# Patient Record
Sex: FEMALE | Race: WHITE | Employment: PART TIME | URBAN - METROPOLITAN AREA
[De-identification: names, ages, dates, MRNs, and addresses within clinical notes are randomized per-mention and may not be internally consistent; named-entity substitution may affect disease eponyms.]

---

## 2017-12-29 PROCEDURE — 88175 CYTOPATH C/V AUTO FLUID REDO: CPT | Performed by: OBSTETRICS & GYNECOLOGY

## 2018-02-28 PROCEDURE — 88305 TISSUE EXAM BY PATHOLOGIST: CPT | Performed by: INTERNAL MEDICINE

## 2018-10-03 PROBLEM — M25.519 ACUTE SHOULDER PAIN: Status: ACTIVE | Noted: 2018-10-03

## 2018-10-03 PROBLEM — R29.898 DECREASED STRENGTH OF UPPER EXTREMITY: Status: ACTIVE | Noted: 2018-10-03

## 2018-11-08 ENCOUNTER — HOSPITAL ENCOUNTER (OUTPATIENT)
Dept: MRI IMAGING | Age: 68
Discharge: HOME | End: 2018-11-08
Attending: NURSE PRACTITIONER
Payer: MEDICARE

## 2018-11-08 DIAGNOSIS — M25.511 RIGHT SHOULDER PAIN, UNSPECIFIED CHRONICITY: ICD-10-CM

## 2018-11-08 PROCEDURE — 73221 MRI JOINT UPR EXTREM W/O DYE: CPT | Performed by: NURSE PRACTITIONER

## 2019-01-02 PROBLEM — Z47.89 ORTHOPEDIC AFTERCARE: Status: ACTIVE | Noted: 2019-01-02

## 2019-05-29 RX ORDER — MULTIVIT,IRON,MINERALS/LUTEIN
1 TABLET ORAL DAILY
COMMUNITY
End: 2020-12-31

## 2019-05-29 RX ORDER — ACETAMINOPHEN 500 MG
1000 TABLET ORAL ONCE
Status: CANCELLED | OUTPATIENT
Start: 2019-05-29 | End: 2019-05-29

## 2019-06-06 ENCOUNTER — ANESTHESIA (OUTPATIENT)
Dept: SURGERY | Facility: HOSPITAL | Age: 69
End: 2019-06-06

## 2019-06-06 ENCOUNTER — HOSPITAL ENCOUNTER (OUTPATIENT)
Facility: HOSPITAL | Age: 69
Setting detail: HOSPITAL OUTPATIENT SURGERY
Discharge: HOME | End: 2019-06-06
Attending: ORTHOPAEDIC SURGERY | Admitting: ORTHOPAEDIC SURGERY
Payer: MEDICARE

## 2019-06-06 ENCOUNTER — ANESTHESIA EVENT (OUTPATIENT)
Dept: SURGERY | Facility: HOSPITAL | Age: 69
End: 2019-06-06

## 2019-06-06 VITALS
DIASTOLIC BLOOD PRESSURE: 75 MMHG | WEIGHT: 125.25 LBS | HEART RATE: 52 BPM | HEIGHT: 65 IN | TEMPERATURE: 98 F | SYSTOLIC BLOOD PRESSURE: 138 MMHG | BODY MASS INDEX: 20.87 KG/M2 | RESPIRATION RATE: 16 BRPM | OXYGEN SATURATION: 100 %

## 2019-06-06 DIAGNOSIS — M75.01 ADHESIVE CAPSULITIS OF RIGHT SHOULDER: ICD-10-CM

## 2019-06-06 PROCEDURE — 0RNJ4ZZ RELEASE RIGHT SHOULDER JOINT, PERCUTANEOUS ENDOSCOPIC APPROACH: ICD-10-PCS | Performed by: ORTHOPAEDIC SURGERY

## 2019-06-06 PROCEDURE — 76942 ECHO GUIDE FOR BIOPSY: CPT | Performed by: ORTHOPAEDIC SURGERY

## 2019-06-06 PROCEDURE — 3E0T3BZ INTRODUCTION OF ANESTHETIC AGENT INTO PERIPHERAL NERVES AND PLEXI, PERCUTANEOUS APPROACH: ICD-10-PCS | Performed by: ANESTHESIOLOGY

## 2019-06-06 RX ORDER — METOCLOPRAMIDE HYDROCHLORIDE 5 MG/ML
10 INJECTION INTRAMUSCULAR; INTRAVENOUS AS NEEDED
Status: DISCONTINUED | OUTPATIENT
Start: 2019-06-06 | End: 2019-06-06

## 2019-06-06 RX ORDER — HYDROCODONE BITARTRATE AND ACETAMINOPHEN 5; 325 MG/1; MG/1
2 TABLET ORAL AS NEEDED
Status: DISCONTINUED | OUTPATIENT
Start: 2019-06-06 | End: 2019-06-06

## 2019-06-06 RX ORDER — DIPHENHYDRAMINE HYDROCHLORIDE 50 MG/ML
12.5 INJECTION INTRAMUSCULAR; INTRAVENOUS AS NEEDED
Status: DISCONTINUED | OUTPATIENT
Start: 2019-06-06 | End: 2019-06-06

## 2019-06-06 RX ORDER — LABETALOL HYDROCHLORIDE 5 MG/ML
5 INJECTION, SOLUTION INTRAVENOUS EVERY 5 MIN PRN
Status: DISCONTINUED | OUTPATIENT
Start: 2019-06-06 | End: 2019-06-06

## 2019-06-06 RX ORDER — NAPROXEN 500 MG/1
500 TABLET ORAL 2 TIMES DAILY WITH MEALS
Qty: 28 TABLET | Refills: 0 | Status: SHIPPED | OUTPATIENT
Start: 2019-06-06 | End: 2019-06-20

## 2019-06-06 RX ORDER — NALOXONE HYDROCHLORIDE 0.4 MG/ML
80 INJECTION, SOLUTION INTRAMUSCULAR; INTRAVENOUS; SUBCUTANEOUS AS NEEDED
Status: DISCONTINUED | OUTPATIENT
Start: 2019-06-06 | End: 2019-06-06

## 2019-06-06 RX ORDER — SODIUM CHLORIDE, SODIUM LACTATE, POTASSIUM CHLORIDE, CALCIUM CHLORIDE 600; 310; 30; 20 MG/100ML; MG/100ML; MG/100ML; MG/100ML
INJECTION, SOLUTION INTRAVENOUS CONTINUOUS
Status: DISCONTINUED | OUTPATIENT
Start: 2019-06-06 | End: 2019-06-06

## 2019-06-06 RX ORDER — HYDROCODONE BITARTRATE AND ACETAMINOPHEN 5; 325 MG/1; MG/1
1 TABLET ORAL AS NEEDED
Status: DISCONTINUED | OUTPATIENT
Start: 2019-06-06 | End: 2019-06-06

## 2019-06-06 RX ORDER — ACETAMINOPHEN 500 MG
1000 TABLET ORAL EVERY 6 HOURS PRN
COMMUNITY
End: 2020-12-31 | Stop reason: ALTCHOICE

## 2019-06-06 RX ORDER — HYDROMORPHONE HYDROCHLORIDE 1 MG/ML
0.4 INJECTION, SOLUTION INTRAMUSCULAR; INTRAVENOUS; SUBCUTANEOUS EVERY 5 MIN PRN
Status: DISCONTINUED | OUTPATIENT
Start: 2019-06-06 | End: 2019-06-06

## 2019-06-06 RX ORDER — ONDANSETRON 2 MG/ML
4 INJECTION INTRAMUSCULAR; INTRAVENOUS AS NEEDED
Status: DISCONTINUED | OUTPATIENT
Start: 2019-06-06 | End: 2019-06-06

## 2019-06-06 RX ORDER — HYDROCODONE BITARTRATE AND ACETAMINOPHEN 10; 325 MG/1; MG/1
1 TABLET ORAL EVERY 4 HOURS PRN
Qty: 40 TABLET | Refills: 0 | Status: SHIPPED | OUTPATIENT
Start: 2019-06-06 | End: 2019-06-13 | Stop reason: ALTCHOICE

## 2019-06-06 NOTE — H&P
HPI (5/28/19):  Tommy Lima presents for follow-up for her right shoulder. Underwent a right shoulder coracohumeral and capsular injection on 4/26/2019.   This did help with some of her stiffness and she has made some improvement with range o damage to nerves and blood vessels, recurrent stiffness, persistent pain, need for further surgery, and problems with anesthesia. Overall she has fairly good rotator cuff strength, and I do not anticipate a recurrent rotator cuff tear.  We also discussed r

## 2019-06-06 NOTE — ANESTHESIA POSTPROCEDURE EVALUATION
Rajiv 64 Patient Status:  Hospital Outpatient Surgery   Age/Gender 81year old female MRN [id number]   Location [address] [place] [address] Attending Bernarda Gastelum MD   Hosp Day # 0 PCP MD Tonja Viramontes

## 2019-06-06 NOTE — BRIEF OP NOTE
Pre-Operative Diagnosis: Adhesive capsulitis of right shoulder [M75.01]     Post-Operative Diagnosis: Adhesive capsulitis of right shoulder [M75.01]      Procedure Performed:   Procedure(s):  RIGHT SHOULDER ARTHROSCOPY, CAPSULAR RELEASE, MANIPULATION UNDER

## 2019-06-06 NOTE — ANESTHESIA PREPROCEDURE EVALUATION
PRE-OP EVALUATION    Patient Name: Sharron Elizondo    Pre-op Diagnosis: Adhesive capsulitis of right shoulder [M75.01]    Procedure(s):  RIGHT SHOULDER ARTHROSCOPY, CAPSULAR RELEASE, MANIPULATION UNDER ANESTHESIA    Surgeon(s) and Role:     [place] Fung COLONOSCOPY,DIAGNOSTIC  2008    Dr. Frank Hand   • COLONOSCOPY,DIAGNOSTIC  02/28/2018    hepatic flexure polyp   • ESOPHAGOGASTRODUODENOSCOPY, COLONOSCOPY, POSSIBLE BIOPSY, POSSIBLE POLYPECTOMY 90419, 44042 N/A 2/28/2018    Performed by Lacho Cobb MD

## 2019-06-06 NOTE — OR NURSING
INSTRUCTIONS GIVEN AND QUESTIONS ADDRESSED. REQUESTING TO REST BEFORE GOING HOME. SCRIPTS TO BE DELIVERED. SLING IN PLACE. OPERATIVE ARM STILL NUMB.

## 2019-06-07 NOTE — OPERATIVE REPORT
[address]    PATIENT'S NAME: Sherryle Olp   ATTENDING PHYSICIAN: May Guy MD   OPERATING PHYSICIAN: May Guy MD   PATIENT ACCOUNT#:   [id number]    LOCATION:  [address] [place] [address] 1 ED 10  MEDICAL RECORD #:   [id number]       [facility] preoperative antibiotics.   An exam under anesthesia was performed in the supine position, and the range of motion for her nonoperative left arm shoulder was 180 degrees of forward flexion, 90 degrees of external rotation with the arm abducted, 80 degrees o probe in the posterior portal and completed our capsular release from the 6-o'clock position to the posterior portal itself, thus completing a full capsular release from anterior, inferior, and posterior.   We did obtain hemostasis in the joint and then rem

## 2022-01-31 PROBLEM — T85.44XA CAPSULAR CONTRACTURE OF BREAST IMPLANT: Status: ACTIVE | Noted: 2022-01-31

## 2022-01-31 PROBLEM — G57.62 PLANTAR NEUROMA OF LEFT FOOT: Status: ACTIVE | Noted: 2022-01-31

## 2022-01-31 PROBLEM — M25.519 ACUTE SHOULDER PAIN: Status: RESOLVED | Noted: 2018-10-03 | Resolved: 2022-01-31

## 2022-01-31 PROBLEM — E46 PROTEIN-CALORIE MALNUTRITION, UNSPECIFIED SEVERITY (HCC): Status: RESOLVED | Noted: 2022-01-31 | Resolved: 2022-01-31

## 2022-01-31 PROBLEM — E46 PROTEIN-CALORIE MALNUTRITION, UNSPECIFIED SEVERITY (HCC): Status: ACTIVE | Noted: 2022-01-31

## 2022-01-31 PROBLEM — G57.60 MORTON'S METATARSALGIA: Status: ACTIVE | Noted: 2022-01-31

## 2022-01-31 PROBLEM — Z47.89 ORTHOPEDIC AFTERCARE: Status: RESOLVED | Noted: 2019-01-02 | Resolved: 2022-01-31

## 2022-01-31 PROBLEM — H91.90 HEARING LOSS: Status: ACTIVE | Noted: 2022-01-31

## 2022-01-31 PROBLEM — R00.1 BRADYCARDIA: Status: ACTIVE | Noted: 2022-01-31

## 2023-01-04 ENCOUNTER — HOSPITAL ENCOUNTER (OUTPATIENT)
Age: 73
Discharge: HOME | End: 2023-01-04
Attending: EMERGENCY MEDICINE
Payer: MEDICARE

## 2023-01-04 VITALS
SYSTOLIC BLOOD PRESSURE: 142 MMHG | WEIGHT: 118 LBS | BODY MASS INDEX: 19.66 KG/M2 | HEART RATE: 66 BPM | RESPIRATION RATE: 18 BRPM | DIASTOLIC BLOOD PRESSURE: 72 MMHG | OXYGEN SATURATION: 98 % | TEMPERATURE: 100 F | HEIGHT: 65 IN

## 2023-01-04 DIAGNOSIS — U07.1 COVID-19: Primary | ICD-10-CM

## 2023-01-04 LAB
POCT INFLUENZA A: NEGATIVE
POCT INFLUENZA B: NEGATIVE
SARS-COV-2 RNA RESP QL NAA+PROBE: DETECTED

## 2023-01-04 PROCEDURE — 99204 OFFICE O/P NEW MOD 45 MIN: CPT

## 2023-01-04 PROCEDURE — 99203 OFFICE O/P NEW LOW 30 MIN: CPT

## 2023-01-04 PROCEDURE — 87502 INFLUENZA DNA AMP PROBE: CPT | Performed by: EMERGENCY MEDICINE

## 2023-01-04 NOTE — ED INITIAL ASSESSMENT (HPI)
Pt here w/ c/o fatigue, body aches, HA, fever, dry cough. Home covid negative. Some nasal congestion.

## 2023-01-04 NOTE — DISCHARGE INSTRUCTIONS
Return for new or worsening symptoms such as difficulty breathing. The first day of illness is day 0. You need to quarantine for 5 days after that. You need to wear a mask for the 5 days following that.

## 2023-03-05 ENCOUNTER — APPOINTMENT (OUTPATIENT)
Dept: GENERAL RADIOLOGY | Age: 73
End: 2023-03-05
Attending: PHYSICIAN ASSISTANT
Payer: MEDICARE

## 2023-03-05 ENCOUNTER — HOSPITAL ENCOUNTER (OUTPATIENT)
Age: 73
Discharge: HOME | End: 2023-03-05
Payer: MEDICARE

## 2023-03-05 VITALS
DIASTOLIC BLOOD PRESSURE: 69 MMHG | WEIGHT: 118 LBS | SYSTOLIC BLOOD PRESSURE: 160 MMHG | OXYGEN SATURATION: 100 % | HEIGHT: 65 IN | BODY MASS INDEX: 19.66 KG/M2 | RESPIRATION RATE: 18 BRPM | HEART RATE: 57 BPM | TEMPERATURE: 98 F

## 2023-03-05 DIAGNOSIS — J02.9 ACUTE VIRAL PHARYNGITIS: ICD-10-CM

## 2023-03-05 DIAGNOSIS — J20.9 ACUTE BRONCHITIS, UNSPECIFIED ORGANISM: Primary | ICD-10-CM

## 2023-03-05 LAB — S PYO AG THROAT QL IA.RAPID: NEGATIVE

## 2023-03-05 PROCEDURE — 99214 OFFICE O/P EST MOD 30 MIN: CPT

## 2023-03-05 PROCEDURE — 87651 STREP A DNA AMP PROBE: CPT | Performed by: PHYSICIAN ASSISTANT

## 2023-03-05 PROCEDURE — 71046 X-RAY EXAM CHEST 2 VIEWS: CPT | Performed by: PHYSICIAN ASSISTANT

## 2023-03-05 RX ORDER — PREDNISONE 20 MG/1
40 TABLET ORAL DAILY
Qty: 10 TABLET | Refills: 0 | Status: SHIPPED | OUTPATIENT
Start: 2023-03-05 | End: 2023-03-10

## 2023-03-05 RX ORDER — BENZONATATE 100 MG/1
100 CAPSULE ORAL 3 TIMES DAILY PRN
Qty: 30 CAPSULE | Refills: 0 | Status: SHIPPED | OUTPATIENT
Start: 2023-03-05 | End: 2023-04-04

## 2023-03-05 NOTE — ED INITIAL ASSESSMENT (HPI)
Congested cough, congestion, runny nose, sore throat x1 week. Tried taking sudafed without improvement. Denies known fevers.

## 2023-03-05 NOTE — DISCHARGE INSTRUCTIONS
Warm salt water gargles, tylenol and ibuprofen as needed, if worsening symptoms or difficulty swallowing be re-evaluated    Please follow up with your PCP if no improvement within 5-7 days. Go directly to the ER for any acute worsening of symptoms. Push oral fluids to help loosen up chest mucous and move it out of your body. Use a cold mist humidifier. Get enough rest and sleep. Take Guaifenesin (Robitussin), an expectorant to loosen mucous. To suppress a dry, hacking cough, you may take Dextromethorphan (Robitussin DM)  Seek immediate medical attention if symptoms worsen, if fever higher than 102, if no improvement in a few days. Symptoms usually last 10 days.

## 2023-09-24 ENCOUNTER — HOSPITAL ENCOUNTER (OUTPATIENT)
Age: 73
Discharge: HOME | End: 2023-09-24
Attending: EMERGENCY MEDICINE
Payer: MEDICARE

## 2023-09-24 VITALS
HEART RATE: 56 BPM | WEIGHT: 118 LBS | OXYGEN SATURATION: 98 % | BODY MASS INDEX: 20.14 KG/M2 | TEMPERATURE: 99 F | SYSTOLIC BLOOD PRESSURE: 135 MMHG | RESPIRATION RATE: 18 BRPM | HEIGHT: 64 IN | DIASTOLIC BLOOD PRESSURE: 69 MMHG

## 2023-09-24 DIAGNOSIS — J06.9 VIRAL UPPER RESPIRATORY TRACT INFECTION: Primary | ICD-10-CM

## 2023-09-24 LAB — SARS-COV-2 RNA RESP QL NAA+PROBE: NOT DETECTED

## 2023-09-24 PROCEDURE — 99212 OFFICE O/P EST SF 10 MIN: CPT

## 2023-09-24 PROCEDURE — 99213 OFFICE O/P EST LOW 20 MIN: CPT

## 2023-09-24 NOTE — ED INITIAL ASSESSMENT (HPI)
Pt here c/o cough for last 3 days. Denies congestion. Denies sore throat. Denies sob. Denies chest pain.

## 2023-09-24 NOTE — DISCHARGE INSTRUCTIONS
Tylenol or Advil as needed follow-up your doctor of next 2 days return if worse over-the-counter cough or cold medications.

## 2024-04-19 ENCOUNTER — APPOINTMENT (OUTPATIENT)
Dept: CT IMAGING | Facility: HOSPITAL | Age: 74
End: 2024-04-19
Attending: EMERGENCY MEDICINE
Payer: MEDICARE

## 2024-04-19 ENCOUNTER — HOSPITAL ENCOUNTER (INPATIENT)
Facility: HOSPITAL | Age: 74
LOS: 5 days | Discharge: HOME HEALTH | End: 2024-04-24
Attending: EMERGENCY MEDICINE | Admitting: INTERNAL MEDICINE
Payer: MEDICARE

## 2024-04-19 DIAGNOSIS — K35.211 ACUTE APPENDICITIS WITH PERFORATION, GENERALIZED PERITONITIS, AND ABSCESS, UNSPECIFIED WHETHER GANGRENE PRESENT: Primary | ICD-10-CM

## 2024-04-19 DIAGNOSIS — R19.7 DIARRHEA OF PRESUMED INFECTIOUS ORIGIN: ICD-10-CM

## 2024-04-19 DIAGNOSIS — E86.0 DEHYDRATION: ICD-10-CM

## 2024-04-19 DIAGNOSIS — E87.6 HYPOKALEMIA: ICD-10-CM

## 2024-04-19 DIAGNOSIS — E87.1 HYPONATREMIA: ICD-10-CM

## 2024-04-19 LAB
ALBUMIN SERPL-MCNC: 2.4 G/DL (ref 3.4–5)
ALBUMIN/GLOB SERPL: 0.6 {RATIO} (ref 1–2)
ALP LIVER SERPL-CCNC: 103 U/L
ALT SERPL-CCNC: 26 U/L
ANION GAP SERPL CALC-SCNC: 10 MMOL/L (ref 0–18)
ANION GAP SERPL CALC-SCNC: 8 MMOL/L (ref 0–18)
AST SERPL-CCNC: 27 U/L (ref 15–37)
ATRIAL RATE: 65 BPM
BASOPHILS # BLD AUTO: 0 X10(3) UL (ref 0–0.2)
BASOPHILS # BLD: 0 X10(3) UL (ref 0–0.2)
BASOPHILS NFR BLD AUTO: 0 %
BASOPHILS NFR BLD: 0 %
BILIRUB SERPL-MCNC: 0.5 MG/DL (ref 0.1–2)
BILIRUB UR QL STRIP.AUTO: NEGATIVE
BUN BLD-MCNC: 12 MG/DL (ref 9–23)
BUN BLD-MCNC: 13 MG/DL (ref 9–23)
CALCIUM BLD-MCNC: 8 MG/DL (ref 8.5–10.1)
CALCIUM BLD-MCNC: 8.5 MG/DL (ref 8.5–10.1)
CHLORIDE SERPL-SCNC: 89 MMOL/L (ref 98–112)
CHLORIDE SERPL-SCNC: 99 MMOL/L (ref 98–112)
CO2 SERPL-SCNC: 25 MMOL/L (ref 21–32)
CO2 SERPL-SCNC: 30 MMOL/L (ref 21–32)
COLOR UR AUTO: YELLOW
CREAT BLD-MCNC: 0.72 MG/DL
CREAT BLD-MCNC: 0.74 MG/DL
EGFRCR SERPLBLD CKD-EPI 2021: 86 ML/MIN/1.73M2 (ref 60–?)
EGFRCR SERPLBLD CKD-EPI 2021: 89 ML/MIN/1.73M2 (ref 60–?)
EOSINOPHIL # BLD AUTO: 0.01 X10(3) UL (ref 0–0.7)
EOSINOPHIL # BLD: 0 X10(3) UL (ref 0–0.7)
EOSINOPHIL NFR BLD AUTO: 0.1 %
EOSINOPHIL NFR BLD: 0 %
ERYTHROCYTE [DISTWIDTH] IN BLOOD BY AUTOMATED COUNT: 12.4 %
ERYTHROCYTE [DISTWIDTH] IN BLOOD BY AUTOMATED COUNT: 12.6 %
GLOBULIN PLAS-MCNC: 4.1 G/DL (ref 2.8–4.4)
GLUCOSE BLD-MCNC: 116 MG/DL (ref 70–99)
GLUCOSE BLD-MCNC: 92 MG/DL (ref 70–99)
GLUCOSE UR STRIP.AUTO-MCNC: NORMAL MG/DL
HCT VFR BLD AUTO: 37 %
HCT VFR BLD AUTO: 39.9 %
HGB BLD-MCNC: 13 G/DL
HGB BLD-MCNC: 13.9 G/DL
IMM GRANULOCYTES # BLD AUTO: 0.23 X10(3) UL (ref 0–1)
IMM GRANULOCYTES NFR BLD: 1.4 %
INR BLD: 1.22 (ref 0.8–1.2)
KETONES UR STRIP.AUTO-MCNC: 40 MG/DL
LEUKOCYTE ESTERASE UR QL STRIP.AUTO: 25
LIPASE SERPL-CCNC: 17 U/L (ref 13–75)
LYMPHOCYTES # BLD AUTO: 1.18 X10(3) UL (ref 1–4)
LYMPHOCYTES NFR BLD AUTO: 6.9 %
LYMPHOCYTES NFR BLD: 1.21 X10(3) UL (ref 1–4)
LYMPHOCYTES NFR BLD: 7 %
MCH RBC QN AUTO: 29.4 PG (ref 26–34)
MCH RBC QN AUTO: 29.7 PG (ref 26–34)
MCHC RBC AUTO-ENTMCNC: 34.8 G/DL (ref 31–37)
MCHC RBC AUTO-ENTMCNC: 35.1 G/DL (ref 31–37)
MCV RBC AUTO: 84.5 FL
MCV RBC AUTO: 84.7 FL
MONOCYTES # BLD AUTO: 1.43 X10(3) UL (ref 0.1–1)
MONOCYTES # BLD: 1.38 X10(3) UL (ref 0.1–1)
MONOCYTES NFR BLD AUTO: 8.4 %
MONOCYTES NFR BLD: 8 %
MORPHOLOGY: NORMAL
NEUTROPHILS # BLD AUTO: 14.16 X10 (3) UL (ref 1.5–7.7)
NEUTROPHILS # BLD AUTO: 14.16 X10(3) UL (ref 1.5–7.7)
NEUTROPHILS # BLD AUTO: 14.68 X10 (3) UL (ref 1.5–7.7)
NEUTROPHILS NFR BLD AUTO: 83.2 %
NEUTROPHILS NFR BLD: 71 %
NEUTS BAND NFR BLD: 14 %
NEUTS HYPERSEG # BLD: 14.71 X10(3) UL (ref 1.5–7.7)
NITRITE UR QL STRIP.AUTO: NEGATIVE
OSMOLALITY SERPL CALC.SUM OF ELEC: 269 MOSM/KG (ref 275–295)
OSMOLALITY SERPL CALC.SUM OF ELEC: 273 MOSM/KG (ref 275–295)
P AXIS: 67 DEGREES
P-R INTERVAL: 132 MS
PH UR STRIP.AUTO: 6 [PH] (ref 5–8)
PLATELET # BLD AUTO: 290 10(3)UL (ref 150–450)
PLATELET # BLD AUTO: 355 10(3)UL (ref 150–450)
PLATELET MORPHOLOGY: NORMAL
POTASSIUM SERPL-SCNC: 2.7 MMOL/L (ref 3.5–5.1)
POTASSIUM SERPL-SCNC: 3.1 MMOL/L (ref 3.5–5.1)
PROT SERPL-MCNC: 6.5 G/DL (ref 6.4–8.2)
PROT UR STRIP.AUTO-MCNC: 100 MG/DL
PROTHROMBIN TIME: 15.4 SECONDS (ref 11.6–14.8)
Q-T INTERVAL: 418 MS
QRS DURATION: 88 MS
QTC CALCULATION (BEZET): 434 MS
R AXIS: 64 DEGREES
RBC # BLD AUTO: 4.37 X10(6)UL
RBC # BLD AUTO: 4.72 X10(6)UL
RBC #/AREA URNS AUTO: >10 /HPF
SODIUM SERPL-SCNC: 129 MMOL/L (ref 136–145)
SODIUM SERPL-SCNC: 132 MMOL/L (ref 136–145)
SP GR UR STRIP.AUTO: 1.02 (ref 1–1.03)
T AXIS: 76 DEGREES
TOTAL CELLS COUNTED BLD: 100
TROPONIN I SERPL HS-MCNC: 4 NG/L
UROBILINOGEN UR STRIP.AUTO-MCNC: NORMAL MG/DL
VENTRICULAR RATE: 65 BPM
WBC # BLD AUTO: 17 X10(3) UL (ref 4–11)
WBC # BLD AUTO: 17.3 X10(3) UL (ref 4–11)

## 2024-04-19 PROCEDURE — 85007 BL SMEAR W/DIFF WBC COUNT: CPT | Performed by: EMERGENCY MEDICINE

## 2024-04-19 PROCEDURE — 85025 COMPLETE CBC W/AUTO DIFF WBC: CPT

## 2024-04-19 PROCEDURE — 93005 ELECTROCARDIOGRAM TRACING: CPT

## 2024-04-19 PROCEDURE — 80053 COMPREHEN METABOLIC PANEL: CPT | Performed by: EMERGENCY MEDICINE

## 2024-04-19 PROCEDURE — 99285 EMERGENCY DEPT VISIT HI MDM: CPT

## 2024-04-19 PROCEDURE — 74177 CT ABD & PELVIS W/CONTRAST: CPT | Performed by: EMERGENCY MEDICINE

## 2024-04-19 PROCEDURE — 85027 COMPLETE CBC AUTOMATED: CPT | Performed by: EMERGENCY MEDICINE

## 2024-04-19 PROCEDURE — 85027 COMPLETE CBC AUTOMATED: CPT

## 2024-04-19 PROCEDURE — 93010 ELECTROCARDIOGRAM REPORT: CPT

## 2024-04-19 PROCEDURE — 96368 THER/DIAG CONCURRENT INF: CPT

## 2024-04-19 PROCEDURE — 85610 PROTHROMBIN TIME: CPT | Performed by: SURGERY

## 2024-04-19 PROCEDURE — 96366 THER/PROPH/DIAG IV INF ADDON: CPT

## 2024-04-19 PROCEDURE — 80053 COMPREHEN METABOLIC PANEL: CPT

## 2024-04-19 PROCEDURE — 96375 TX/PRO/DX INJ NEW DRUG ADDON: CPT

## 2024-04-19 PROCEDURE — 83690 ASSAY OF LIPASE: CPT | Performed by: EMERGENCY MEDICINE

## 2024-04-19 PROCEDURE — 81001 URINALYSIS AUTO W/SCOPE: CPT | Performed by: EMERGENCY MEDICINE

## 2024-04-19 PROCEDURE — 85025 COMPLETE CBC W/AUTO DIFF WBC: CPT | Performed by: INTERNAL MEDICINE

## 2024-04-19 PROCEDURE — 84484 ASSAY OF TROPONIN QUANT: CPT | Performed by: EMERGENCY MEDICINE

## 2024-04-19 PROCEDURE — 83690 ASSAY OF LIPASE: CPT

## 2024-04-19 PROCEDURE — 85007 BL SMEAR W/DIFF WBC COUNT: CPT

## 2024-04-19 PROCEDURE — 96361 HYDRATE IV INFUSION ADD-ON: CPT

## 2024-04-19 PROCEDURE — 96365 THER/PROPH/DIAG IV INF INIT: CPT

## 2024-04-19 RX ORDER — KETOROLAC TROMETHAMINE 15 MG/ML
15 INJECTION, SOLUTION INTRAMUSCULAR; INTRAVENOUS ONCE
Status: COMPLETED | OUTPATIENT
Start: 2024-04-19 | End: 2024-04-19

## 2024-04-19 RX ORDER — POTASSIUM CHLORIDE 20 MEQ/1
20 TABLET, EXTENDED RELEASE ORAL ONCE
Status: COMPLETED | OUTPATIENT
Start: 2024-04-19 | End: 2024-04-19

## 2024-04-19 RX ORDER — ONDANSETRON 2 MG/ML
4 INJECTION INTRAMUSCULAR; INTRAVENOUS EVERY 6 HOURS PRN
Status: DISCONTINUED | OUTPATIENT
Start: 2024-04-19 | End: 2024-04-24

## 2024-04-19 RX ORDER — METOCLOPRAMIDE HYDROCHLORIDE 5 MG/ML
10 INJECTION INTRAMUSCULAR; INTRAVENOUS EVERY 8 HOURS PRN
Status: DISCONTINUED | OUTPATIENT
Start: 2024-04-19 | End: 2024-04-24

## 2024-04-19 RX ORDER — ONDANSETRON 2 MG/ML
4 INJECTION INTRAMUSCULAR; INTRAVENOUS EVERY 4 HOURS PRN
Status: CANCELLED | OUTPATIENT
Start: 2024-04-19 | End: 2024-04-19

## 2024-04-19 RX ORDER — ONDANSETRON 2 MG/ML
4 INJECTION INTRAMUSCULAR; INTRAVENOUS
Status: DISCONTINUED | OUTPATIENT
Start: 2024-04-19 | End: 2024-04-24

## 2024-04-19 RX ORDER — MORPHINE SULFATE 2 MG/ML
1 INJECTION, SOLUTION INTRAMUSCULAR; INTRAVENOUS EVERY 2 HOUR PRN
Status: DISCONTINUED | OUTPATIENT
Start: 2024-04-19 | End: 2024-04-24

## 2024-04-19 RX ORDER — MORPHINE SULFATE 4 MG/ML
4 INJECTION, SOLUTION INTRAMUSCULAR; INTRAVENOUS EVERY 2 HOUR PRN
Status: DISCONTINUED | OUTPATIENT
Start: 2024-04-19 | End: 2024-04-24

## 2024-04-19 RX ORDER — MELATONIN
3 NIGHTLY PRN
Status: DISCONTINUED | OUTPATIENT
Start: 2024-04-19 | End: 2024-04-24

## 2024-04-19 RX ORDER — ONDANSETRON 2 MG/ML
4 INJECTION INTRAMUSCULAR; INTRAVENOUS ONCE
Status: DISCONTINUED | OUTPATIENT
Start: 2024-04-19 | End: 2024-04-24

## 2024-04-19 RX ORDER — ENOXAPARIN SODIUM 100 MG/ML
40 INJECTION SUBCUTANEOUS DAILY
Status: DISCONTINUED | OUTPATIENT
Start: 2024-04-20 | End: 2024-04-24

## 2024-04-19 RX ORDER — BISACODYL 10 MG
10 SUPPOSITORY, RECTAL RECTAL
Status: DISCONTINUED | OUTPATIENT
Start: 2024-04-19 | End: 2024-04-24

## 2024-04-19 RX ORDER — ACETAMINOPHEN 500 MG
500 TABLET ORAL EVERY 4 HOURS PRN
Status: DISCONTINUED | OUTPATIENT
Start: 2024-04-19 | End: 2024-04-24

## 2024-04-19 RX ORDER — SODIUM CHLORIDE 9 MG/ML
INJECTION, SOLUTION INTRAVENOUS CONTINUOUS
Status: DISCONTINUED | OUTPATIENT
Start: 2024-04-19 | End: 2024-04-23

## 2024-04-19 RX ORDER — POTASSIUM CHLORIDE 14.9 MG/ML
20 INJECTION INTRAVENOUS ONCE
Status: COMPLETED | OUTPATIENT
Start: 2024-04-19 | End: 2024-04-19

## 2024-04-19 RX ORDER — POLYETHYLENE GLYCOL 3350 17 G/17G
17 POWDER, FOR SOLUTION ORAL DAILY PRN
Status: DISCONTINUED | OUTPATIENT
Start: 2024-04-19 | End: 2024-04-24

## 2024-04-19 RX ORDER — SODIUM CHLORIDE 9 MG/ML
INJECTION, SOLUTION INTRAVENOUS CONTINUOUS
Status: DISCONTINUED | OUTPATIENT
Start: 2024-04-19 | End: 2024-04-24

## 2024-04-19 RX ORDER — MORPHINE SULFATE 2 MG/ML
2 INJECTION, SOLUTION INTRAMUSCULAR; INTRAVENOUS ONCE
Status: COMPLETED | OUTPATIENT
Start: 2024-04-19 | End: 2024-04-19

## 2024-04-19 RX ORDER — MORPHINE SULFATE 2 MG/ML
2 INJECTION, SOLUTION INTRAMUSCULAR; INTRAVENOUS EVERY 2 HOUR PRN
Status: DISCONTINUED | OUTPATIENT
Start: 2024-04-19 | End: 2024-04-24

## 2024-04-19 RX ORDER — ENEMA 19; 7 G/133ML; G/133ML
1 ENEMA RECTAL ONCE AS NEEDED
Status: DISCONTINUED | OUTPATIENT
Start: 2024-04-19 | End: 2024-04-24

## 2024-04-19 RX ORDER — SENNOSIDES 8.6 MG
17.2 TABLET ORAL NIGHTLY PRN
Status: DISCONTINUED | OUTPATIENT
Start: 2024-04-19 | End: 2024-04-24

## 2024-04-19 NOTE — ED QUICK NOTES
Orders for admission, patient is aware of plan and ready to go upstairs. Any questions, please call ED RN Marcio at extension [phone].     Patient Covid vaccination status: Fully vaccinated     COVID Test Ordered in ED: None    COVID Suspicion at Admission: N/A    Running Infusions:    sodium chloride 125 mL/hr at 04/19/24 1501        Mental Status/LOC at time of transport: A&Ox4    Other pertinent information:   CIWA score: N/A   NIH score:  N/A

## 2024-04-19 NOTE — ED PROVIDER NOTES
Patient Seen in: [facility] Emergency Department      History     Chief Complaint   Patient presents with    Abdomen/Flank Pain     Stated Complaint: abd pain for 7 days    Subjective:   HPI    Patient reports symptoms starting about 7 days ago.  Patient reported initially some upper abdominal pain but now she has diffuse pain in all quadrants.  She was nauseous and had vomiting the first few days.  No coffee-ground or bloody emesis reported.  Patient went on to have diarrhea.  Daughter reports patient has had at least 12-15 episodes of diarrhea a day.  No black or bloody stool reported.  No fever.  No recent travel, unusual foods, or others ill with similar symptoms.  No antibiotics in the last month.  Patient gestures over her entire abdomen as location of the discomfort.  Patient was feeling short of breath today and generally weak and dehydrated.  No chest pain.    Objective:   Past Medical History:    COVID    Mitral valve prolapse    Strep throat    Visual impairment    reading glasses              Past Surgical History:   Procedure Laterality Date    Arthroscopy of joint unlisted Right 2018          Colonoscopy,diagnostic      Dr. Rayray Vernon,diagnostic  2018    hepatic flexure polyp    Egd N/A 2018    Procedure: ESOPHAGOGASTRODUODENOSCOPY, COLONOSCOPY, POSSIBLE BIOPSY, POSSIBLE POLYPECTOMY 67099, 89835;  Surgeon: Yan Quiñones MD;  Location: [facility]    Enlarge breast with implant Bilateral 2001    Implant left      Implant right      Other surgical history      abdominoplasty    Rev lower eyelid exten fat pad  10/14/08    Performed by LISA FERRARA at [facility]    Rev lower eyelid exten fat pad  10/14/08    Performed by LISA FERRARA at [facility]    Rev upper eyelid w excess skin  10/14/08    Performed by LISA FERRARA at [facility]    Rev upper eyelid w excess skin  10/14/08     Performed by LISA FERRARA at [facility], [facility]    Shoulder arthroscopy Right 06/2019    manipulation    Upper gi endoscopy,diagnosis  02/28/2018    hiatal hernia, gastric and SB Bx taken                Social History     Socioeconomic History    Marital status:    Tobacco Use    Smoking status: Never    Smokeless tobacco: Never   Vaping Use    Vaping status: Never Used   Substance and Sexual Activity    Alcohol use: Yes     Comment: socially    Drug use: No    Sexual activity: Not Currently     Partners: Male     Social Determinants of Health     Financial Resource Strain: Low Risk  (6/21/2021)    Received from [facility]    Overall Financial Resource Strain (CARDIA)     Difficulty of Paying Living Expenses: Not hard at all   Food Insecurity: No Food Insecurity (6/21/2021)    Received from [facility]    Hunger Vital Sign     Worried About Running Out of Food in the Last Year: Never true     Ran Out of Food in the Last Year: Never true   Transportation Needs: No Transportation Needs (6/21/2021)    Received from [facility]    PRAPARE - Transportation     Lack of Transportation (Medical): No     Lack of Transportation (Non-Medical): No   Stress: No Stress Concern Present (6/21/2021)    Received from [facility]    [employer] [place] [employer] - Occupational Stress Questionnaire     Feeling of Stress : Not at all              Review of Systems    Positive for stated complaint: abd pain for 7 days  Other systems are as noted in HPI.  Constitutional and vital signs reviewed.      All other systems reviewed and negative except as noted above.    Physical Exam     ED Triage Vitals [04/19/24 1321]   /75   Pulse 77   Resp 16   Temp 97.6 °F (36.4 °C)   Temp src Temporal   SpO2 97 %   O2 Device None (Room air)       Current:BP (!) 150/94   Pulse 70   Temp 97.6 °F (36.4 °C) (Temporal)   Resp 18   LMP  (LMP  Unknown)   SpO2 98%         Physical Exam  General: The patient is awake, alert, conversant.   Eyes: sclera white, conjunctiva pink and moist.  Lids and lashes are normal.  Oromucosa lips are dry  Neck: With no JVD  Lungs: Clear to auscultation bilaterally.  No rhonchi or rales.  Heart: Normal S1 and S2, without murmur.  Distal pulses are strong and symmetric.  Abdomen: Soft, nondistended.  Markedly tender diffusely across the mid lower abdomen with some involuntary guarding.  Extremities: Unremarkable.  Calves nonswollen, symmetric, nontender.  No pedal edema.  Skin: Not particularly pale  Neurologic:  Mental status as above.  Patient moves all extremities but seems mildly generally weak         ED Course     Labs Reviewed   COMP METABOLIC PANEL (14) - Abnormal; Notable for the following components:       Result Value    Glucose 116 (*)     Sodium 129 (*)     Potassium 2.7 (*)     Chloride 89 (*)     Calculated Osmolality 269 (*)     Albumin 2.4 (*)     A/G Ratio 0.6 (*)     All other components within normal limits   URINALYSIS WITH CULTURE REFLEX - Abnormal; Notable for the following components:    Clarity Urine Turbid (*)     Ketones Urine 40 (*)     Blood Urine 1+ (*)     Protein Urine 100 (*)     Leukocyte Esterase Urine 25 (*)     WBC Urine 6-10 (*)     RBC Urine >10 (*)     Bacteria Urine Rare (*)     Squamous Epi. Cells Few (*)     All other components within normal limits   MANUAL DIFFERENTIAL - Abnormal; Notable for the following components:    Neutrophil Absolute Manual 14.71 (*)     Monocyte Absolute Manual 1.38 (*)     All other components within normal limits   CBC W/ DIFFERENTIAL - Abnormal; Notable for the following components:    WBC 17.3 (*)     Neutrophil Absolute Prelim 14.68 (*)     All other components within normal limits   LIPASE - Normal   TROPONIN I HIGH SENSITIVITY - Normal   CBC WITH DIFFERENTIAL WITH PLATELET    Narrative:     The following orders were created for panel order CBC With  Differential With Platelet.  Procedure                               Abnormality         Status                     ---------                               -----------         ------                     CBC W/ DIFFERENTIAL[[id number]]          Abnormal            Final result                 Please view results for these tests on the individual orders.   SCAN SLIDE   URINE CULTURE, ROUTINE     EKG    Rate, intervals and axes as noted on EKG Report.  Rate: 65 bpm  Rhythm: Sinus Rhythm  Reading: Sinus rhythm with no extraordinary changes suggest ischemia or infarct                          MDM      Patient with 7 days of profuse diarrhea.  She appears significantly dehydrated and I suspect electrolyte abnormalities such as hyponatremia or hypokalemia.  Her abdomen is quite tender.  Colitis or diverticulitis include the differential.  This could be an infectious colitis.  Stool panel ordered   patient treated with IV fluid and offered nausea medicine.    CBC shows significantly elevated white count with a predominance of neutrophils on differential.  Hemoglobin and platelets are normal  Metabolic panel shows hypokalemia and hyponatremia treated with IV fluid normal saline and oral/IV potassium supplement respectively.  LFTs and lipase are normal.  Troponin negative  Urinalysis with high specific gravity and ketones.  Only 6-10 WBCs in a patient with no UTI symptoms    CT abdomen pelvis  I personally reviewed the actual radiographs themselves and my individual interpretation shows intra-abdominal abscess  radiologist's formal interpretation which I have reviewed  CONCLUSION:    1. There is wall thickening of the appendix with fluid.  There is irregularity of the distal appendix suggestive of perforation.   2. Large pelvic fluid collections measuring 10.7 x 4.9 x 10.9 cm and 7.9 x 5.2 x 6.7 cm suggestive of abscesses.   3. There is wall thickening of small bowel loops within the pelvis as well as the rectosigmoid portion  of the colon which may be secondary to is secondary inflammation.       I discussed case with Dr. Flowers, hospitalist.  She will admit  I discussed case with Dr. Monsivais, general surgery.  He will evaluate patient make further recommendations    I updated patient and her daughter on the workup and treatment plan  Patient will be maintained n.p.o.  She was started on Zosyn antibiotic.  Admission disposition: 4/19/2024  4:05 PM                                        Medical Decision Making      Disposition and Plan     Clinical Impression:  1. Acute appendicitis with perforation, generalized peritonitis, and abscess, unspecified whether gangrene present    2. Diarrhea of presumed infectious origin    3. Dehydration    4. Hypokalemia    5. Hyponatremia         Disposition:  Admit  4/19/2024  4:05 pm    Follow-up:  No follow-up provider specified.        Medications Prescribed:  Current Discharge Medication List                            Hospital Problems       Present on Admission  Date Reviewed: 9/24/2023            ICD-10-CM Noted POA    * (Principal) Diarrhea of presumed infectious origin R19.7 4/19/2024 Unknown    Acute appendicitis with perforation, generalized peritonitis, and abscess, unspecified whether gangrene present K35.211 4/19/2024 Unknown

## 2024-04-19 NOTE — CONSULTS
Merissa Oshea is a 73 year old female  Chief Complaint   Patient presents with    Abdomen/Flank Pain       HPI: 72 y/o female seen in er with abdominal pain  Daughter present  Pain began about 1 week ago  Initially had gotten better, now worse  Previous abdominal plasty          Past Medical History:    COVID    Mitral valve prolapse    Strep throat    Visual impairment    reading glasses     Past Surgical History:   Procedure Laterality Date    Arthroscopy of joint unlisted Right 2018          Colonoscopy,diagnostic      Dr. Seo    Colonoscopy,diagnostic  2018    hepatic flexure polyp    Egd N/A 2018    Procedure: ESOPHAGOGASTRODUODENOSCOPY, COLONOSCOPY, POSSIBLE BIOPSY, POSSIBLE POLYPECTOMY 03329, 83836;  Surgeon: Yan Quiñones MD;  Location: [facility], [facility]    Enlarge breast with implant Bilateral 2001    Implant left      Implant right      Other surgical history      abdominoplasty    Rev lower eyelid exten fat pad  10/14/08    Performed by LISA FERRARA at [facility]    Rev lower eyelid exten fat pad  10/14/08    Performed by LISA FERRARA at [facility]    Rev upper eyelid w excess skin  10/14/08    Performed by LISA FERRARA at [facility], [facility]    Rev upper eyelid w excess skin  10/14/08    Performed by LISA FERRARA at [facility], [facility]    Shoulder arthroscopy Right 2019    manipulation    Upper gi endoscopy,diagnosis  2018    hiatal hernia, gastric and SB Bx taken     Family History   Problem Relation Age of Onset    Cancer Mother         lung/brain/liver    Other (Other) Other         self-MVP    Heart Disorder Father     Hypertension Neg     Lipids Neg     Obesity Neg     Psychiatric Neg      Social History     Socioeconomic History    Marital status:    Tobacco Use    Smoking status: Never    Smokeless tobacco: Never   Vaping Use    Vaping status: Never Used   Substance and  Sexual Activity    Alcohol use: Yes     Comment: socially    Drug use: No    Sexual activity: Not Currently     Partners: Male     Social Determinants of Health     Financial Resource Strain: Low Risk  (6/21/2021)    Received from [facility]    Overall Financial Resource Strain (CARDIA)     Difficulty of Paying Living Expenses: Not hard at all   Food Insecurity: No Food Insecurity (6/21/2021)    Received from [facility]    Hunger Vital Sign     Worried About Running Out of Food in the Last Year: Never true     Ran Out of Food in the Last Year: Never true   Transportation Needs: No Transportation Needs (6/21/2021)    Received from [facility]    PRAPARE - Transportation     Lack of Transportation (Medical): No     Lack of Transportation (Non-Medical): No   Stress: No Stress Concern Present (6/21/2021)    Received from [facility]    [employer] [place] [employer] - Occupational Stress Questionnaire     Feeling of Stress : Not at all           EXAM: appears ill and dehydrated  Abd soft lower abdominal tenderness, not rigid      IMAGING: cat scan report and images reviewed with radiologist  2 large abscesses in pelvis  Amendable to drainage      IMPRESSION: perforated appendicitis with pelvic abscess x2  I suspect she perforated 1 week ago      PLAN: iv antibiotics  IR drainage in am  Details reviewed with patient and daughter

## 2024-04-19 NOTE — H&P
[facility]  History and Physical     Merissa Oshea Patient Status:  Emergency    1950 MRN [id number]   Location [facility] EMERGENCY DEPARTMENT Attending Santo Del Rio MD   Hosp Day # 0 PCP Cruz Puckett MD     Chief Complaint:   Abd pain, diarrhea    History of Present Illness: Merissa Oshea is a 73 year old female with no sig PMH presents today with c/o abd pain and diarrhea x 7 days. The pt states she has had low grade temps at home as well. Pain was at first waxing and waning but then a few days ago became constant. She is very active at baseline, and has been training to run a marathon in 1 m. In the ED, routine labs revealed a wbc 17.3, sodium 129, K 2.7, normal LFTs. CT a/p: showed perforated appendicitis with adjacent abscesses. IV zosyn was started in the ED. Surgery was consulted, pt was made NPO and admitted for further care and management.    Past Medical History:  Past Medical History:    COVID    Mitral valve prolapse    Strep throat    Visual impairment    reading glasses        Past Surgical History:   Past Surgical History:   Procedure Laterality Date    Arthroscopy of joint unlisted Right 2018          Colonoscopy,diagnostic      Dr. Seo    Colonoscopy,diagnostic  2018    hepatic flexure polyp    Egd N/A 2018    Procedure: ESOPHAGOGASTRODUODENOSCOPY, COLONOSCOPY, POSSIBLE BIOPSY, POSSIBLE POLYPECTOMY 92386, 91533;  Surgeon: Yan Quiñones MD;  Location: [facility]    Enlarge breast with implant Bilateral 2001    Implant left      Implant right      Other surgical history      abdominoplasty    Rev lower eyelid exten fat pad  10/14/08    Performed by LISA FERRARA at [facility]    Rev lower eyelid exten fat pad  10/14/08    Performed by LISA FERRARA at [facility]    Rev upper eyelid w excess skin  10/14/08    Performed by LISA FERRARA at [facility]    Rev  upper eyelid w excess skin  10/14/08    Performed by LISA FERRARA at [facility]    Shoulder arthroscopy Right 06/2019    manipulation    Upper gi endoscopy,diagnosis  02/28/2018    hiatal hernia, gastric and SB Bx taken       Social History:  reports that she has never smoked. She has never used smokeless tobacco. She reports current alcohol use. She reports that she does not use drugs.    Family History:   Family History   Problem Relation Age of Onset    Cancer Mother         lung/brain/liver    Other (Other) Other         self-MVP    Heart Disorder Father     Hypertension Neg     Lipids Neg     Obesity Neg     Psychiatric Neg         Allergies: No Known Allergies    Medications:    No current facility-administered medications on file prior to encounter.     Current Outpatient Medications on File Prior to Encounter   Medication Sig Dispense Refill    Multiple Vitamins-Minerals (PRESERVISION AREDS) Oral Tab Take by mouth.         Review of Systems:   A comprehensive 14 point review of systems was completed.    Pertinent positives and negatives noted in the HPI.    Physical Exam:    /79   Pulse 77   Temp 97.6 °F (36.4 °C) (Temporal)   Resp (!) 30   LMP  (LMP Unknown)   SpO2 100%   General: No acute distress. Alert and oriented x 3.  HEENT: Normocephalic atraumatic. Moist mucous membranes. EOM-I. PERRLA. Anicteric.  Neck: No lymphadenopathy. No JVD. No carotid bruits.  Respiratory: Clear to auscultation bilaterally. No wheezes. No rhonchi.  Cardiovascular: S1, S2. Regular rate and rhythm. No murmurs, rubs or gallops. Equal pulses.   Chest and Back: No tenderness or deformity.  Abdomen: ttp in rlq, nondistended.  Positive bowel sounds. No rebound, guarding or organomegaly.  Neurologic: No focal neurological deficits. CNII-XII grossly intact.  Musculoskeletal: Moves all extremities.  Extremities: No edema or cyanosis.  Integument: No rashes or lesions.   Psychiatric: Appropriate mood and  affect.      Diagnostic Data:      Labs:  Recent Labs   Lab 04/19/24  1350   WBC 17.3*   HGB 13.9   MCV 84.5   .0   BAND 14       Recent Labs   Lab 04/19/24  1350   *   BUN 13   CREATSERUM 0.74   CA 8.5   ALB 2.4*   *   K 2.7*   CL 89*   CO2 30.0   ALKPHO 103   AST 27   ALT 26   BILT 0.5   TP 6.5       CrCl cannot be calculated (Unknown ideal weight.).    No results for input(s): \"PTP\", \"INR\" in the last 168 hours.    COVID-19 Lab Results    COVID-19  Lab Results   Component Value Date    COVID19 Not Detected 09/24/2023    COVID19 Detected (A) 01/04/2023       Pro-Calcitonin  No results for input(s): \"PCT\" in the last 168 hours.    Cardiac  No results for input(s): \"TROP\", \"PBNP\" in the last 168 hours.    Creatinine Kinase  No results for input(s): \"CK\" in the last 168 hours.    Inflammatory Markers  No results for input(s): \"CRP\", \"LYNDA\", \"LDH\", \"DDIMER\" in the last 168 hours.    Recent Labs   Lab 04/19/24  1350   TROPHS 4       Imaging: Imaging data reviewed in Epic.      ASSESSMENT / PLAN:    Merissa Oshea is a 73 year old female with no sig PMH presents today with c/o abd pain and diarrhea x 7 days.     Sepsis 2/2 perforated appendicitis  Intra abdominal abscesses 2/2 above  -admit  -iv zosyn  -gen surgery consulted  -NPO  -monitor labs  -supportiive care: ivf, pain meds, antiemetics    Hyponatremia  Hypokalemia  -replace per protocol  -repeat labs in the morning      Quality:  DVT Prophylaxis: lovenox, scds  CODE status: full code  [place]ike: no  If COVID testing is negative, may discontinue isolation: yes     Plan of care discussed with patient and daughter, and all questions answered.        Shanice Flowers MD  [facility]  Pager [phone]  Answering Service number: [phone]

## 2024-04-19 NOTE — ED INITIAL ASSESSMENT (HPI)
Patient to the ER c/o abdominal pain for 7 days. Vomiting and diarrhea+ chest pain and sob. No fever

## 2024-04-20 ENCOUNTER — APPOINTMENT (OUTPATIENT)
Dept: CT IMAGING | Facility: HOSPITAL | Age: 74
End: 2024-04-20
Attending: SURGERY
Payer: MEDICARE

## 2024-04-20 LAB
ANION GAP SERPL CALC-SCNC: 8 MMOL/L (ref 0–18)
BASOPHILS # BLD AUTO: 0.06 X10(3) UL (ref 0–0.2)
BASOPHILS NFR BLD AUTO: 0.5 %
BUN BLD-MCNC: 10 MG/DL (ref 9–23)
CALCIUM BLD-MCNC: 7.8 MG/DL (ref 8.5–10.1)
CHLORIDE SERPL-SCNC: 103 MMOL/L (ref 98–112)
CO2 SERPL-SCNC: 25 MMOL/L (ref 21–32)
CREAT BLD-MCNC: 0.64 MG/DL
EGFRCR SERPLBLD CKD-EPI 2021: 94 ML/MIN/1.73M2 (ref 60–?)
EOSINOPHIL # BLD AUTO: 0.03 X10(3) UL (ref 0–0.7)
EOSINOPHIL NFR BLD AUTO: 0.2 %
ERYTHROCYTE [DISTWIDTH] IN BLOOD BY AUTOMATED COUNT: 12.8 %
GLUCOSE BLD-MCNC: 84 MG/DL (ref 70–99)
HCT VFR BLD AUTO: 34.4 %
HGB BLD-MCNC: 12.2 G/DL
IMM GRANULOCYTES # BLD AUTO: 0.19 X10(3) UL (ref 0–1)
IMM GRANULOCYTES NFR BLD: 1.5 %
INR BLD: 1.24 (ref 0.8–1.2)
LYMPHOCYTES # BLD AUTO: 0.74 X10(3) UL (ref 1–4)
LYMPHOCYTES NFR BLD AUTO: 5.7 %
MCH RBC QN AUTO: 29.8 PG (ref 26–34)
MCHC RBC AUTO-ENTMCNC: 35.5 G/DL (ref 31–37)
MCV RBC AUTO: 83.9 FL
MONOCYTES # BLD AUTO: 1.04 X10(3) UL (ref 0.1–1)
MONOCYTES NFR BLD AUTO: 8 %
NEUTROPHILS # BLD AUTO: 10.9 X10 (3) UL (ref 1.5–7.7)
NEUTROPHILS # BLD AUTO: 10.9 X10(3) UL (ref 1.5–7.7)
NEUTROPHILS NFR BLD AUTO: 84.1 %
OSMOLALITY SERPL CALC.SUM OF ELEC: 280 MOSM/KG (ref 275–295)
PLATELET # BLD AUTO: 281 10(3)UL (ref 150–450)
POTASSIUM SERPL-SCNC: 3.2 MMOL/L (ref 3.5–5.1)
PROTHROMBIN TIME: 15.6 SECONDS (ref 11.6–14.8)
RBC # BLD AUTO: 4.1 X10(6)UL
SODIUM SERPL-SCNC: 136 MMOL/L (ref 136–145)
WBC # BLD AUTO: 13 X10(3) UL (ref 4–11)

## 2024-04-20 PROCEDURE — 87077 CULTURE AEROBIC IDENTIFY: CPT | Performed by: SURGERY

## 2024-04-20 PROCEDURE — 87070 CULTURE OTHR SPECIMN AEROBIC: CPT | Performed by: SURGERY

## 2024-04-20 PROCEDURE — 87116 MYCOBACTERIA CULTURE: CPT | Performed by: SURGERY

## 2024-04-20 PROCEDURE — 99153 MOD SED SAME PHYS/QHP EA: CPT | Performed by: SURGERY

## 2024-04-20 PROCEDURE — 49406 IMAGE CATH FLUID PERI/RETRO: CPT | Performed by: SURGERY

## 2024-04-20 PROCEDURE — 87076 CULTURE ANAEROBE IDENT EACH: CPT | Performed by: SURGERY

## 2024-04-20 PROCEDURE — 0W9G30Z DRAINAGE OF PERITONEAL CAVITY WITH DRAINAGE DEVICE, PERCUTANEOUS APPROACH: ICD-10-PCS | Performed by: RADIOLOGY

## 2024-04-20 PROCEDURE — 87102 FUNGUS ISOLATION CULTURE: CPT | Performed by: SURGERY

## 2024-04-20 PROCEDURE — 87205 SMEAR GRAM STAIN: CPT | Performed by: SURGERY

## 2024-04-20 PROCEDURE — 99152 MOD SED SAME PHYS/QHP 5/>YRS: CPT | Performed by: SURGERY

## 2024-04-20 PROCEDURE — 85610 PROTHROMBIN TIME: CPT | Performed by: SURGERY

## 2024-04-20 PROCEDURE — 0D9P30Z DRAINAGE OF RECTUM WITH DRAINAGE DEVICE, PERCUTANEOUS APPROACH: ICD-10-PCS | Performed by: RADIOLOGY

## 2024-04-20 PROCEDURE — 87186 SC STD MICRODIL/AGAR DIL: CPT | Performed by: SURGERY

## 2024-04-20 PROCEDURE — 87206 SMEAR FLUORESCENT/ACID STAI: CPT | Performed by: SURGERY

## 2024-04-20 PROCEDURE — 85025 COMPLETE CBC W/AUTO DIFF WBC: CPT | Performed by: INTERNAL MEDICINE

## 2024-04-20 PROCEDURE — 87075 CULTR BACTERIA EXCEPT BLOOD: CPT | Performed by: SURGERY

## 2024-04-20 PROCEDURE — 80048 BASIC METABOLIC PNL TOTAL CA: CPT | Performed by: INTERNAL MEDICINE

## 2024-04-20 RX ORDER — MIDAZOLAM HYDROCHLORIDE 1 MG/ML
1 INJECTION INTRAMUSCULAR; INTRAVENOUS EVERY 5 MIN PRN
Status: DISCONTINUED | OUTPATIENT
Start: 2024-04-20 | End: 2024-04-20 | Stop reason: HOSPADM

## 2024-04-20 RX ORDER — NALOXONE HYDROCHLORIDE 0.4 MG/ML
INJECTION, SOLUTION INTRAMUSCULAR; INTRAVENOUS; SUBCUTANEOUS
Status: DISCONTINUED
Start: 2024-04-20 | End: 2024-04-20 | Stop reason: WASHOUT

## 2024-04-20 RX ORDER — FLUMAZENIL 0.1 MG/ML
INJECTION INTRAVENOUS
Status: DISCONTINUED
Start: 2024-04-20 | End: 2024-04-20 | Stop reason: WASHOUT

## 2024-04-20 RX ORDER — FLUMAZENIL 0.1 MG/ML
0.2 INJECTION INTRAVENOUS AS NEEDED
Status: DISCONTINUED | OUTPATIENT
Start: 2024-04-20 | End: 2024-04-20 | Stop reason: HOSPADM

## 2024-04-20 RX ORDER — NALOXONE HYDROCHLORIDE 0.4 MG/ML
80 INJECTION, SOLUTION INTRAMUSCULAR; INTRAVENOUS; SUBCUTANEOUS AS NEEDED
Status: DISCONTINUED | OUTPATIENT
Start: 2024-04-20 | End: 2024-04-20 | Stop reason: HOSPADM

## 2024-04-20 RX ORDER — SODIUM CHLORIDE 9 MG/ML
INJECTION, SOLUTION INTRAVENOUS CONTINUOUS
Status: DISCONTINUED | OUTPATIENT
Start: 2024-04-20 | End: 2024-04-20 | Stop reason: HOSPADM

## 2024-04-20 RX ORDER — MIDAZOLAM HYDROCHLORIDE 1 MG/ML
INJECTION INTRAMUSCULAR; INTRAVENOUS
Status: COMPLETED
Start: 2024-04-20 | End: 2024-04-20

## 2024-04-20 NOTE — IMAGING NOTE
1015- Pt arrives by bed with transport.  NPO since midnight on 4/20/24.  Denies blood thinners.  Labs drawn today and results reviewed.   Consent form signed by pt, time out completed prior to procedure.    1155- CT guided pelvic drain placement x2 complete, both drains sutured in place.  TYREL drains labeled #1(anterior) and #2 (posterior) corresponding with charting in Epic.  TYREL 1 drained 220 mL of purulent fluid in procedure room; TYREL 2 drained 70 mL of purulent fluid.  Pt awake and alert, vital signs stable.  Pt denies pain.  Drain site dressings CDI.  Report given to EMERSON Roque at bedside, pt transferred to room 303 accompanied by transport and radiology RN.  Drains assessed by RN x2 upon arrival to floor.  Per Dr. Del Castillo, pt may resume lovenox 6 hours after procedure completion.

## 2024-04-20 NOTE — PROGRESS NOTES
[facility]   part of [facility] Hospitalist Progress Note     Merissa Oshea Patient Status:  Inpatient    1950 MRN [id number]   Location [facility] 3NW-A Attending Shanice Flowers MD   Hosp Day # 1 PCP Cruz Puckett MD     Chief Complaint:   FU: Abd pain, diarrhea    Subjective:     Patient seen and examined.   , still painful  Tmax 99  No nausea or vomiting    Objective:    Review of Systems:   10 point ROS completed and was negative, except for pertinent positive and negatives stated in subjective.    Vital signs:  Temp:  [97.6 °F (36.4 °C)-99 °F (37.2 °C)] 98.8 °F (37.1 °C)  Pulse:  [65-77] 68  Resp:  [14-30] 18  BP: (101-150)/(51-94) 101/51  SpO2:  [97 %-100 %] 100 %    Physical Exam:    General: No acute distress.   HEENT:  EOMI, PERRLA, OP clear  Respiratory: Clear to auscultation bilaterally. No wheezes. No rhonchi.  Cardiovascular: S1, S2. Regular rate and rhythm. No murmurs.  Abdomen: Soft, nontender, nondistended.  Positive bowel sounds. No rebound or guarding.  Extremities: No edema.  Neuro:  Grossly non focal, no motor deficits.        Diagnostic Data:    Labs:  Recent Labs   Lab 24  1350 24  2120 24  0612   WBC 17.3* 17.0* 13.0*   HGB 13.9 13.0 12.2   MCV 84.5 84.7 83.9   .0 290.0 281.0   BAND 14  --   --    INR  --  1.22* 1.24*       Recent Labs   Lab 24  1350 24  2120 24  0612   * 92 84   BUN 13 12 10   CREATSERUM 0.74 0.72 0.64   CA 8.5 8.0* 7.8*   ALB 2.4*  --   --    * 132* 136   K 2.7* 3.1* 3.2*   CL 89* 99 103   CO2 30.0 25.0 25.0   ALKPHO 103  --   --    AST 27  --   --    ALT 26  --   --    BILT 0.5  --   --    TP 6.5  --   --        Estimated Creatinine Clearance: 68.1 mL/min (based on SCr of 0.64 mg/dL).    Recent Labs   Lab 24  0612   PTP 15.4* 15.6*   INR 1.22* 1.24*            COVID-19 Lab Results    COVID-19  Lab Results   Component Value Date     COVID19 Not Detected 09/24/2023    COVID19 Detected (A) 01/04/2023       Pro-Calcitonin  No results for input(s): \"PCT\" in the last 168 hours.    Cardiac  No results for input(s): \"TROP\", \"PBNP\" in the last 168 hours.    Creatinine Kinase  No results for input(s): \"CK\" in the last 168 hours.    Inflammatory Markers  No results for input(s): \"CRP\", \"LYNDA\", \"LDH\", \"DDIMER\" in the last 168 hours.    Imaging: Imaging data reviewed in Epic.    Medications:    ondansetron  4 mg Intravenous Once    enoxaparin  40 mg Subcutaneous Daily    piperacillin-tazobactam  3.375 g Intravenous Q8H       Assessment & Plan:     Merissa Oshea is a 73 year old female with no sig PMH presents today with c/o abd pain and diarrhea x 7 days.      Sepsis 2/2 perforated appendicitis  Intra abdominal abscesses 2/2 above  -admit  -iv zosyn  -gen surgery consulted >> dw dr arciniega  -NPO >> plan for IR drainage of abscesses today  -monitor labs >> WBC downtrending  -supportiive care: ivf, pain meds, antiemetics     Hyponatremia  Hypokalemia  -replace per protocol  -repeat labs show some improvement  -cont electrolyte protocol          Quality:  DVT Prophylaxis: lovenox, scds  CODE status: full code  Pike: no  If COVID testing is negative, may discontinue isolation: yes      Plan of care discussed with patient and daughter, and all questions answered.     DISPO:  Cont inpt  Planning for IR drainage today   Surgery following        Shanice Knapp Hospitalist  Pager [phone]  Answering Service number: [phone]            **Certification      PHYSICIAN Certification of Need for Inpatient Hospitalization - Initial Certification    Patient will require inpatient services that will reasonably be expected to span two midnight's based on the clinical documentation in H+P.   Based on patients current state of illness, I anticipate that, after discharge, patient will require TBD.

## 2024-04-20 NOTE — H&P
[facility]   part of Loc[facility]   History & Physical    Merissa Oshea Patient Status:  Inpatient    1950 MRN [id number]   Location [facility] 3NW-A Attending Shanice Flowers MD   Hosp Day # 1 PCP Cruz Puckett MD     Admitting Diagnosis:   Abscesses    History of Present Illness:   72 yo F abdominal abscess x 2    History   Past Medical History:  Past Medical History:    COVID    Mitral valve prolapse    Strep throat    Visual impairment    reading glasses       Past Surgical History:  Past Surgical History:   Procedure Laterality Date    Arthroscopy of joint unlisted Right 2018          Colonoscopy,diagnostic      Dr. Seo    Colonoscopy,diagnostic  2018    hepatic flexure polyp    Egd N/A 2018    Procedure: ESOPHAGOGASTRODUODENOSCOPY, COLONOSCOPY, POSSIBLE BIOPSY, POSSIBLE POLYPECTOMY 68300, 20319;  Surgeon: Yan Quiñones MD;  Location: [facility]    Enlarge breast with implant Bilateral 2001    Implant left      Implant right      Other surgical history      abdominoplasty    Rev lower eyelid exten fat pad  10/14/08    Performed by LISA FERRARA at [facility]    Rev lower eyelid exten fat pad  10/14/08    Performed by LISA FERRARA at [facility]    Rev upper eyelid w excess skin  10/14/08    Performed by LISA FERRARA at [facility]    Rev upper eyelid w excess skin  10/14/08    Performed by LISA FERRARA at [facility]    Shoulder arthroscopy Right 2019    manipulation    Upper gi endoscopy,diagnosis  2018    hiatal hernia, gastric and SB Bx taken       Social History:  Social History     Tobacco Use    Smoking status: Never    Smokeless tobacco: Never   Substance Use Topics    Alcohol use: Yes     Comment: socially        Family History:  Family History   Problem Relation Age of Onset    Cancer Mother         lung/brain/liver    Other (Other) Other          self-MVP    Heart Disorder Father     Hypertension Neg     Lipids Neg     Obesity Neg     Psychiatric Neg        Allergies/Medications:   Allergies:  No Known Allergies    Medications:  No current outpatient medications on file.    Physical Exam & Review of Systems:   Physical Exam:    /51 (BP Location: Right arm)   Pulse 68   Temp 98.8 °F (37.1 °C) (Oral)   Resp 18   Wt 118 lb   LMP  (LMP Unknown)   SpO2 100%   BMI 20.25 kg/m²     General: NAD  Neck: No JVD  Lungs: CTA bilat  Heart: RRR, S1, S2  Abdomen: Soft, NT/ND, BS+x4  Extremities: Warm, dry, no LE edema bilat  Pulses: 2+ bilat DP    Results:   Labs:  Recent Labs   Lab 04/19/24 1350 04/19/24 2120 04/20/24  0612   RBC 4.72 4.37 4.10   HGB 13.9 13.0 12.2   HCT 39.9 37.0 34.4*   MCV 84.5 84.7 83.9   MCH 29.4 29.7 29.8   MCHC 34.8 35.1 35.5   RDW 12.4 12.6 12.8   NEPRELIM 14.68* 14.16* 10.90*   WBC 17.3* 17.0* 13.0*   .0 290.0 281.0     Recent Labs   Lab 04/19/24 2120 04/20/24  0612   PTP 15.4* 15.6*   INR 1.22* 1.24*     Recent Labs   Lab 04/19/24  1350 04/19/24 2120 04/20/24  0612   * 92 84   BUN 13 12 10   CREATSERUM 0.74 0.72 0.64   CA 8.5 8.0* 7.8*   * 132* 136   K 2.7* 3.1* 3.2*   CL 89* 99 103   CO2 30.0 25.0 25.0       Assessment/Plan:   Impression: 74 yo F abdominal abscess x 2    I have discussed with the patient and/or legal representative the potential benefits, risks, and side effects of this procedure, the likelihood of the patient achieving goals; and the potential problems that might occur during recuperation.  I discussed reasonable alternatives to the procedure, including risks, benefits and side effects related to the alternatives, and risks related to not receiving this procedure.      Recommendations: CT guided drain placement x 2    Leo Del Castillo MD  4/20/2024  10:47 AM

## 2024-04-20 NOTE — PROGRESS NOTES
Merissa Oshea is a 73 year old female  Chief Complaint   Patient presents with    Abdomen/Flank Pain       HPI: patient admitted last night with pelvic abscess x 2 from perforated appendicitis    Looks much better after fluid resuscitation and iv antibiotics    IR to place drains this am          Past Medical History:    COVID    Mitral valve prolapse    Strep throat    Visual impairment    reading glasses     Past Surgical History:   Procedure Laterality Date    Arthroscopy of joint unlisted Right 2018          Colonoscopy,diagnostic      Dr. Seo    Colonoscopy,diagnostic  2018    hepatic flexure polyp    Egd N/A 2018    Procedure: ESOPHAGOGASTRODUODENOSCOPY, COLONOSCOPY, POSSIBLE BIOPSY, POSSIBLE POLYPECTOMY 06012, 39527;  Surgeon: Yan Quiñones MD;  Location: [facility], [facility]    Enlarge breast with implant Bilateral 2001    Implant left      Implant right      Other surgical history      abdominoplasty    Rev lower eyelid exten fat pad  10/14/08    Performed by LISA FERRARA at [facility]    Rev lower eyelid exten fat pad  10/14/08    Performed by LISA FERRARA at [facility]    Rev upper eyelid w excess skin  10/14/08    Performed by LISA FERRARA at [facility]    Rev upper eyelid w excess skin  10/14/08    Performed by LISA FERRARA at [facility]    Shoulder arthroscopy Right 2019    manipulation    Upper gi endoscopy,diagnosis  2018    hiatal hernia, gastric and SB Bx taken     Family History   Problem Relation Age of Onset    Cancer Mother         lung/brain/liver    Other (Other) Other         self-MVP    Heart Disorder Father     Hypertension Neg     Lipids Neg     Obesity Neg     Psychiatric Neg      Social History     Socioeconomic History    Marital status:    Tobacco Use    Smoking status: Never    Smokeless tobacco: Never   Vaping Use    Vaping status: Never Used    Substance and Sexual Activity    Alcohol use: Yes     Comment: socially    Drug use: No    Sexual activity: Not Currently     Partners: Male     Social Determinants of Health     Financial Resource Strain: Low Risk  (6/21/2021)    Received from [facility]    Overall Financial Resource Strain (CARDIA)     Difficulty of Paying Living Expenses: Not hard at all   Food Insecurity: No Food Insecurity (4/19/2024)    Food Insecurity     Food Insecurity: Never true   Transportation Needs: No Transportation Needs (4/19/2024)    Transportation Needs     Lack of Transportation: No   Stress: No Stress Concern Present (6/21/2021)    Received from [facility]    [employer] - Occupational Stress Questionnaire     Feeling of Stress : Not at all   Housing Stability: Low Risk  (4/19/2024)    Housing Stability     Housing Instability: No           EXAM: looks better  Abd soft mild tenderness lower abdomin          IMPRESSION: perforated appendicitis  probably over 1 week ago  Complicated y 2 very large pelvic abscessed      PLAN: iv antibiotics  IR drainage  Ok to start liquids after drainage

## 2024-04-20 NOTE — PROGRESS NOTES
Pt is alert and oriented x4. Lungs are clear bilaterally on room air. Bowel sounds are hypoactive. Abdomen is tender to touch and rounded. Denies passing flatus. Bowel sounds are hypoactive. Last bowel movement yesterday. Will collect next stool sample. IVF and abx infusing. Ambulatory with standby assist. Voiding without difficulty. Plan for IR drain placement at 1030.

## 2024-04-20 NOTE — PROCEDURES
[facility]   part of [facility]  Procedure Note    Merissa Oshea Patient Status:  Inpatient    1950 MRN [id number]   Location [facility] 3NW-A Attending Shanice Flowers MD   Hosp Day # 1 PCP Cruz Puckett MD     Procedure: CT guided drain placement x 2    Pre-Procedure Diagnosis:  Abscesses    Post-Procedure Diagnosis: Same    Anesthesia:  Local and Sedation    Findings:  10f apd placed to RLQ abscess and perirectal abscess using L transgluteal approach    Specimens: 20 ml pus from each site    Blood Loss:  Minimal    Tourniquet Time: None  Complications:  None  Drains:  As above    Secondary Diagnosis:  None    Leo Del Castillo MD  2024

## 2024-04-20 NOTE — PROGRESS NOTES
Pt is alert and oriented, vss on room air. Clear lung sounds upon assessment. Denies nausea. Pain is moderate to severe at time, being managed with Morphine prn. IVF infusing, IV Zosyn q8h as per scheduled, NPO, plan for IR drainage this morning. Pt updated, poc discussed, call light in reach

## 2024-04-21 LAB
ADENOVIRUS F 40/41 PCR: NEGATIVE
ANION GAP SERPL CALC-SCNC: 9 MMOL/L (ref 0–18)
ASTROVIRUS PCR: NEGATIVE
BASOPHILS # BLD AUTO: 0.03 X10(3) UL (ref 0–0.2)
BASOPHILS NFR BLD AUTO: 0.3 %
BUN BLD-MCNC: 5 MG/DL (ref 9–23)
C CAYETANENSIS DNA SPEC QL NAA+PROBE: NEGATIVE
C DIFF TOX B STL QL: NEGATIVE
CALCIUM BLD-MCNC: 7.4 MG/DL (ref 8.5–10.1)
CAMPY SP DNA.DIARRHEA STL QL NAA+PROBE: NEGATIVE
CHLORIDE SERPL-SCNC: 104 MMOL/L (ref 98–112)
CO2 SERPL-SCNC: 23 MMOL/L (ref 21–32)
CREAT BLD-MCNC: 0.54 MG/DL
CRYPTOSP DNA SPEC QL NAA+PROBE: NEGATIVE
EAEC PAA PLAS AGGR+AATA ST NAA+NON-PRB: NEGATIVE
EC STX1+STX2 + H7 FLIC SPEC NAA+PROBE: NEGATIVE
EGFRCR SERPLBLD CKD-EPI 2021: 98 ML/MIN/1.73M2 (ref 60–?)
ENTAMOEBA HISTOLYTICA PCR: NEGATIVE
EOSINOPHIL # BLD AUTO: 0.05 X10(3) UL (ref 0–0.7)
EOSINOPHIL NFR BLD AUTO: 0.6 %
EPEC EAE GENE STL QL NAA+NON-PROBE: NEGATIVE
ERYTHROCYTE [DISTWIDTH] IN BLOOD BY AUTOMATED COUNT: 12.9 %
ETEC LTA+ST1A+ST1B TOX ST NAA+NON-PROBE: NEGATIVE
GIARDIA LAMBLIA PCR: NEGATIVE
GLUCOSE BLD-MCNC: 93 MG/DL (ref 70–99)
HCT VFR BLD AUTO: 31 %
HGB BLD-MCNC: 10.4 G/DL
IMM GRANULOCYTES # BLD AUTO: 0.12 X10(3) UL (ref 0–1)
IMM GRANULOCYTES NFR BLD: 1.3 %
LYMPHOCYTES # BLD AUTO: 0.91 X10(3) UL (ref 1–4)
LYMPHOCYTES NFR BLD AUTO: 10.1 %
MCH RBC QN AUTO: 29.5 PG (ref 26–34)
MCHC RBC AUTO-ENTMCNC: 33.5 G/DL (ref 31–37)
MCV RBC AUTO: 88.1 FL
MONOCYTES # BLD AUTO: 0.64 X10(3) UL (ref 0.1–1)
MONOCYTES NFR BLD AUTO: 7.1 %
NEUTROPHILS # BLD AUTO: 7.25 X10 (3) UL (ref 1.5–7.7)
NEUTROPHILS # BLD AUTO: 7.25 X10(3) UL (ref 1.5–7.7)
NEUTROPHILS NFR BLD AUTO: 80.6 %
NOROVIRUS GI/GII PCR: NEGATIVE
OSMOLALITY SERPL CALC.SUM OF ELEC: 279 MOSM/KG (ref 275–295)
P SHIGELLOIDES DNA STL QL NAA+PROBE: NEGATIVE
PLATELET # BLD AUTO: 328 10(3)UL (ref 150–450)
POTASSIUM SERPL-SCNC: 3 MMOL/L (ref 3.5–5.1)
POTASSIUM SERPL-SCNC: 3 MMOL/L (ref 3.5–5.1)
RBC # BLD AUTO: 3.52 X10(6)UL
ROTAVIRUS A PCR: NEGATIVE
SALMONELLA DNA SPEC QL NAA+PROBE: NEGATIVE
SAPOVIRUS PCR: NEGATIVE
SHIGELLA SP+EIEC IPAH ST NAA+NON-PROBE: NEGATIVE
SODIUM SERPL-SCNC: 136 MMOL/L (ref 136–145)
V CHOLERAE DNA SPEC QL NAA+PROBE: NEGATIVE
VIBRIO DNA SPEC NAA+PROBE: NEGATIVE
WBC # BLD AUTO: 9 X10(3) UL (ref 4–11)
YERSINIA DNA SPEC NAA+PROBE: NEGATIVE

## 2024-04-21 PROCEDURE — 85025 COMPLETE CBC W/AUTO DIFF WBC: CPT | Performed by: INTERNAL MEDICINE

## 2024-04-21 PROCEDURE — 87493 C DIFF AMPLIFIED PROBE: CPT | Performed by: INTERNAL MEDICINE

## 2024-04-21 PROCEDURE — 84132 ASSAY OF SERUM POTASSIUM: CPT | Performed by: INTERNAL MEDICINE

## 2024-04-21 PROCEDURE — 87507 IADNA-DNA/RNA PROBE TQ 12-25: CPT | Performed by: EMERGENCY MEDICINE

## 2024-04-21 PROCEDURE — 80048 BASIC METABOLIC PNL TOTAL CA: CPT | Performed by: INTERNAL MEDICINE

## 2024-04-21 RX ORDER — POTASSIUM CHLORIDE 14.9 MG/ML
20 INJECTION INTRAVENOUS ONCE
Status: COMPLETED | OUTPATIENT
Start: 2024-04-21 | End: 2024-04-21

## 2024-04-21 NOTE — PROGRESS NOTES
Merissa Oshea is a 73 year old female  Chief Complaint   Patient presents with    Abdomen/Flank Pain       HPI: s/p IR drainage of 2 pelvic abscesses  She feels better less pain less distention  Wbc  now normal          Past Medical History:    COVID    Mitral valve prolapse    Strep throat    Visual impairment    reading glasses     Past Surgical History:   Procedure Laterality Date    Arthroscopy of joint unlisted Right 2018          Colonoscopy,diagnostic      Dr. Seo    Colonoscopy,diagnostic  2018    hepatic flexure polyp    Egd N/A 2018    Procedure: ESOPHAGOGASTRODUODENOSCOPY, COLONOSCOPY, POSSIBLE BIOPSY, POSSIBLE POLYPECTOMY 75410, 14806;  Surgeon: Yan Quiñones MD;  Location: [facility], [facility]    Enlarge breast with implant Bilateral 2001    Implant left      Implant right      Other surgical history      abdominoplasty    Rev lower eyelid exten fat pad  10/14/08    Performed by LISA FERRARA at [facility]    Rev lower eyelid exten fat pad  10/14/08    Performed by LISA FERRARA at [facility]    Rev upper eyelid w excess skin  10/14/08    Performed by LISA FERRARA at [facility]    Rev upper eyelid w excess skin  10/14/08    Performed by LISA FERRARA at [facility]    Shoulder arthroscopy Right 2019    manipulation    Upper gi endoscopy,diagnosis  2018    hiatal hernia, gastric and SB Bx taken     Family History   Problem Relation Age of Onset    Cancer Mother         lung/brain/liver    Other (Other) Other         self-MVP    Heart Disorder Father     Hypertension Neg     Lipids Neg     Obesity Neg     Psychiatric Neg      Social History     Socioeconomic History    Marital status:    Tobacco Use    Smoking status: Never    Smokeless tobacco: Never   Vaping Use    Vaping status: Never Used   Substance and Sexual Activity    Alcohol use: Yes     Comment: socially     Drug use: No    Sexual activity: Not Currently     Partners: Male     Social Determinants of Health     Financial Resource Strain: Low Risk  (6/21/2021)    Received from [facility]    Overall Financial Resource Strain (CARDIA)     Difficulty of Paying Living Expenses: Not hard at all   Food Insecurity: No Food Insecurity (4/19/2024)    Food Insecurity     Food Insecurity: Never true   Transportation Needs: No Transportation Needs (4/19/2024)    Transportation Needs     Lack of Transportation: No   Stress: No Stress Concern Present (6/21/2021)    Received from [facility]    [employer] - Occupational Stress Questionnaire     Feeling of Stress : Not at all   Housing Stability: Low Risk  (4/19/2024)    Housing Stability     Housing Instability: No           EXAM: abd soft mild lower abd tenderness, no peritonitis  Drains with pus        IMPRESSION: perforated appendicitis with pelvic abscess  S/p successful IR drainage      PLAN: iv antibiotics  Clear liquid diet with supplements  Follow cultures

## 2024-04-21 NOTE — PROGRESS NOTES
Pt is alert and oriented,on room air without signs of sob,vss  LS are clear upon assessment  Mild abdominal pain, tolerable w/o pain medication.  Pt denies nausea, tolerating clear liquid diet.  Active bowel sounds,abdomen soft, passing gas.  2 IR drains to suction, output is milky tan and purulent. Flush and aspirated at 1945.   IVF infusing, IV Zosyn as scheduled, pt voids adequately, up with stand by assistance, Poc discussed,safety precautions in place,call light in reach

## 2024-04-21 NOTE — PROGRESS NOTES
Pt is A/Ox4, on RA, tolerating a clear liquid diet well.  IVF running per order, SCDs are on.  x2 IR drains, both flushed and aspirated.  Pt updated on plan of care, all belongings in reach, all questions and concerns addressed at this time.

## 2024-04-21 NOTE — PROGRESS NOTES
[facility]   part of [facility] Hospitalist Progress Note     Merissa Oshea Patient Status:  Inpatient    1950 MRN [id number]   Location [facility] 3NW-A Attending Shanice Flowers MD   Hosp Day # 2 PCP Cruz Puckett MD     Chief Complaint:   FU: Abd pain, diarrhea    Subjective:     Patient seen and examined.   , still painful  afebrile  No nausea or vomiting  2 drains in place - bulb with cloudy milky fluid    Objective:    Review of Systems:   10 point ROS completed and was negative, except for pertinent positive and negatives stated in subjective.    Vital signs:  Temp:  [97.7 °F (36.5 °C)-98.8 °F (37.1 °C)] 98.3 °F (36.8 °C)  Pulse:  [58-82] 58  Resp:  [14-23] 16  BP: ()/(40-73) 114/55  SpO2:  [95 %-100 %] 99 %    Physical Exam:    General: No acute distress.   HEENT:  EOMI, PERRLA, OP clear  Respiratory: Clear to auscultation bilaterally. No wheezes. No rhonchi.  Cardiovascular: S1, S2. Regular rate and rhythm. No murmurs.  Abdomen: Soft, nontender, nondistended.  Positive bowel sounds. No rebound or guarding.  Extremities: No edema.  Neuro:  Grossly non focal, no motor deficits.        Diagnostic Data:    Labs:  Recent Labs   Lab 24  1350 24  0624  0603   WBC 17.3* 17.0* 13.0* 9.0   HGB 13.9 13.0 12.2 10.4*   MCV 84.5 84.7 83.9 88.1   .0 290.0 281.0 328.0   BAND 14  --   --   --    INR  --  1.22* 1.24*  --        Recent Labs   Lab 24  1350 24  0612 24  0603   * 92 84 93   BUN 13 12 10 5*   CREATSERUM 0.74 0.72 0.64 0.54*   CA 8.5 8.0* 7.8* 7.4*   ALB 2.4*  --   --   --    * 132* 136 136   K 2.7* 3.1* 3.2* 3.0*  3.0*   CL 89* 99 103 104   CO2 30.0 25.0 25.0 23.0   ALKPHO 103  --   --   --    AST 27  --   --   --    ALT 26  --   --   --    BILT 0.5  --   --   --    TP 6.5  --   --   --        Estimated Creatinine Clearance: 80.7 mL/min (A) (based on SCr  of 0.54 mg/dL (L)).    Recent Labs   Lab 04/19/24 2120 04/20/24  0612   PTP 15.4* 15.6*   INR 1.22* 1.24*            COVID-19 Lab Results    COVID-19  Lab Results   Component Value Date    COVID19 Not Detected 09/24/2023    COVID19 Detected (A) 01/04/2023       Pro-Calcitonin  No results for input(s): \"PCT\" in the last 168 hours.    Cardiac  No results for input(s): \"TROP\", \"PBNP\" in the last 168 hours.    Creatinine Kinase  No results for input(s): \"CK\" in the last 168 hours.    Inflammatory Markers  No results for input(s): \"CRP\", \"LYNDA\", \"LDH\", \"DDIMER\" in the last 168 hours.    Imaging: Imaging data reviewed in Epic.    Medications:    potassium chloride  40 mEq Intravenous Once    Followed by    potassium chloride  20 mEq Intravenous Once    sodium chloride  500 mL Intravenous Once    ondansetron  4 mg Intravenous Once    enoxaparin  40 mg Subcutaneous Daily    piperacillin-tazobactam  3.375 g Intravenous Q8H       Assessment & Plan:     Merissa Oshea is a 73 year old female with no sig PMH presents today with c/o abd pain and diarrhea x 7 days.      Sepsis 2/2 perforated appendicitis  Intra abdominal abscesses 2/2 above s/p IR drain placement 4/20/24  -admit  -iv zosyn  -drains placed via IR  -surgery following >> apprec rec  -monitor labs >> WBC downtrending  -supportiive care: ivf, pain meds, antiemetics  -resume diet      Hyponatremia  Hypokalemia  -replace per protocol  -repeat labs show some improvement  -cont electrolyte protocol          Quality:  DVT Prophylaxis: lovenox, scds  CODE status: full code  Pike: no  If COVID testing is negative, may discontinue isolation: yes      Plan of care discussed with patient and daughter, and all questions answered.     DISPO:  Cont inpt  Sp IR drain placement  Surgery following        Shanice Knapp Hospitalist  Pager [phone]  Answering Service number: [phone]            **Certification      PHYSICIAN Certification of Need for Inpatient  Hospitalization - Initial Certification    Patient will require inpatient services that will reasonably be expected to span two midnight's based on the clinical documentation in H+P.   Based on patients current state of illness, I anticipate that, after discharge, patient will require TBD.

## 2024-04-21 NOTE — IMAGING NOTE
74 yo F pelvic abscesses x 2 s/p drain placement.  White count improved.  Cultures pos for Strep sp.  ~50 ml out of each tube.  Continue drains until clinically improved and consistently <10-15 ml output daily.  This can be done as outpatient.

## 2024-04-22 LAB
POTASSIUM SERPL-SCNC: 3.1 MMOL/L (ref 3.5–5.1)
POTASSIUM SERPL-SCNC: 3.4 MMOL/L (ref 3.5–5.1)

## 2024-04-22 PROCEDURE — 02HV33Z INSERTION OF INFUSION DEVICE INTO SUPERIOR VENA CAVA, PERCUTANEOUS APPROACH: ICD-10-PCS | Performed by: HOSPITALIST

## 2024-04-22 PROCEDURE — 84132 ASSAY OF SERUM POTASSIUM: CPT | Performed by: INTERNAL MEDICINE

## 2024-04-22 PROCEDURE — 84132 ASSAY OF SERUM POTASSIUM: CPT | Performed by: SURGERY

## 2024-04-22 PROCEDURE — 36569 INSJ PICC 5 YR+ W/O IMAGING: CPT

## 2024-04-22 RX ORDER — LIDOCAINE HYDROCHLORIDE 10 MG/ML
5 INJECTION, SOLUTION EPIDURAL; INFILTRATION; INTRACAUDAL; PERINEURAL
Status: COMPLETED | OUTPATIENT
Start: 2024-04-22 | End: 2024-04-22

## 2024-04-22 RX ORDER — POTASSIUM CHLORIDE 20 MEQ/1
40 TABLET, EXTENDED RELEASE ORAL EVERY 4 HOURS
Status: COMPLETED | OUTPATIENT
Start: 2024-04-22 | End: 2024-04-22

## 2024-04-22 NOTE — CONSULTS
[facility]   part of Located within [facility] Infectious Disease Consult    Merissa Oshea Patient Status:  Inpatient    1950 MRN [id number]   Location [facility] 3NW-A Attending Shanice Flowers MD   Hosp Day # 3 PCP Cruz Puckett MD     Reason for Consultation:  To help with infection and treatment, requested by Dr. Flowesr,     History of Present Illness:  Merissa Oshea is a 73 year old female admitted to [facility] on  with abdominal Pain and diarrhea,   Significant hx of   - Mitral Valve Prolapse,   Developed low grade temps with abdominal Pain and diarrhea for a week PTA,  Pain was off and on initially and later became more persistent and constant, so she decided to come to ER, where she she was found to have Leukocytosis. CT scan showed Perforated Appendicitis with adjacent abscess.  She was started on IV Zosyn and surgery was consulted.  ID is asked to help with infection and treatment.      History:  Past Medical History:    COVID    Mitral valve prolapse    Strep throat    Visual impairment    reading glasses     Past Surgical History:   Procedure Laterality Date    Arthroscopy of joint unlisted Right 2018          Colonoscopy,diagnostic      Dr. Seo    Colonoscopy,diagnostic  2018    hepatic flexure polyp    Egd N/A 2018    Procedure: ESOPHAGOGASTRODUODENOSCOPY, COLONOSCOPY, POSSIBLE BIOPSY, POSSIBLE POLYPECTOMY 50883, 39490;  Surgeon: Yan Quiñones MD;  Location: [facility]    Enlarge breast with implant Bilateral 2001    Implant left      Implant right      Other surgical history      abdominoplasty    Rev lower eyelid exten fat pad  10/14/08    Performed by LISA FERRARA at [facility]    Rev lower eyelid exten fat pad  10/14/08    Performed by LISA FERRARA at [facility]    Rev upper eyelid w excess skin  10/14/08    Performed by LISA FERRARA at [facility]    Rev  upper eyelid w excess skin  10/14/08    Performed by LISA FERRARA at [facility], [facility]    Shoulder arthroscopy Right 06/2019    manipulation    Upper gi endoscopy,diagnosis  02/28/2018    hiatal hernia, gastric and SB Bx taken     Family History   Problem Relation Age of Onset    Cancer Mother         lung/brain/liver    Other (Other) Other         self-MVP    Heart Disorder Father     Hypertension Neg     Lipids Neg     Obesity Neg     Psychiatric Neg       reports that she has never smoked. She has never used smokeless tobacco. She reports current alcohol use. She reports that she does not use drugs.    Allergies:  No Known Allergies    Medications:    Current Facility-Administered Medications:     potassium chloride (K-Dur) tab 40 mEq, 40 mEq, Oral, Q4H    sodium chloride 0.9 % IV bolus 500 mL, 500 mL, Intravenous, Once    sodium chloride 0.9% infusion, , Intravenous, Continuous    ondansetron (Zofran) 4 MG/2ML injection 4 mg, 4 mg, Intravenous, Once    ondansetron (Zofran) 4 MG/2ML injection 4 mg, 4 mg, Intravenous, Q1H PRN    sodium chloride 0.9% infusion, , Intravenous, Continuous    enoxaparin (Lovenox) 40 MG/0.4ML SUBQ injection 40 mg, 40 mg, Subcutaneous, Daily    acetaminophen (Tylenol Extra Strength) tab 500 mg, 500 mg, Oral, Q4H PRN    morphINE PF 2 MG/ML injection 1 mg, 1 mg, Intravenous, Q2H PRN **OR** morphINE PF 2 MG/ML injection 2 mg, 2 mg, Intravenous, Q2H PRN **OR** morphINE PF 4 MG/ML injection 4 mg, 4 mg, Intravenous, Q2H PRN    melatonin tab 3 mg, 3 mg, Oral, Nightly PRN    polyethylene glycol (PEG 3350) (Miralax) 17 g oral packet 17 g, 17 g, Oral, Daily PRN    sennosides (Senokot) tab 17.2 mg, 17.2 mg, Oral, Nightly PRN    bisacodyl (Dulcolax) 10 MG rectal suppository 10 mg, 10 mg, Rectal, Daily PRN    fleet enema (Fleet) 7-19 GM/118ML rectal enema 133 mL, 1 enema, Rectal, Once PRN    ondansetron (Zofran) 4 MG/2ML injection 4 mg, 4 mg, Intravenous, Q6H PRN    metoclopramide (Reglan) 5  mg/mL injection 10 mg, 10 mg, Intravenous, Q8H PRN    sodium chloride 0.9% infusion, , Intravenous, Continuous    piperacillin-tazobactam (Zosyn) 3.375 g in dextrose 5% 100 mL IVPB-ADDV, 3.375 g, Intravenous, Q8H    Review of Systems:   Constitutional: Negative for anorexia, chills, fatigue, fevers, malaise, night sweats and weight loss.  Eyes: Negative for visual disturbance, irritation and redness.  Ears, nose, mouth, throat, and face: Negative for hearing loss, tinnitus, nasal congestion, snoring, sore throat, hoarseness and voice change.  Respiratory: Negative for cough, sputum, hemoptysis, chest pain, wheezing, dyspnea on exertion, or stridor.  Cardiovascular: Negative for chest pain, palpitations, irregular heart beats, syncope, fatigue, orthopnea, paroxysmal nocturnal dyspnea, lower extremity edema.  Gastrointestinal: Negative for dysphagia, odynophagia, reflux symptoms, nausea, vomiting, change in bowel habits, diarrhea, constipation and abdominal pain.  Integument/breast: Negative for rash, skin lesions, and pruritus.  Hematologic/lymphatic: Negative for easy bruising, bleeding, and lymphadenopathy.  Musculoskeletal: Negative for myalgias, arthralgias, muscle weakness.  Neurological: Negative for headaches, dizziness, seizures, memory problems, trouble swallowing, speech problems, gait problems and weakness.  Behavioral/Psych: Negative for active tobacco use.  Endocrine: No history of of diabetes, thyroid disorder.  All other review of systems are negative.    Vital signs in last 24 hours:  Patient Vitals for the past 24 hrs:   BP Temp Temp src Pulse Resp SpO2   04/22/24 0428 128/63 98.1 °F (36.7 °C) Oral 55 18 100 %   04/21/24 1930 140/56 98.1 °F (36.7 °C) Oral (!) 47 18 100 %   04/21/24 1225 117/68 98 °F (36.7 °C) Oral 61 18 100 %       Physical Exam:   General: alert, cooperative, oriented.  No respiratory distress.   Head: Normocephalic, without obvious abnormality, atraumatic.   Eyes:  Conjunctivae/corneas clear.  No scleral icterus.  No conjunctival     hemorrhage.   Nose: Nares normal.   Throat: Lips, mucosa, and tongue normal.  No thrush noted.   Neck: Soft, supple neck; trachea midline, no adenopathy, no thyromegaly.   Lungs: CTAB, normal and equal bilateral chest rise   Chest wall: No tenderness or deformity.   Heart: Regular rate and rhythm, normal S1S2, no murmur.   Abdomen: soft, tender, non-distended, drains in place, positive BS.   Extremity: no edema, no cyanosis   Skin: No rashes or lesions.   Neurological: Alert, interactive, no focal deficits    Labs:  Lab Results   Component Value Date    K 3.1 04/22/2024       Radiology:  CT- 1. There is wall thickening of the appendix with fluid.  There is irregularity of the distal appendix suggestive of perforation.   2. Large pelvic fluid collections measuring 10.7 x 4.9 x 10.9 cm and 7.9 x 5.2 x 6.7 cm suggestive of abscesses.   3. There is wall thickening of small bowel loops within the pelvis as well as the rectosigmoid portion of the colon which may be secondary to is secondary inflammation.   4. This critical value result was called to Dr. Del Rio on 4/19/2024 at 3:47 p.m..  Results read back was performed       Cultures:  Reviewed,     Assessment and Plan:    1.   Acute Perforated Appendicitis: admitted with abdominal pain, diarrhea of a wk,   - CT with perforated appendix, Large pelvic fluid ~10.7 x 4.9 x 10.9 cm and 7.9 x 5.2 x 6.7 cm suggestive of abscesses.   - No blood cul found,   - Noted surgery input,   - Seen by IR, Drains in place,   - Drainage GS polymicrobial, Cul with E coli, Strep Anginosis,   - On IV Zosyn, pharm to dose,   - Will need PICC line and IV abx as OP,     2.     Leukocytosis: sec to above, resolved,     3.     Disposition: in house, An elderly female with Perforated Appendicitis admitted with abd pain, found to have abdominal abscess, now has drains in place, surgery on board, no plan for surgery during present  admission, Diarrhea appears to be infection / antibiotics related,   - Follow Pending cul,   - Continue IV Zosyn, pharm to dose,   - PICC line,   -Diet per surgery team,     Discussed with patient, RN, all questions answered, further recommendations to follow, Thanks     Thank you for consulting [facility] for Merissa Oshea.  If you have any questions or concerns please call [facility] Infectious Disease at [phone].     Jung Garcia MD  4/22/2024  10:16 AM

## 2024-04-22 NOTE — PROGRESS NOTES
[facility]  Progress Note    Merissa Oshea Patient Status:  Inpatient    1950 MRN [id number]   Location [facility] 3NW-A Attending Shanice Flowers MD   Hosp Day # 3 PCP Cruz Puckett MD     Subjective:    Patient feels improved pain and bloating since drains were placed. She is tolerating clear liquids    Objective/Physical Exam:    Vital Signs:  Blood pressure 128/63, pulse 55, temperature 98.1 °F (36.7 °C), temperature source Oral, resp. rate 18, weight 118 lb (53.5 kg), SpO2 100%, not currently breastfeeding.    General:  Alert, orientated x3.  Cooperative.  No apparent distress.    Lungs:  Non labored breathing    Cardiac:  Regular rate and rhythm.     Abdomen:  RLQ drain with thick purulent drainage, posterior drain cloudy though thinner drainage, soft, improved distension and tenderness     Extremities:  No lower extremity edema noted.  Without clubbing or cyanosis.        Labs:  Reviewed    Lab Results   Component Value Date    K 3.1 2024       Xray:  Reviewed    Problem List:  Patient Active Problem List   Diagnosis    Lumbar stenosis    Decreased strength of upper extremity    Plantar neuroma of left foot    Capsular contracture of breast implant    Depressive disorder    Hearing loss    Martinez's metatarsalgia    Bradycardia    Diarrhea of presumed infectious origin    Acute appendicitis with perforation, generalized peritonitis, and abscess, unspecified whether gangrene present    Dehydration    Hypokalemia    Hyponatremia           Impression:     74 y/o S/P: IR drain placement for perforated appendicitis with abscess  IR drain placement, pain and distension improved  Afebrile, leukocytosis improved  Cx: e coli strep    Plan:    Antibiotics per ID  Ok to advance to full liquids  Encourage ambulation/IS  Reviewed with patient she will be dc home with drains when appropriate to schedule interval appendectomy  All questions answered    MOO Solares  Formerly [facility] and  [facility]  General Surgery  4/22/2024

## 2024-04-22 NOTE — IMAGING NOTE
S/p perc drain x 2.  Complex fluid collections/abscesses from ruptured appy.  Ant 100 ml, post 10 ml outputs last 24 hrs.  CPM.

## 2024-04-22 NOTE — PLAN OF CARE
Problem: Patient/Family Goals  Goal: Patient/Family Long Term Goal  Description: Patient's Long Term Goal: comfort    Interventions:  - prn pain med  -NPO  -Planned IR drainage  - See additional Care Plan goals for specific interventions  Outcome: Progressing  Goal: Patient/Family Short Term Goal  Description: Patient's Short Term Goal: Back to normal routine    Interventions:   - IV antibiotic  -Safety precautions  -IVF  -Abscess drainage  - See additional Care Plan goals for specific interventions  Outcome: Progressing     Problem: PAIN - ADULT  Goal: Verbalizes/displays adequate comfort level or patient's stated pain goal  Description: INTERVENTIONS:  - Encourage pt to monitor pain and request assistance  - Assess pain using appropriate pain scale  - Administer analgesics based on type and severity of pain and evaluate response  - Implement non-pharmacological measures as appropriate and evaluate response  - Consider cultural and social influences on pain and pain management  - Manage/alleviate anxiety  - Utilize distraction and/or relaxation techniques  - Monitor for opioid side effects  - Notify MD/LIP if interventions unsuccessful or patient reports new pain  - Anticipate increased pain with activity and pre-medicate as appropriate  Outcome: Progressing     Problem: SAFETY ADULT - FALL  Goal: Free from fall injury  Description: INTERVENTIONS:  - Assess pt frequently for physical needs  - Identify cognitive and physical deficits and behaviors that affect risk of falls.  - [place] fall precautions as indicated by assessment.  - Educate pt/family on patient safety including physical limitations  - Instruct pt to call for assistance with activity based on assessment  - Modify environment to reduce risk of injury  - Provide assistive devices as appropriate  - Consider OT/PT consult to assist with strengthening/mobility  - Encourage toileting schedule  Outcome: Progressing     Problem: RISK FOR INFECTION -  ADULT  Goal: Absence of fever/infection during anticipated neutropenic period  Description: INTERVENTIONS  - Monitor WBC  - Administer growth factors as ordered  - Implement neutropenic guidelines  Outcome: Progressing     Problem: DISCHARGE PLANNING  Goal: Discharge to home or other facility with appropriate resources  Description: INTERVENTIONS:  - Identify barriers to discharge w/pt and caregiver  - Include patient/family/discharge partner in discharge planning  - Arrange for needed discharge resources and transportation as appropriate  - Identify discharge learning needs (meds, wound care, etc)  - Arrange for interpreters to assist at discharge as needed  - Consider post-discharge preferences of patient/family/discharge partner  - Complete POLST form as appropriate  - Assess patient's ability to be responsible for managing their own health  - Refer to Case Management Department for coordinating discharge planning if the patient needs post-hospital services based on physician/LIP order or complex needs related to functional status, cognitive ability or social support system  Outcome: Progressing   Alert and orient x 4 , on room air , vitals stable . Abdomen soft , tender , passing gas , tolerated clear liquid diet , advanced to full liquid diet , had a BM today , voids well , getting iv antibiotic . Up in room , voids well , plan of care discussed , answered all questions . Verbalized understanding . Will continue to monitor

## 2024-04-22 NOTE — PROGRESS NOTES
[facility]   part of [facility] Hospitalist Progress Note     Merissa Oshea Patient Status:  Inpatient    1950 MRN [id number]   Location [facility] 3NW-A Attending Shanice Flowers MD   Hosp Day # 3 PCP Cruz Puckett MD     Chief Complaint:   FU: Abd pain, diarrhea    Subjective:     Patient seen and examined.   Ambulating, pain improving.  Denies CP/SOB.   Some watery diarrhea.   No nausea or vomiting  2 drains in place - bulb with cloudy milky fluid    Objective:    Review of Systems:   10 point ROS completed and was negative, except for pertinent positive and negatives stated in subjective.    Vital signs:  Temp:  [98 °F (36.7 °C)-98.1 °F (36.7 °C)] 98.1 °F (36.7 °C)  Pulse:  [47-61] 55  Resp:  [18] 18  BP: (117-140)/(56-68) 128/63  SpO2:  [100 %] 100 %    Physical Exam:    General: No acute distress.   HEENT:  EOMI, PERRLA, OP clear  Respiratory: Clear to auscultation bilaterally. No wheezes. No rhonchi.  Cardiovascular: S1, S2. Regular rate and rhythm. No murmurs.  Abdomen: Soft, nontender, nondistended.  Positive bowel sounds. No rebound or guarding.  Extremities: No edema.  Neuro:  Grossly non focal, no motor deficits.        Diagnostic Data:    Labs:  Recent Labs   Lab 24  1350 24  0624  0603   WBC 17.3* 17.0* 13.0* 9.0   HGB 13.9 13.0 12.2 10.4*   MCV 84.5 84.7 83.9 88.1   .0 290.0 281.0 328.0   BAND 14  --   --   --    INR  --  1.22* 1.24*  --        Recent Labs   Lab 24  1350 24  0612 24  0603 24  0536   * 92 84 93  --    BUN 13 12 10 5*  --    CREATSERUM 0.74 0.72 0.64 0.54*  --    CA 8.5 8.0* 7.8* 7.4*  --    ALB 2.4*  --   --   --   --    * 132* 136 136  --    K 2.7* 3.1* 3.2* 3.0*  3.0* 3.1*   CL 89* 99 103 104  --    CO2 30.0 25.0 25.0 23.0  --    ALKPHO 103  --   --   --   --    AST 27  --   --   --   --    ALT 26  --   --   --   --    BILT 0.5  --   --    --   --    TP 6.5  --   --   --   --        Estimated Creatinine Clearance: 80.7 mL/min (A) (based on SCr of 0.54 mg/dL (L)).    Recent Labs   Lab 04/19/24 2120 04/20/24  0612   PTP 15.4* 15.6*   INR 1.22* 1.24*            COVID-19 Lab Results    COVID-19  Lab Results   Component Value Date    COVID19 Not Detected 09/24/2023    COVID19 Detected (A) 01/04/2023       Pro-Calcitonin  No results for input(s): \"PCT\" in the last 168 hours.    Cardiac  No results for input(s): \"TROP\", \"PBNP\" in the last 168 hours.    Creatinine Kinase  No results for input(s): \"CK\" in the last 168 hours.    Inflammatory Markers  No results for input(s): \"CRP\", \"LYNDA\", \"LDH\", \"DDIMER\" in the last 168 hours.    Imaging: Imaging data reviewed in Epic.    Medications:    potassium chloride  40 mEq Oral Q4H    sodium chloride  500 mL Intravenous Once    ondansetron  4 mg Intravenous Once    enoxaparin  40 mg Subcutaneous Daily    piperacillin-tazobactam  3.375 g Intravenous Q8H       Assessment & Plan:     Merissa Oshea is a 73 year old female with no sig PMH presents today with c/o abd pain and diarrhea x 7 days.      Sepsis 2/2 perforated appendicitis  Intra abdominal abscesses 2/2 above s/p IR drain placement 4/20/24  -admit  -iv zosyn - drain cx with e.coli and streptococcus, ID consulted  -drains placed via IR - monitor output  -surgery following >> apprec rec  -monitor labs >> WBC downtrending  -supportiive care: ivf, pain meds, antiemetics  -resume diet /ADAT per surgery   -ID consulted >> apprec recs     Hyponatremia  Hypokalemia  -replace per protocol  -repeat labs show some improvement  -cont electrolyte protocol          Quality:  DVT Prophylaxis: lovenox, scds  CODE status: full code  Pike: no  If COVID testing is negative, may discontinue isolation: yes      Plan of care discussed with patient and daughter, and all questions answered.     DISPO:  Cont inpt  Sp IR drain placement  Surgery following        Michael Martinez  MD [facility]ist  Pager [phone]  Answering Service number: [phone]            **Certification      PHYSICIAN Certification of Need for Inpatient Hospitalization - Initial Certification    Patient will require inpatient services that will reasonably be expected to span two midnight's based on the clinical documentation in H+P.   Based on patients current state of illness, I anticipate that, after discharge, patient will require TBD.

## 2024-04-22 NOTE — PROGRESS NOTES
[facility]   part of [facility] Hospitalist Progress Note     Merissa Oshea Patient Status:  Inpatient    1950 MRN [id number]   Location [facility] 3NW-A Attending Shanice Flowers MD   Hosp Day # 2 PCP Cruz Puckett MD     Chief Complaint:   FU: Abd pain, diarrhea    Subjective:     Patient seen and examined.   , still painful  afebrile  No nausea or vomiting  2 drains in place - bulb with cloudy milky fluid    Objective:    Review of Systems:   10 point ROS completed and was negative, except for pertinent positive and negatives stated in subjective.    Vital signs:  Temp:  [98 °F (36.7 °C)-98.8 °F (37.1 °C)] 98.1 °F (36.7 °C)  Pulse:  [47-65] 47  Resp:  [16-18] 18  BP: (104-140)/(54-68) 140/56  SpO2:  [99 %-100 %] 100 %    Physical Exam:    General: No acute distress.   HEENT:  EOMI, PERRLA, OP clear  Respiratory: Clear to auscultation bilaterally. No wheezes. No rhonchi.  Cardiovascular: S1, S2. Regular rate and rhythm. No murmurs.  Abdomen: Soft, nontender, nondistended.  Positive bowel sounds. No rebound or guarding.  Extremities: No edema.  Neuro:  Grossly non focal, no motor deficits.        Diagnostic Data:    Labs:  Recent Labs   Lab 24  1350 24  0603   WBC 17.3* 17.0* 13.0* 9.0   HGB 13.9 13.0 12.2 10.4*   MCV 84.5 84.7 83.9 88.1   .0 290.0 281.0 328.0   BAND 14  --   --   --    INR  --  1.22* 1.24*  --        Recent Labs   Lab 24  1350 24  0624  0603   * 92 84 93   BUN 13 12 10 5*   CREATSERUM 0.74 0.72 0.64 0.54*   CA 8.5 8.0* 7.8* 7.4*   ALB 2.4*  --   --   --    * 132* 136 136   K 2.7* 3.1* 3.2* 3.0*  3.0*   CL 89* 99 103 104   CO2 30.0 25.0 25.0 23.0   ALKPHO 103  --   --   --    AST 27  --   --   --    ALT 26  --   --   --    BILT 0.5  --   --   --    TP 6.5  --   --   --        Estimated Creatinine Clearance: 80.7 mL/min (A) (based on SCr  of 0.54 mg/dL (L)).    Recent Labs   Lab 04/19/24 2120 04/20/24  0612   PTP 15.4* 15.6*   INR 1.22* 1.24*            COVID-19 Lab Results    COVID-19  Lab Results   Component Value Date    COVID19 Not Detected 09/24/2023    COVID19 Detected (A) 01/04/2023       Pro-Calcitonin  No results for input(s): \"PCT\" in the last 168 hours.    Cardiac  No results for input(s): \"TROP\", \"PBNP\" in the last 168 hours.    Creatinine Kinase  No results for input(s): \"CK\" in the last 168 hours.    Inflammatory Markers  No results for input(s): \"CRP\", \"LYNDA\", \"LDH\", \"DDIMER\" in the last 168 hours.    Imaging: Imaging data reviewed in Epic.    Medications:    sodium chloride  500 mL Intravenous Once    ondansetron  4 mg Intravenous Once    enoxaparin  40 mg Subcutaneous Daily    piperacillin-tazobactam  3.375 g Intravenous Q8H       Assessment & Plan:     Merissa Oshea is a 73 year old female with no sig PMH presents today with c/o abd pain and diarrhea x 7 days.      Sepsis 2/2 perforated appendicitis  Intra abdominal abscesses 2/2 above s/p IR drain placement 4/20/24  -admit  -iv zosyn  -drains placed via IR  -surgery following >> apprec rec  -monitor labs >> WBC downtrending  -supportiive care: ivf, pain meds, antiemetics  -resume diet   -ID consulted >> apprec recs     Hyponatremia  Hypokalemia  -replace per protocol  -repeat labs show some improvement  -cont electrolyte protocol          Quality:  DVT Prophylaxis: lovenox, scds  CODE status: full code  Pike: no  If COVID testing is negative, may discontinue isolation: yes      Plan of care discussed with patient and daughter, and all questions answered.     DISPO:  Cont inpt  Sp IR drain placement  Surgery following        Shan[facility] Hospitalist  Pager [phone]  Answering Service number: [phone]            **Certification      PHYSICIAN Certification of Need for Inpatient Hospitalization - Initial Certification    Patient will require inpatient services  that will reasonably be expected to span two midnight's based on the clinical documentation in H+P.   Based on patients current state of illness, I anticipate that, after discharge, patient will require TBD.

## 2024-04-22 NOTE — BH PROGRESS NOTE
Pt is alert and oriented, vss on room air. Denies SOB, clear lung sounds on assessment. Mild bdominal tenderness, tylenol prn provided.  Tolerates clears, denied nausea. IVF infusing, Zosyn as scheduled. IR drains flushed and aspirated as ordered. Output is tan, thick at right,clearer at left. Voids adequately.Poc discussed, up stand by, call light in reach    0646:Dr Lua notified of the ID consult from Dr Flowers

## 2024-04-22 NOTE — SPIRITUAL CARE NOTE
Spiritual Care Visit Note    Patient Name: Merissa Oshea Date of Spiritual Care Visit: 24   : 1950 Primary Dx: Acute appendicitis with perforation, generalized peritonitis, and abscess, unspecified whether gangrene present       Referred By:      Spiritual Care Taxonomy:    Intended Effects: Demonstrate caring and concern (Patient indicated that she is doing well and did not require spiritual care at this time.)              Visit Type/Summary:     - Spiritual Care:  remains available as needed for follow up.    Spiritual Care support can be requested via an [facility] consult. For urgent/immediate needs, please contact the On Call  at: Edward: ext [phone]

## 2024-04-23 LAB
ALBUMIN SERPL-MCNC: 1.8 G/DL (ref 3.4–5)
ALBUMIN/GLOB SERPL: 0.6 {RATIO} (ref 1–2)
ALP LIVER SERPL-CCNC: 61 U/L
ALT SERPL-CCNC: 20 U/L
ANION GAP SERPL CALC-SCNC: 6 MMOL/L (ref 0–18)
AST SERPL-CCNC: 22 U/L (ref 15–37)
BASOPHILS # BLD AUTO: 0.02 X10(3) UL (ref 0–0.2)
BASOPHILS NFR BLD AUTO: 0.3 %
BILIRUB SERPL-MCNC: 0.4 MG/DL (ref 0.1–2)
BUN BLD-MCNC: 2 MG/DL (ref 9–23)
CALCIUM BLD-MCNC: 7.7 MG/DL (ref 8.5–10.1)
CHLORIDE SERPL-SCNC: 107 MMOL/L (ref 98–112)
CO2 SERPL-SCNC: 25 MMOL/L (ref 21–32)
CREAT BLD-MCNC: 0.62 MG/DL
EGFRCR SERPLBLD CKD-EPI 2021: 95 ML/MIN/1.73M2 (ref 60–?)
EOSINOPHIL # BLD AUTO: 0.07 X10(3) UL (ref 0–0.7)
EOSINOPHIL NFR BLD AUTO: 1.1 %
ERYTHROCYTE [DISTWIDTH] IN BLOOD BY AUTOMATED COUNT: 13.1 %
GLOBULIN PLAS-MCNC: 3.2 G/DL (ref 2.8–4.4)
GLUCOSE BLD-MCNC: 100 MG/DL (ref 70–99)
HCT VFR BLD AUTO: 30.3 %
HGB BLD-MCNC: 10.3 G/DL
IMM GRANULOCYTES # BLD AUTO: 0.13 X10(3) UL (ref 0–1)
IMM GRANULOCYTES NFR BLD: 2 %
LYMPHOCYTES # BLD AUTO: 0.93 X10(3) UL (ref 1–4)
LYMPHOCYTES NFR BLD AUTO: 14.5 %
MCH RBC QN AUTO: 29.5 PG (ref 26–34)
MCHC RBC AUTO-ENTMCNC: 34 G/DL (ref 31–37)
MCV RBC AUTO: 86.8 FL
MONOCYTES # BLD AUTO: 0.78 X10(3) UL (ref 0.1–1)
MONOCYTES NFR BLD AUTO: 12.2 %
NEUTROPHILS # BLD AUTO: 4.48 X10 (3) UL (ref 1.5–7.7)
NEUTROPHILS # BLD AUTO: 4.48 X10(3) UL (ref 1.5–7.7)
NEUTROPHILS NFR BLD AUTO: 69.9 %
OSMOLALITY SERPL CALC.SUM OF ELEC: 282 MOSM/KG (ref 275–295)
PLATELET # BLD AUTO: 374 10(3)UL (ref 150–450)
POTASSIUM SERPL-SCNC: 3.4 MMOL/L (ref 3.5–5.1)
POTASSIUM SERPL-SCNC: 4 MMOL/L (ref 3.5–5.1)
PROT SERPL-MCNC: 5 G/DL (ref 6.4–8.2)
RBC # BLD AUTO: 3.49 X10(6)UL
SODIUM SERPL-SCNC: 138 MMOL/L (ref 136–145)
WBC # BLD AUTO: 6.4 X10(3) UL (ref 4–11)

## 2024-04-23 PROCEDURE — 84132 ASSAY OF SERUM POTASSIUM: CPT | Performed by: INTERNAL MEDICINE

## 2024-04-23 PROCEDURE — 80053 COMPREHEN METABOLIC PANEL: CPT | Performed by: HOSPITALIST

## 2024-04-23 PROCEDURE — 85025 COMPLETE CBC W/AUTO DIFF WBC: CPT | Performed by: HOSPITALIST

## 2024-04-23 RX ORDER — POTASSIUM CHLORIDE 20 MEQ/1
40 TABLET, EXTENDED RELEASE ORAL EVERY 4 HOURS
Status: COMPLETED | OUTPATIENT
Start: 2024-04-23 | End: 2024-04-23

## 2024-04-23 RX ORDER — ERTAPENEM 1 G/1
1 INJECTION, POWDER, LYOPHILIZED, FOR SOLUTION INTRAMUSCULAR; INTRAVENOUS DAILY
Qty: 21 EACH | Refills: 0 | Status: SHIPPED | OUTPATIENT
Start: 2024-04-23 | End: 2024-04-23

## 2024-04-23 RX ORDER — ERTAPENEM 1 G/1
1 INJECTION, POWDER, LYOPHILIZED, FOR SOLUTION INTRAMUSCULAR; INTRAVENOUS DAILY
Qty: 21 EACH | Refills: 0 | Status: SHIPPED | OUTPATIENT
Start: 2024-04-23 | End: 2024-04-24

## 2024-04-23 NOTE — PROGRESS NOTES
Pt reports mild soreness to drain sites. Denies need for pain medication at this time. Pt reports loose stools. Passing flatus. Denies nausea. Tolerating full liquids. IVF discontinued. IV Abx infusing. IR drains flushed and aspirated. Potassium replaced per protocol with oral tabs. Pt is voiding without difficulty. Ambulatory. Discussed possible discharge home later today. PICC line placed yesterday for IV abx at discharge. SW aware of pts preference for home infusions, awaiting final abx orders.

## 2024-04-23 NOTE — PLAN OF CARE
Pt AOx4, VSS. Up ad jim, steady gait. Ambulates often and tolerates well. Pt on room air; denies RASHMI/SOB/lightheadedness. Pt reports pain is minimal at insertion sites of IR drains; pt rates pain at about 1/10 to 2/10. Occasional PRN tylenol 500 mg for pain management + non-pharm methods such as distraction, ambulation, toileting schedule, reposition, rest & relaxation w/ lavender patches. SCDs continued at bed time. IV fluids continued per orders. PICC line flushed and blood return noted; now infusing IV fluids & ordered ABX continued. TYREL #1 (right groin/hip area) draining moderate amounts thick, tan, odorous drainage to bulb suction. This TYREL bulb flushed with 10 ml NS and aspirated 6 ml. TYREL #2 (left back/buttocks area) draining very minimal amounts light tan/pink tinged, thick, drainage. This TYREL flushed 10 ml NS and aspirated 10 ml NS.  Pt tolerates flush & aspiration (q-shift) of IR TYREL drains well. Pt on full liquid diet, tolerates with no nausea, bowel sounds present x4, multiple formed BMs reported in the day. Voids in adequate amounts, yellow/clear urine. Fall & safety precautions in place. POC discussed.

## 2024-04-23 NOTE — CM/SW NOTE
Billing was able to get insurance auth for pt's IV ABX at the [facility].  Needs IV ABX order updated with diagnosis code then pt's IV ABX infusion appointments can be set up.

## 2024-04-23 NOTE — CM/SW NOTE
Final IV Invanz order is written but just needs a diagnosis code added to it.  Spoke with pt who [facility].  She seems most comfortable with this plan.  She wants to come to the [facility].  Called Fiorella at the [facility] [phone] who said she will check with billing to confirm the insurance authorization.  Pt will need the IV ABX until 5/14.  Pt will most likely be ready for dc tomorrow 4/24 - she can get a dose of IV Invanz before dc and then come to the [facility] starting on Thursday 4/25.  Will confirm appointment time with Fiorella at the [facility] once she gets insurance auth.

## 2024-04-23 NOTE — PROGRESS NOTES
[facility]   part of [facility] Infectious Disease Consult    Merissa RAI Dayo Patient Status:  Inpatient    1950 MRN [id number]   Location [facility] 3NW-A Attending Shanice Flowers MD   Hosp Day # 4 PCP MD Merissa Montague CECELIA Dayo seen and examined,  Afebrile,   Previous entries noted,   Sitting up,   Had full liquid diet,   Woke up 4x at night for diarrhea,       History:  Past Medical History:    COVID    Mitral valve prolapse    Strep throat    Visual impairment    reading glasses     Past Surgical History:   Procedure Laterality Date    Arthroscopy of joint unlisted Right 2018          Colonoscopy,diagnostic      Dr. Seo    Colonoscopy,diagnostic  2018    hepatic flexure polyp    Egd N/A 2018    Procedure: ESOPHAGOGASTRODUODENOSCOPY, COLONOSCOPY, POSSIBLE BIOPSY, POSSIBLE POLYPECTOMY 46919, 44436;  Surgeon: Yan Quiñones MD;  Location: [facility]    Enlarge breast with implant Bilateral 2001    Implant left      Implant right      Other surgical history      abdominoplasty    Rev lower eyelid exten fat pad  10/14/08    Performed by LISA FERRARA at [facility]    Rev lower eyelid exten fat pad  10/14/08    Performed by LISA FERRARA at [facility]    Rev upper eyelid w excess skin  10/14/08    Performed by LISA FERRARA at [facility]    Rev upper eyelid w excess skin  10/14/08    Performed by LISA FERRARA at [facility]    Shoulder arthroscopy Right 2019    manipulation    Upper gi endoscopy,diagnosis  2018    hiatal hernia, gastric and SB Bx taken     Family History   Problem Relation Age of Onset    Cancer Mother         lung/brain/liver    Other (Other) Other         self-MVP    Heart Disorder Father     Hypertension Neg     Lipids Neg     Obesity Neg     Psychiatric Neg       reports that she has never smoked. She has never used  smokeless tobacco. She reports current alcohol use. She reports that she does not use drugs.    Allergies:  No Known Allergies    Medications:    Current Facility-Administered Medications:     potassium chloride (K-Dur) tab 40 mEq, 40 mEq, Oral, Q4H    nystatin (Mycostatin) 579644 UNIT/ML oral suspension 500,000 Units, 5 mL, Oral, QID    sodium chloride 0.9 % IV bolus 500 mL, 500 mL, Intravenous, Once    sodium chloride 0.9% infusion, , Intravenous, Continuous    ondansetron (Zofran) 4 MG/2ML injection 4 mg, 4 mg, Intravenous, Once    ondansetron (Zofran) 4 MG/2ML injection 4 mg, 4 mg, Intravenous, Q1H PRN    sodium chloride 0.9% infusion, , Intravenous, Continuous    enoxaparin (Lovenox) 40 MG/0.4ML SUBQ injection 40 mg, 40 mg, Subcutaneous, Daily    acetaminophen (Tylenol Extra Strength) tab 500 mg, 500 mg, Oral, Q4H PRN    morphINE PF 2 MG/ML injection 1 mg, 1 mg, Intravenous, Q2H PRN **OR** morphINE PF 2 MG/ML injection 2 mg, 2 mg, Intravenous, Q2H PRN **OR** morphINE PF 4 MG/ML injection 4 mg, 4 mg, Intravenous, Q2H PRN    melatonin tab 3 mg, 3 mg, Oral, Nightly PRN    polyethylene glycol (PEG 3350) (Miralax) 17 g oral packet 17 g, 17 g, Oral, Daily PRN    sennosides (Senokot) tab 17.2 mg, 17.2 mg, Oral, Nightly PRN    bisacodyl (Dulcolax) 10 MG rectal suppository 10 mg, 10 mg, Rectal, Daily PRN    fleet enema (Fleet) 7-19 GM/118ML rectal enema 133 mL, 1 enema, Rectal, Once PRN    ondansetron (Zofran) 4 MG/2ML injection 4 mg, 4 mg, Intravenous, Q6H PRN    metoclopramide (Reglan) 5 mg/mL injection 10 mg, 10 mg, Intravenous, Q8H PRN    sodium chloride 0.9% infusion, , Intravenous, Continuous    piperacillin-tazobactam (Zosyn) 3.375 g in dextrose 5% 100 mL IVPB-ADDV, 3.375 g, Intravenous, Q8H    Review of Systems:   Constitutional: Negative for anorexia, chills, fatigue, fevers, malaise, night sweats and weight loss.  Eyes: Negative for visual disturbance, irritation and redness.  Ears, nose, mouth, throat, and  face: Negative for hearing loss, tinnitus, nasal congestion, snoring, sore throat, hoarseness and voice change.  Respiratory: Negative for cough, sputum, hemoptysis, chest pain, wheezing, dyspnea on exertion, or stridor.  Cardiovascular: Negative for chest pain, palpitations, irregular heart beats, syncope, fatigue, orthopnea, paroxysmal nocturnal dyspnea, lower extremity edema.  Gastrointestinal: Negative for dysphagia, odynophagia, reflux symptoms, nausea, vomiting, change in bowel habits, diarrhea, constipation and abdominal pain.  Integument/breast: Negative for rash, skin lesions, and pruritus.  Hematologic/lymphatic: Negative for easy bruising, bleeding, and lymphadenopathy.  Musculoskeletal: Negative for myalgias, arthralgias, muscle weakness.  Neurological: Negative for headaches, dizziness, seizures, memory problems, trouble swallowing, speech problems, gait problems and weakness.  Behavioral/Psych: Negative for active tobacco use.  Endocrine: No history of of diabetes, thyroid disorder.  All other review of systems are negative.    Vital signs in last 24 hours:  Patient Vitals for the past 24 hrs:   BP Temp Temp src Pulse Resp SpO2   04/22/24 0428 128/63 98.1 °F (36.7 °C) Oral 55 18 100 %   04/21/24 1930 140/56 98.1 °F (36.7 °C) Oral (!) 47 18 100 %   04/21/24 1225 117/68 98 °F (36.7 °C) Oral 61 18 100 %       Physical Exam:   General: alert, cooperative, oriented.  No respiratory distress.   Head: Normocephalic, without obvious abnormality, atraumatic.   Eyes: Conjunctivae/corneas clear.  No scleral icterus.  No conjunctival     hemorrhage.   Nose: Nares normal.   Throat: Lips, mucosa, and tongue normal.  + thrush noted.   Neck: Soft, supple neck; trachea midline,    Lungs: CTAB, normal and equal bilateral chest rise   Chest wall: No tenderness or deformity.   Heart: Regular rate and rhythm, normal S1S2, no murmur.   Abdomen: soft, tender, non-distended, drains in place, positive BS.   Extremity: no  edema, no cyanosis   Skin: No rashes or lesions.   Neurological: Alert, interactive, no focal deficits    Labs:  Lab Results   Component Value Date    WBC 6.4 04/23/2024    HGB 10.3 04/23/2024    HCT 30.3 04/23/2024    .0 04/23/2024    CREATSERUM 0.62 04/23/2024    BUN 2 04/23/2024     04/23/2024    K 3.4 04/23/2024     04/23/2024    CO2 25.0 04/23/2024     04/23/2024    CA 7.7 04/23/2024    ALB 1.8 04/23/2024    ALKPHO 61 04/23/2024    BILT 0.4 04/23/2024    TP 5.0 04/23/2024    AST 22 04/23/2024    ALT 20 04/23/2024       Radiology:  CT- 1. There is wall thickening of the appendix with fluid.  There is irregularity of the distal appendix suggestive of perforation.   2. Large pelvic fluid collections measuring 10.7 x 4.9 x 10.9 cm and 7.9 x 5.2 x 6.7 cm suggestive of abscesses.   3. There is wall thickening of small bowel loops within the pelvis as well as the rectosigmoid portion of the colon which may be secondary to is secondary inflammation.   4. This critical value result was called to Dr. Del Rio on 4/19/2024 at 3:47 p.m..  Results read back was performed       Cultures:  Reviewed,     Assessment and Plan:    1.   Acute Perforated Appendicitis: admitted with abdominal pain, diarrhea of a wk,   - CT with perforated appendix, Large pelvic fluid ~10.7 x 4.9 x 10.9 cm and 7.9 x 5.2 x 6.7 cm suggestive of abscesses.   - No blood cul found,   - Noted surgery input, No plan for intervention, Diet per surgery service,   - Seen by IR, Drains in place, Drainage GS polymicrobial, Cul with E coli, Strep Anginosis,   - On IV Zosyn, pharm to dose,  - PICC line in place, will like to do IV abx at home, used to be a phlebotomist,      2.     Leukocytosis: sec to above, resolved,     3.     Disposition: in house, An elderly female with Perforated Appendicitis admitted with abd pain, found to have abdominal abscess, now has drains in place, surgery on board, no plan for surgery during present admission,  Diarrhea appears to be infection / antibiotics related,   - Follow Pending cul,   - Continue IV Zosyn, pharm to dose,   - PICC line,   - Diet per surgery team,     Discussed with patient, RN, all questions answered, further recommendations to follow, Will plan to discharge on IV Invanz, Thanks     Thank you for consulting [facility] for Merissa Oshea.  If you have any questions or concerns please call [facility] Infectious Disease at [phone].     Jung Garcia MD  4/22/2024  10:16 AM

## 2024-04-23 NOTE — IMAGING NOTE
72 yo F pelvic abscesses x 2 s/p pigtail cath placement.  Multi organism cultures positive.  Output in range of 70 ml from anterior drain and 5-15 ml charted from gluteal.  Continue drains until consistently <10-15 ml from each tube.  This can be done as outpatient, typically in 1-2 weeks.

## 2024-04-23 NOTE — PROGRESS NOTES
[facility]   part of [facility] Hospitalist Progress Note     Merissa Oshea Patient Status:  Inpatient    1950 MRN [id number]   Location [facility] 3NW-A Attending Shanice Flowers MD   Hosp Day # 4 PCP Cruz Puckett MD     Chief Complaint:   FU: Abd pain, diarrhea    Subjective:     Patient seen and examined.   Ambulating, pain improving.  Denies CP/SOB.    2 drains in place - bulb with cloudy milky fluid    Objective:    Review of Systems:   10 point ROS completed and was negative, except for pertinent positive and negatives stated in subjective.    Vital signs:  Temp:  [97.1 °F (36.2 °C)-98.5 °F (36.9 °C)] 97.1 °F (36.2 °C)  Pulse:  [61-67] 61  Resp:  [16-18] 16  BP: (115-132)/(62-68) 132/68  SpO2:  [98 %-100 %] 100 %    Physical Exam:    General: No acute distress.   HEENT:  EOMI, PERRLA, OP clear  Respiratory: Clear to auscultation bilaterally. No wheezes. No rhonchi.  Cardiovascular: S1, S2. Regular rate and rhythm. No murmurs.  Abdomen: Soft, nontender, nondistended.  Positive bowel sounds. No rebound or guarding.  Extremities: No edema.  Neuro:  Grossly non focal, no motor deficits.        Diagnostic Data:    Labs:  Recent Labs   Lab 24  1350 240 24  0612 24  0603 24  0530   WBC 17.3* 17.0* 13.0* 9.0 6.4   HGB 13.9 13.0 12.2 10.4* 10.3*   MCV 84.5 84.7 83.9 88.1 86.8   .0 290.0 281.0 328.0 374.0   BAND 14  --   --   --   --    INR  --  1.22* 1.24*  --   --        Recent Labs   Lab 24  1350 24  2120 24  0612 24  0603 24  0536 24  2106 24  0530   *   < > 84 93  --   --  100*   BUN 13   < > 10 5*  --   --  2*   CREATSERUM 0.74   < > 0.64 0.54*  --   --  0.62   CA 8.5   < > 7.8* 7.4*  --   --  7.7*   ALB 2.4*  --   --   --   --   --  1.8*   *   < > 136 136  --   --  138   K 2.7*   < > 3.2* 3.0*  3.0* 3.1* 3.4* 3.4*   CL 89*   < > 103 104  --   --  107   CO2 30.0   <  > 25.0 23.0  --   --  25.0   ALKPHO 103  --   --   --   --   --  61   AST 27  --   --   --   --   --  22   ALT 26  --   --   --   --   --  20   BILT 0.5  --   --   --   --   --  0.4   TP 6.5  --   --   --   --   --  5.0*    < > = values in this interval not displayed.       Estimated Creatinine Clearance: 70.3 mL/min (based on SCr of 0.62 mg/dL).    Recent Labs   Lab 04/19/24 2120 04/20/24  0612   PTP 15.4* 15.6*   INR 1.22* 1.24*            COVID-19 Lab Results    COVID-19  Lab Results   Component Value Date    COVID19 Not Detected 09/24/2023    COVID19 Detected (A) 01/04/2023       Pro-Calcitonin  No results for input(s): \"PCT\" in the last 168 hours.    Cardiac  No results for input(s): \"TROP\", \"PBNP\" in the last 168 hours.    Creatinine Kinase  No results for input(s): \"CK\" in the last 168 hours.    Inflammatory Markers  No results for input(s): \"CRP\", \"LYNDA\", \"LDH\", \"DDIMER\" in the last 168 hours.    Imaging: Imaging data reviewed in Epic.    Medications:    nystatin  5 mL Oral QID    sodium chloride  500 mL Intravenous Once    ondansetron  4 mg Intravenous Once    enoxaparin  40 mg Subcutaneous Daily    piperacillin-tazobactam  3.375 g Intravenous Q8H       Assessment & Plan:     Merissa Oshea is a 73 year old female with no sig PMH presents today with c/o abd pain and diarrhea x 7 days.      Sepsis 2/2 perforated appendicitis  Intra abdominal abscesses 2/2 above s/p IR drain placement 4/20/24  -admit  -iv zosyn - drain cx with e.coli and streptococcus (now polymicrobial), ID consulted  -drains placed via IR - monitor output  -surgery following >> apprec rec  -monitor labs >> WBC downtrending  -supportiive care: ivf, pain meds, antiemetics  -resume diet /ADAT per surgery   -ID consulted >> apprec recs     Hyponatremia  Hypokalemia  -replace per protocol  -repeat labs show some improvement  -cont electrolyte protocol          Quality:  DVT Prophylaxis: lovenox, scds  CODE status: full code  Pike: no  If  COVID testing is negative, may discontinue isolation: yes      Plan of care discussed with patient and daughter, and all questions answered.     DISPO:  Cont inpt  Sp IR drain placement  Surgery following        [facility] Hospitalist  Pager [phone]  Answering Service number: [phone]

## 2024-04-23 NOTE — PROGRESS NOTES
[facility]  Progress Note    Merissa Oshea Patient Status:  Inpatient    1950 MRN [id number]   Location [facility] 3NW-A Attending Shanice Flowers MD   Hosp Day # 4 PCP Cruz Puckett MD     Subjective:    Patient is tolerating full liquids  She is passing loose stools  Pain continues to improve   Drain output overnight # 1 36cc, # 2 15cc    Objective/Physical Exam:    Vital Signs:  Blood pressure 128/62, pulse 62, temperature 98.2 °F (36.8 °C), temperature source Oral, resp. rate 18, weight 118 lb (53.5 kg), SpO2 100%, not currently breastfeeding.    General:  Alert, orientated x3.  Cooperative.  No apparent distress.    Lungs:  Non labored breathing    Cardiac:  Regular rate and rhythm.     Abdomen:  IR drain left buttock with serous thin output, IR drain in RLQ thick purulent drainage   Soft, mildly distended in lower quadrants, mild tenderness no peritonitis     Extremities:  No lower extremity edema noted.  Without clubbing or cyanosis.        Labs:  Reviewed    Lab Results   Component Value Date    WBC 6.4 2024    HGB 10.3 2024    HCT 30.3 2024    .0 2024    CREATSERUM 0.62 2024    BUN 2 2024     2024    K 3.4 2024     2024    CO2 25.0 2024     2024    CA 7.7 2024    ALB 1.8 2024    ALKPHO 61 2024    BILT 0.4 2024    TP 5.0 2024    AST 22 2024    ALT 20 2024       Xray:  Reviewed    Problem List:  Patient Active Problem List   Diagnosis    Lumbar stenosis    Decreased strength of upper extremity    Plantar neuroma of left foot    Capsular contracture of breast implant    Depressive disorder    Hearing loss    Martinez's metatarsalgia    Bradycardia    Diarrhea of presumed infectious origin    Acute appendicitis with perforation, generalized peritonitis, and abscess, unspecified whether gangrene present    Dehydration    Hypokalemia    Hyponatremia            Impression:     72 y/o with acute perforated appendicitis with abscess s/p IR drain x 2  Cx: pseudomonas, e coli, strep  Tolerating full liquids    Plan:    Reviewed will start low fiber diet  PICC line placed per ID for IV antibiotics upon dc home  Encourage ambulation/IS  Reviewed she will need CT absessogram as outpatient likely left buttock drain to be removed in 1-2 weeks, RLQ drain likely stay until surgical management  All questions answered  Dc planning      MOO Solares  [facility]  General Surgery  4/23/2024

## 2024-04-24 VITALS
TEMPERATURE: 98 F | SYSTOLIC BLOOD PRESSURE: 97 MMHG | BODY MASS INDEX: 20 KG/M2 | OXYGEN SATURATION: 100 % | DIASTOLIC BLOOD PRESSURE: 62 MMHG | HEART RATE: 67 BPM | WEIGHT: 118 LBS | RESPIRATION RATE: 17 BRPM

## 2024-04-24 LAB
CRP SERPL-MCNC: 3.21 MG/DL (ref ?–0.3)
POTASSIUM SERPL-SCNC: 3.7 MMOL/L (ref 3.5–5.1)

## 2024-04-24 PROCEDURE — 84132 ASSAY OF SERUM POTASSIUM: CPT | Performed by: INTERNAL MEDICINE

## 2024-04-24 PROCEDURE — 86140 C-REACTIVE PROTEIN: CPT | Performed by: INTERNAL MEDICINE

## 2024-04-24 RX ORDER — POTASSIUM CHLORIDE 20 MEQ/1
40 TABLET, EXTENDED RELEASE ORAL ONCE
Status: COMPLETED | OUTPATIENT
Start: 2024-04-24 | End: 2024-04-24

## 2024-04-24 RX ORDER — MEROPENEM 1 G/1
1 INJECTION, POWDER, FOR SOLUTION INTRAVENOUS EVERY 8 HOURS
Qty: 60 G | Refills: 0 | Status: SHIPPED | OUTPATIENT
Start: 2024-04-24 | End: 2024-05-14

## 2024-04-24 NOTE — DISCHARGE INSTRUCTIONS
Sometimes managing your health at home requires assistance.  The [facility] team has recognized your preference to use [facility].  They can be reached by phone at [phone].  The fax number for your reference is [phone].  A representative from the home health agency will contact you or your family to schedule your first visit.

## 2024-04-24 NOTE — PROGRESS NOTES
Doing well    Drains in place  output clearing    Home on iv antibiotics    F/u cat scan next week    F/u in my office after cat scan

## 2024-04-24 NOTE — HOME CARE LIAISON
Received referral via [facility] for Home Health services. Spoke w/ patient at the bedside and is agreeable for home health care services. Patient daughter will help and assist with IV ABX. Spoke with Rhonda from [facility] and will deliver meds 4/24 with start of care with [facility] on 4/25 9/10am for teach and training  Updated AVS with contact information

## 2024-04-24 NOTE — PROGRESS NOTES
[facility]   part of [facility] Infectious Disease  Progress Note    Merissa Oshea Patient Status:  Inpatient    1950 MRN [id number]   Location [facility] 3NW-A Attending Shanice Flowers MD   Hosp Day # 5 PCP Cruz Puckett MD     Subjective:  Patient seen and examined in bedside chair. Reports feeling better today. Appetite improved and tolerating PO intake. Diarrhea also improved this morning. Continues to tolerate course of IV antibiotics. Drains intact. Otherwise no new complaints.     Objective:  Blood pressure 112/60, pulse 62, temperature 97.9 °F (36.6 °C), temperature source Oral, resp. rate 16, weight 118 lb (53.5 kg), SpO2 99%, not currently breastfeeding.    Intake/Output:    Intake/Output Summary (Last 24 hours) at 2024 0906  Last data filed at 2024 0824  Gross per 24 hour   Intake 1030 ml   Output 108 ml   Net 922 ml       Physical Exam:  General: Awake, alert, non-tox, NAD.  HEENT:  Oropharynx clear, trachea ML.  Heart: RRR S1S2 no murmurs.  Lungs: Essentially CTA b/l, no rhonchi, rales, wheezes.  Abdomen: Soft, NT/ND.  BS present.  No guarding or rebound. L buttock drain with serous output. RLQ drain with seropurulent output.   Extremity: No edema.  Neurological: No focal deficits.  Derm:  Warm and dry.    Lab Data Review:  Lab Results   Component Value Date    K 3.7 2024    CRP 3.21 2024        Cultures:  Hospital Encounter on 24   1. Anaerobic Culture     Status: None    Collection Time: 24 11:40 AM    Specimen: Subdiaphragmatic abscess   Result Value Ref Range    Anaerobic Culture 4+ growth Multiple anaerobes present N/A   2. Aerobic Bacterial Culture     Status: Abnormal    Collection Time: 24 11:40 AM    Specimen: Subdiaphragmatic abscess   Result Value Ref Range    Aerobic Culture Result 4+ growth Escherichia coli (A) N/A    Aerobic Culture Result 4+ growth Streptococcus anginosus (A) N/A    Aerobic Smear  4+ Gram Negative Rods N/A    Aerobic Smear 4+ Gram Positive Rods N/A    Aerobic Smear 4+ Gram positive cocci in pairs and clusters N/A    Aerobic Smear 1+ WBCs seen N/A       Susceptibility    Escherichia coli -  (no method available)     Ampicillin >=32 Resistant      Ampicillin + Sulbactam >=32 Resistant      Cefazolin <=4 Sensitive      Ciprofloxacin <=0.25 Sensitive      Gentamicin <=1 Sensitive      Meropenem <=0.25 Sensitive      Levofloxacin 1 Sensitive      Piperacillin + Tazobactam <=4 Sensitive      Trimethoprim/Sulfa <=20 Sensitive        Radiology:  CT DRAIN ABSCESS APPENDIX (CPT=49406)    Result Date: 4/22/2024  CONCLUSION:  Technically successful placement of percutaneous drains within abscesses located within the right lower quadrant and perirectal region.   LOCATION:  [facility]   Dictated by (CST): Leo Del Castillo MD on 4/22/2024 at 8:20 AM     Finalized by (CST): Leo Del Castillo MD on 4/22/2024 at 8:20 AM       CT DRAIN ABSCESS APPENDIX (CPT=49406)    Result Date: 4/22/2024  CONCLUSION:  Technically successful placement of percutaneous drains within abscesses located within the right lower quadrant and perirectal region.   LOCATION:  [facility]   Dictated by (CST): Leo Del Castillo MD on 4/22/2024 at 8:13 AM     Finalized by (CST): Leo Del Castillo MD on 4/22/2024 at 8:19 AM       CT ABDOMEN+PELVIS(CONTRAST ONLY)(CPT=74177)    Result Date: 4/19/2024  CONCLUSION:  1. There is wall thickening of the appendix with fluid.  There is irregularity of the distal appendix suggestive of perforation. 2. Large pelvic fluid collections measuring 10.7 x 4.9 x 10.9 cm and 7.9 x 5.2 x 6.7 cm suggestive of abscesses. 3. There is wall thickening of small bowel loops within the pelvis as well as the rectosigmoid portion of the colon which may be secondary to is secondary inflammation. 4. This critical value result was called to Dr. Del Rio on 4/19/2024 at 3:47 p.m..  Results read back was performed.    LOCATION:  [id number]   Dictated by (CST):  Dylan Louis MD on 4/19/2024 at 3:38 PM     Finalized by (CST): Dylan Louis MD on 4/19/2024 at 3:47 PM          Assessment and Plan:  1.  Acute perforated appendicitis in this patient who was admitted with abdominal pain and diarrhea  - No blood cultures found.  - CT with perforated appendix and large pelvic fluid collections measuring 10.7 x 4.9 x 10.9 cm and 7.9 x 5.2 x 6.7 cm suggestive of abscesses.  - S/p IR drain placement on 4/20.  - Cultures isolating Bacteroides thetaiotaomicron, Parabacteroides distasonis, Strep anginosus, E.coli and Pseudomonas aeruginosa.   - GI Stool Panel negative.  - C.diff PCR negative.  - General Surgery following -- no plans for surgical intervention at this time.  - IV Zosyn, ongoing.     2.  Leukocytosis secondary to the above (resolved)  - WBC remains normalized and stable at 6.4K <- 9K <- 13K.  - Afebrile.  - C.diff PCR negative.  - GI Stool Panel negative.  - Will trend.    3.  Recs  - Continue IV Zosyn at this time.  Pharmacy to dose. Plan to transition to IV meropenem 1 gram every 8 hours on discharge through 5/14/24 when ready.  Order and med rec.  - If [facility], please fax order to [phone], then call [phone] and select option #1 for scheduling.  - PICC line in place.  - F/u WBC and fever curve.  - Diet as per surgical team.  - Supportive care as per the primary team.  - Discussed discharge abx plan with SW/BENJA.   - Patient stable for discharge from an ID perspective pending clearance from other care teams.  - Please draw weekly CBC with diff, CMP and CRP for duration of abx therapy and fax results to [facility] at [phone].  - F/u with ID 1 week after discharge or sooner if necessary.   - Discussed plan of care with nursing.  - Discussed plan of care with patient.  All questions addressed understanding verbalized.  - Further recommendations pending clinical course.    Discussed case with ID attending/collaborating  physician, Dr. Jung Garcia, who is in agreement with the above plan of care.      Please note that this report has been produced using speech recognition software and may contain errors related to that system including, but not limited to, errors in grammar, punctuation, and spelling, as well as words and phrases that possibly may have been recognized inappropriately.  If there are any questions or concerns, contact the dictating provider for clarification.     The 21st Century Cures Act makes medical notes like these available to patients in the interest of transparency. Please be advised this is a medical document. Medical documents are intended to carry relevant information, facts as evident, and the clinical opinion of the practitioner. The medical note is intended as peer to peer communication and may appear blunt or direct. It is written in medical language and may contain abbreviations or verbiage that are unfamiliar.     If you have any questions or concerns please call [facility] Infectious Disease at [phone].     REJI Baron    4/24/2024  9:06 AM

## 2024-04-24 NOTE — PLAN OF CARE
Pt AOx4, VSS. Up ad jim, steady gait. Ambulates often and tolerates well. Pt on room air; denies RASHMI/SOB/lightheadedness. Pt reports pain is minimal at insertion sites of IR drains. Occasional PRN tylenol 500 mg for pain management + non-pharm methods such as distraction, ambulation, toileting schedule, reposition, rest & relaxation w/ lavender patches. SCDs continued at bed time. IV saline locked between ABX. PICC line flushed and blood return noted.  TYREL #1 (right groin/hip area) draining small amounts thick, tan, odorous drainage to bulb suction. This TYREL bulb flushed with 10 ml NS and aspirated 9 ml. TYREL #2 (left back/buttocks area) draining very minimal amounts reddish/brown drainage (thin today). This TYREL flushed 10 ml NS and aspirated 10 ml NS.  Pt tolerates flush & aspiration (q-shift) of IR TYREL drains well. Pt on soft diet, tolerates with no nausea, bowel sounds present x4, multiple formed BMs reported in the day. Pt would like information from the doctor on possible potassium replacements at home unril bowel movements decrease. Pt voids in adequate amounts, yellow/clear urine. Fall & safety precautions in place. POC discussed.

## 2024-04-24 NOTE — CM/SW NOTE
Department  notified of request for HHC, aidin referrals started. Assigned CM/SW to follow up with pt/family on further discharge planning.     Albania Cifuentes, DSC

## 2024-04-24 NOTE — DISCHARGE SUMMARY
[facility] Hospitalist Discharge Summary     Patient ID:  Merissa Oshea  73 year old  5/12/1950    Admit date: 4/19/2024    Discharge date and time: 04/24/24     Attending Physician: Shanice Flowers MD     Primary Care Physician: Cruz Puckett MD     Discharge Diagnoses: Diarrhea of presumed infectious origin [R19.7]  Dehydration [E86.0]  Hypokalemia [E87.6]  Hyponatremia [E87.1]  Acute appendicitis with perforation, generalized peritonitis, and abscess, unspecified whether gangrene present [K35.211]    Please note that only IHP DMG and EMG patients enrolled in the Medicare ACO, BCBS ACO and [facility] HMOs will be handled by the [facility] Care Management team.  For all other patients, please follow usual protocol for discharge care transition.    Discharge Condition: stable    Disposition:  home/C    Important Follow up:  - PCP within 2 weeks              Hospital Course:        73 year old female with no sig PMH presents today with c/o abd pain and diarrhea x 7 days. The pt states she has had low grade temps at home as well. Pain was at first waxing and waning but then a few days ago became constant. She is very active at baseline, and has been training to run a marathon in 1 m. In the ED, routine labs revealed a wbc 17.3, sodium 129, K 2.7, normal LFTs. CT a/p: showed perforated appendicitis with adjacent abscesses. IV zosyn was started in the ED. Surgery was consulted, pt was made NPO and admitted for further care and management.     Sepsis POA 2/2 perforated appendicitis  Intra abdominal abscesses 2/2 above s/p IR drain placement 4/20/24  -admit  -iv zosyn - drain cx with e.coli and streptococcus (now polymicrobial), ID consulted - plan to dc with 3 weeks of ertapenem, give a dose of meropenem prior to dc   -drains placed via IR - monitor output, drain management as OP per surgery, OP f/u for appendectomy planning   -surgery following >> apprec rec  -monitor  labs >> WBC downtrending  -supportiive care: ivf, pain meds, antiemetics  -resume diet /ADAT per surgery   -ID consulted >> apprec recs     Hyponatremia  Hypokalemia  -replace per protocol  -repeat labs show some improvement  -cont electrolyte protocol    Consults: IP CONSULT TO HOSPITALIST  IP CONSULT TO GENERAL SURGERY  IP CONSULT TO INFECTIOUS DISEASE  IP CONSULT TO VASCULAR ACCESS TEAM  IP CONSULT TO SOCIAL WORK    Operative Procedures:        Patient instructions:      I as the attending physician reconciled the current and discharge medications on day of discharge.     Current Discharge Medication List        START taking these medications    Details   meropenem 1 g Intravenous Recon Soln Inject 1 g into the vein every 8 (eight) hours for 20 days. PICC care per protocol. Please draw weekly CBC with diff, CMP and CRP for duration of abx therapy and fax results to [facility] ID at [phone]. Flushes per [facility] protocol.    If sending patient to [facility]fusion [facility], please fax order to [phone], then call [phone] and select option #1 for scheduling.           CONTINUE these medications which have NOT CHANGED    Details   Multiple Vitamins-Minerals (PRESERVISION AREDS) Oral Tab Take by mouth.             Activity: activity as tolerated  Diet: regular diet  Wound Care: as directed  Code Status: Full Code      Discharge Exam:     General: no acute distress, alert and oriented x 3  Heart: RRR  Lungs: clear bilaterally, no active wheezing  Abdomen: nontender, nondistended, intact BS  Extremities: no pedal edema   Neuro: CN inact, no focal deficits      Total time coordinating care for discharge: Greater than 30 minutes    Michael Martinez MD  [facility]

## 2024-04-24 NOTE — CM/SW NOTE
Pt's IV ABX was changed to IV meropenem q.8 hrs until 5/14.  Now that pt needs her IV ABX every 8 hrs, she wants to go home and have HH assist her with the IV ABX.  IV ABX order and supporting documentation sent to [facility] infusion [facility].  HH referrals also sent.  Waiting for [facility] to price home IV meropenem.

## 2024-04-24 NOTE — PROGRESS NOTES
0840: Patient A&Ox4. Vital signs stable on room air. Pain at IR drain incision sites 2/10; tylenol given. 1/10 upon reassessment. Abdomen soft, nondistended, tender. Bowel sounds present active with moderate loose stool. Denies nausea. Right anterior hip IR drain with moderate amount of yellow/tan thick output to bulb suction. Left buttock IR drain with scant amount of serous output to bulb suction. Regular diet being tolerated well. Patient updated on plan of care. Questions and concerns discussed.     1830: Discharge instructions provided, questions and concerns discussed. Drain care reviewed. Patient collected all personal belongings. Patient was brought to [facility] in wheelchair with RN and daughter.

## 2024-04-24 NOTE — CM/SW NOTE
Rhonda from [facility] called to say that the copay cost for the IV meropenem is $456.51/week for 3 weeks.  She has the option to do a interest free payment plan of $125/mo.  Talked with pt about the cost and she is agreeable to the total cost but would like to do the monthly payment plan.  Rhonda will contact pt to work out the payment plan.  Rhonda said the IV ABX can be delivered tonight to pt's home.  PT will get a dose of the IV meropenem before her dc tonight.      Gave pt HH agency list.  [facility] chooses [facility].  Ramya from [facility] aware of pt's choice.  She will talk with pt before her dc.  [facility] can then come tomorrow to assist pt with her first home IV ABX dose.  Pt said her dtr will come pick her tonight around 6pm.

## 2024-04-29 ENCOUNTER — LAB REQUISITION (OUTPATIENT)
Dept: LAB | Age: 74
End: 2024-04-29
Payer: MEDICARE

## 2024-04-29 DIAGNOSIS — E87.1 HYPO-OSMOLALITY AND HYPONATREMIA: ICD-10-CM

## 2024-04-29 DIAGNOSIS — K35.211 ACUTE APPENDICITIS WITH GENERALIZED PERITONITIS, WITH PERFORATION AND ABSCESS: ICD-10-CM

## 2024-04-29 LAB
ALBUMIN SERPL-MCNC: 3.5 G/DL (ref 3.2–4.8)
ALBUMIN/GLOB SERPL: 1.3 {RATIO} (ref 1–2)
ALP LIVER SERPL-CCNC: 66 U/L
ALT SERPL-CCNC: 20 U/L
ANION GAP SERPL CALC-SCNC: 9 MMOL/L (ref 0–18)
AST SERPL-CCNC: 24 U/L (ref ?–34)
BASOPHILS # BLD AUTO: 0.03 X10(3) UL (ref 0–0.2)
BASOPHILS NFR BLD AUTO: 0.7 %
BILIRUB SERPL-MCNC: 0.3 MG/DL (ref 0.2–1.1)
BUN BLD-MCNC: 14 MG/DL (ref 9–23)
BUN/CREAT SERPL: 21.9 (ref 10–20)
CALCIUM BLD-MCNC: 8.8 MG/DL (ref 8.7–10.4)
CHLORIDE SERPL-SCNC: 100 MMOL/L (ref 98–112)
CO2 SERPL-SCNC: 28 MMOL/L (ref 21–32)
CREAT BLD-MCNC: 0.64 MG/DL
CRP SERPL-MCNC: 0.4 MG/DL (ref ?–1)
DEPRECATED RDW RBC AUTO: 44.1 FL (ref 35.1–46.3)
EGFRCR SERPLBLD CKD-EPI 2021: 94 ML/MIN/1.73M2 (ref 60–?)
EOSINOPHIL # BLD AUTO: 0.05 X10(3) UL (ref 0–0.7)
EOSINOPHIL NFR BLD AUTO: 1.1 %
ERYTHROCYTE [DISTWIDTH] IN BLOOD BY AUTOMATED COUNT: 13.3 % (ref 11–15)
GLOBULIN PLAS-MCNC: 2.7 G/DL (ref 2.8–4.4)
GLUCOSE BLD-MCNC: 80 MG/DL (ref 70–99)
HCT VFR BLD AUTO: 35.5 %
HGB BLD-MCNC: 11.5 G/DL
IMM GRANULOCYTES # BLD AUTO: 0.02 X10(3) UL (ref 0–1)
IMM GRANULOCYTES NFR BLD: 0.5 %
LYMPHOCYTES # BLD AUTO: 1.53 X10(3) UL (ref 1–4)
LYMPHOCYTES NFR BLD AUTO: 35.2 %
MCH RBC QN AUTO: 29 PG (ref 26–34)
MCHC RBC AUTO-ENTMCNC: 32.4 G/DL (ref 31–37)
MCV RBC AUTO: 89.6 FL
MONOCYTES # BLD AUTO: 0.44 X10(3) UL (ref 0.1–1)
MONOCYTES NFR BLD AUTO: 10.1 %
NEUTROPHILS # BLD AUTO: 2.28 X10 (3) UL (ref 1.5–7.7)
NEUTROPHILS # BLD AUTO: 2.28 X10(3) UL (ref 1.5–7.7)
NEUTROPHILS NFR BLD AUTO: 52.4 %
OSMOLALITY SERPL CALC.SUM OF ELEC: 283 MOSM/KG (ref 275–295)
PLATELET # BLD AUTO: 428 10(3)UL (ref 150–450)
POTASSIUM SERPL-SCNC: 4 MMOL/L (ref 3.5–5.1)
PROT SERPL-MCNC: 6.2 G/DL (ref 5.7–8.2)
RBC # BLD AUTO: 3.96 X10(6)UL
SODIUM SERPL-SCNC: 137 MMOL/L (ref 136–145)
WBC # BLD AUTO: 4.4 X10(3) UL (ref 4–11)

## 2024-04-29 PROCEDURE — 85025 COMPLETE CBC W/AUTO DIFF WBC: CPT | Performed by: INTERNAL MEDICINE

## 2024-04-29 PROCEDURE — 80053 COMPREHEN METABOLIC PANEL: CPT | Performed by: INTERNAL MEDICINE

## 2024-04-29 PROCEDURE — 86140 C-REACTIVE PROTEIN: CPT | Performed by: INTERNAL MEDICINE

## 2024-05-06 ENCOUNTER — LAB REQUISITION (OUTPATIENT)
Dept: LAB | Age: 74
End: 2024-05-06
Payer: MEDICARE

## 2024-05-06 DIAGNOSIS — K35.211 ACUTE APPENDICITIS WITH GENERALIZED PERITONITIS, WITH PERFORATION AND ABSCESS: ICD-10-CM

## 2024-05-06 LAB
ALBUMIN SERPL-MCNC: 3.7 G/DL (ref 3.2–4.8)
ALBUMIN/GLOB SERPL: 1.4 {RATIO} (ref 1–2)
ALP LIVER SERPL-CCNC: 68 U/L
ALT SERPL-CCNC: 13 U/L
ANION GAP SERPL CALC-SCNC: 6 MMOL/L (ref 0–18)
AST SERPL-CCNC: 23 U/L (ref ?–34)
BASOPHILS # BLD AUTO: 0.05 X10(3) UL (ref 0–0.2)
BASOPHILS NFR BLD AUTO: 1.2 %
BILIRUB SERPL-MCNC: 0.4 MG/DL (ref 0.2–1.1)
BUN BLD-MCNC: 16 MG/DL (ref 9–23)
BUN/CREAT SERPL: 24.2 (ref 10–20)
CALCIUM BLD-MCNC: 8.7 MG/DL (ref 8.7–10.4)
CHLORIDE SERPL-SCNC: 103 MMOL/L (ref 98–112)
CO2 SERPL-SCNC: 30 MMOL/L (ref 21–32)
CREAT BLD-MCNC: 0.66 MG/DL
CRP SERPL-MCNC: <0.4 MG/DL (ref ?–1)
DEPRECATED RDW RBC AUTO: 43.9 FL (ref 35.1–46.3)
EGFRCR SERPLBLD CKD-EPI 2021: 94 ML/MIN/1.73M2 (ref 60–?)
EOSINOPHIL # BLD AUTO: 0.08 X10(3) UL (ref 0–0.7)
EOSINOPHIL NFR BLD AUTO: 2 %
ERYTHROCYTE [DISTWIDTH] IN BLOOD BY AUTOMATED COUNT: 13.4 % (ref 11–15)
FASTING STATUS PATIENT QL REPORTED: NO
GLOBULIN PLAS-MCNC: 2.6 G/DL (ref 2–3.5)
GLUCOSE BLD-MCNC: 84 MG/DL (ref 70–99)
HCT VFR BLD AUTO: 34.8 %
HGB BLD-MCNC: 11.5 G/DL
IMM GRANULOCYTES # BLD AUTO: 0.01 X10(3) UL (ref 0–1)
IMM GRANULOCYTES NFR BLD: 0.2 %
LYMPHOCYTES # BLD AUTO: 1.02 X10(3) UL (ref 1–4)
LYMPHOCYTES NFR BLD AUTO: 24.9 %
MCH RBC QN AUTO: 29.4 PG (ref 26–34)
MCHC RBC AUTO-ENTMCNC: 33 G/DL (ref 31–37)
MCV RBC AUTO: 89 FL
MONOCYTES # BLD AUTO: 0.45 X10(3) UL (ref 0.1–1)
MONOCYTES NFR BLD AUTO: 11 %
NEUTROPHILS # BLD AUTO: 2.49 X10 (3) UL (ref 1.5–7.7)
NEUTROPHILS # BLD AUTO: 2.49 X10(3) UL (ref 1.5–7.7)
NEUTROPHILS NFR BLD AUTO: 60.7 %
OSMOLALITY SERPL CALC.SUM OF ELEC: 288 MOSM/KG (ref 275–295)
PLATELET # BLD AUTO: 262 10(3)UL (ref 150–450)
POTASSIUM SERPL-SCNC: 3.8 MMOL/L (ref 3.5–5.1)
PROT SERPL-MCNC: 6.3 G/DL (ref 5.7–8.2)
RBC # BLD AUTO: 3.91 X10(6)UL
SODIUM SERPL-SCNC: 139 MMOL/L (ref 136–145)
WBC # BLD AUTO: 4.1 X10(3) UL (ref 4–11)

## 2024-05-06 PROCEDURE — 86140 C-REACTIVE PROTEIN: CPT | Performed by: INTERNAL MEDICINE

## 2024-05-06 PROCEDURE — 80053 COMPREHEN METABOLIC PANEL: CPT | Performed by: INTERNAL MEDICINE

## 2024-05-06 PROCEDURE — 85025 COMPLETE CBC W/AUTO DIFF WBC: CPT | Performed by: INTERNAL MEDICINE

## 2024-05-08 ENCOUNTER — HOSPITAL ENCOUNTER (OUTPATIENT)
Dept: CT IMAGING | Facility: HOSPITAL | Age: 74
Discharge: HOME | End: 2024-05-08
Attending: PHYSICIAN ASSISTANT
Payer: MEDICARE

## 2024-05-08 DIAGNOSIS — K35.211 ACUTE APPENDICITIS WITH PERFORATION, GENERALIZED PERITONITIS, AND ABSCESS, UNSPECIFIED WHETHER GANGRENE PRESENT: ICD-10-CM

## 2024-05-08 PROCEDURE — 49424 ASSESS CYST CONTRAST INJECT: CPT | Performed by: PHYSICIAN ASSISTANT

## 2024-05-08 PROCEDURE — 76080 X-RAY EXAM OF FISTULA: CPT | Performed by: PHYSICIAN ASSISTANT

## 2024-05-08 NOTE — H&P (VIEW-ONLY)
2024    No chief complaint on file.      HPI:    Merissa Oshea is a 73 year old female who was admitted to the hospital with a perforated appendix and two pelvic abscesses    She is s/p IR drainage of both sites    Minimal output from posterior drain    Overall doing well               Past Medical History:   Diagnosis Date   • Mitral valve prolapse    • Strep throat    • Visual impairment     reading glasses       Past Surgical History:   Procedure Laterality Date   • ARTHROSCOPY OF JOINT UNLISTED Right 2018   •      • COLONOSCOPY,DIAGNOSTIC      Dr. Seo   • COLONOSCOPY,DIAGNOSTIC  2018    hepatic flexure polyp   • EGD N/A 2018    Procedure: ESOPHAGOGASTRODUODENOSCOPY, COLONOSCOPY, POSSIBLE BIOPSY, POSSIBLE POLYPECTOMY 66998, 76342;  Surgeon: Yan Quiñones MD;  Location: [facility]   • ENLARGE BREAST WITH IMPLANT Bilateral 2001   • IMPLANT LEFT      REMOVED 2021   • IMPLANT RIGHT      REMOVED 2021   • OTHER SURGICAL HISTORY      abdominoplasty   • REV LOWER EYELID EXTEN FAT PAD  10/14/2008    Performed by LISA FERRARA at [facility]   • REV LOWER EYELID EXTEN FAT PAD  10/14/2008    Performed by LISA FERRARA at [facility]   • REV UPPER EYELID W EXCESS SKIN  10/14/2008    Performed by LISA FERRARA at [facility]   • REV UPPER EYELID W EXCESS SKIN  10/14/2008    Performed by LISA FERRARA at [facility]   • SHOULDER ARTHROSCOPY Right 2019    manipulation   • UPPER GI ENDOSCOPY,DIAGNOSIS  2018    hiatal hernia, gastric and SB Bx taken       Current Outpatient Medications   Medication Sig Dispense Refill   • metRONIDAZOLE (FLAGYL) 500 MG Oral Tab Take one tablet PO at 5pm, 7pm and 9pm the day before surgery 3 tablet 0   • neomycin 500 MG Oral Tab Take 2 tablets po at 5pm, 7pm and 9pm the day before surgery. 6 tablet 0       No Known Allergies    Family History   Problem  Relation Age of Onset   • Cancer Mother         lung/brain/liver   • Other (Other) Other         self-MVP   • Heart Disorder Father    • Hypertension Neg    • Lipids Neg    • Obesity Neg    • Psychiatric Neg        Social History    Tobacco Use      Smoking status: Never      Smokeless tobacco: Never    Vaping Use      Vaping Use: Never used    Alcohol use: Yes      Comment: socially    Drug use: No      ROS:    10 point review performed with pertinent positives and negatives per HPI    EXAM:    GENERAL: well developed, well nourished female, in no apparent distress  SKIN: anicteric  HEENT: normocephalic; sclera anicteric  NECK: supple, no JVD  RESPIRATORY: clear to auscultation  CARDIOVASCULAR: RRR  ABDOMEN: normal active BS, soft and no tenderness, no mass, drain right lower quadrant with some purulent material  , posterior drain empty  LYMPHATIC: no lymphadenopathy  EXTREMITIES: no cyanosis or edema    CT ABSCESS INJECTION VIA EXISTING CATH (CPT=76080/38579)    Result Date: 5/8/2024  PROCEDURE:  CT ABSCESS INJECTION VIA EXISTING CATH (CPT=76080/94712) COMPARISON:  [facility] , CT, CT DRAIN ABSCESS APPENDIX (CPT=49406), 4/20/2024, 10:49 AM. INDICATIONS:  K35.211 Acute appendicitis with perforation, generalized peritonitis, and abscess, unspecified whether gangrene present TECHNIQUE:  Witnessed verbal and written informed consent was obtained.  Time out was performed by the staff. A diluted contrast injection of abscess via existing drainage catheter was performed in the usual sterile manner with CT imaging. Dose reduction  techniques were used. Dose information is transmitted to the ACR (American College of Radiology) NRDR (National Radiology Data Registry) which includes the Dose Index Registry. PATIENT STATED HISTORY:(As transcribed by Technologist)  Patient is here to evaluate left buttock drain.    CONTRAST USED:  1cc of Isovue 370 FINDINGS:   CT through the pelvis in the area of the left gluteal drain was  performed pre and post injection of contrast material through the drainage catheter.  Pre contrast there is no significant residual cavity, the drainage catheter appears appropriately seated.  Post contrast injection demonstrates immediate return of contrast material around the catheter site.  There is no significant residual cavity following injection of 10 cc of diluted contrast material.  The catheter was cut and removed in its entirety.  Sterile dressing was applied.    CONCLUSION:  No significant residual abscess cavity or fistula in relation to the left gluteal drainage catheter.  The catheter was removed.        LOCATION:  [facility] Dictated by (CST): Marco Antonio Tran MD on 5/08/2024 at 1:58 PM     Finalized by (CST): Marco Antonio Tran MD on 5/08/2024 at 1:59 PM       CT DRAIN ABSCESS APPENDIX (CPT=49406)    Result Date: 4/22/2024  PROCEDURE:  CT DRAIN ABSCESS APPENDIX (CPT=49406) COMPARISON:  [facility] , CT, CT ABDOMEN+PELVIS(CONTRAST ONLY)(CPT=74177), 4/19/2024, 3:24 PM. INDICATIONS:  pelvic abscesses, pain DESCRIPTION OF PROCEDURE:  Informed consent was obtained.  The risks of the procedure, including but not limited to bleeding, infection, or organ/nerve injury were discussed with the patient who elected to proceed. An IV was checked and maintained by the  radiology nurse.  Preliminary computed tomography was performed.  A suitable skin entry site was marked.  The skin was then sterilely cleansed and draped in standard fashion.   Maximum sterile barrier technique was used for the procedure by all operators.  In accordance with CT protocols and the ALARA principle, radiation dose reduction techniques were utilized for this examination.  Limited computed tomography again demonstrated the fluid collections in the pelvis.  The preliminary scan is otherwise unchanged from prior exams.  After the administration of local anesthesia, a 18g Verma needle was advanced under CT guidance into pelvic fluid collection using a  right lower quadrant approach. Through this access, an Amplatz wire was passed into the collection and serial dilation was performed up to a 10 Lithuanian self-retaining pigtail catheter. The pigtail was string fixed within the cavity.  CT completion images confirmed successful placement of the percutaneous drain.  The catheter was secured to the skin with nonabsorbable suture and a sterile dressing.  Aspiration of 20 cc cloudy fluid was performed and sent for testing. Attention was then directed to the additional fluid collection within the perirectal region.  Under CT guidance an 18 gauge Verma needle was advanced into this collection.  An Amplatz wire was then passed into the collection and serial dilation was performed up to a 10 Lithuanian self-retaining pigtail catheter.  Approximately 20 cc of cloudy fluid was aspirated and sent for testing. Both catheters were secured to the skin with non absorbable suture and a sterile dressing.  Both pigtails were locked.  Both catheters were attached to three-way stopcock to facilitate drainage and flushing. The patient tolerated the procedure well.  There were no immediate procedure-related complications.   The patient was assessed and IV was established or checked and maintained by a radiology nurse.  Moderate sedation was given.  4.5 mg Versed and 150 mcg of Fentanyl was administered for the procedure with the RN monitoring the oxygen, heart rate and blood pressure under my direct face to face supervision for 49 minutes of conscious sedation.        CONCLUSION:  Technically successful placement of percutaneous drains within abscesses located within the right lower quadrant and perirectal region. LOCATION:  [facility] Dictated by (CST): Leo Del Castillo MD on 4/22/2024 at 8:20 AM     Finalized by (CST): Leo Del Castillo MD on 4/22/2024 at 8:20 AM       CT DRAIN ABSCESS APPENDIX (CPT=49406)    Result Date: 4/22/2024  PROCEDURE:  CT DRAIN ABSCESS APPENDIX (CPT=49406) COMPARISON:  [facility]  CT ABDOMEN+PELVIS(CONTRAST ONLY)(CPT=74177), 4/19/2024, 3:24 PM. INDICATIONS:  pelvic abscesses DESCRIPTION OF PROCEDURE:  Informed consent was obtained.  The risks of the procedure, including but not limited to bleeding, infection, or organ/nerve injury were discussed with the patient who elected to proceed. An IV was checked and maintained by the  radiology nurse.  Preliminary computed tomography was performed.  A suitable skin entry site was marked.  The skin was then sterilely cleansed and draped in standard fashion.   Maximum sterile barrier technique was used for the procedure by all operators.  In accordance with CT protocols and the ALARA principle, radiation dose reduction techniques were utilized for this examination.  Limited computed tomography again demonstrated the fluid collections pelvis.  The preliminary scan is otherwise unchanged from prior exams.  After the administration of local anesthesia, a 18g Verma needle was advanced under CT guidance into pelvic fluid collection using a right lower quadrant approach. Through this access, an Amplatz wire was passed into the collection and serial dilation was performed up to a 10 Salvadorean self-retaining pigtail catheter. The pigtail was string fixed within the cavity.  CT completion images confirmed successful placement of the percutaneous drain.  The catheter was secured to the skin with nonabsorbable suture and a sterile dressing.  Aspiration of 20 cc cloudy fluid was performed and sent for testing. Attention was then directed to the additional fluid collection within the perirectal region.  Under CT guidance an 18 gauge Verma needle was advanced into this collection.  An Amplatz wire was then passed into the collection and serial dilation was performed up to a 10 Salvadorean self-retaining pigtail catheter.  Approximately 20 cc of cloudy fluid was aspirated and sent for testing. Both catheters were secured to the skin with non absorbable suture and a  sterile dressing.  Both pigtails were locked.  Both catheters were attached to three-way stopcock to facilitate drainage and flushing. The patient tolerated the procedure well.  There were no immediate procedure-related complications.   The patient was assessed and IV was established or checked and maintained by a radiology nurse.  Moderate sedation was given.  4.5 mg Versed and 150 mcg of Fentanyl was administered for the procedure with the RN monitoring the oxygen, heart rate and blood pressure under my direct face to face supervision for 49 minutes of conscious sedation.        CONCLUSION:  Technically successful placement of percutaneous drains within abscesses located within the right lower quadrant and perirectal region. LOCATION:  [facility] Dictated by (CST): Leo Del Castillo MD on 4/22/2024 at 8:13 AM     Finalized by (CST): Leo Del Castillo MD on 4/22/2024 at 8:19 AM       CT ABDOMEN+PELVIS(CONTRAST ONLY)(CPT=74177)    Result Date: 4/19/2024  PROCEDURE:  CT ABDOMEN+PELVIS (CONTRAST ONLY) (CPT=74177) COMPARISON:  None. INDICATIONS:  abd pain for 7 days TECHNIQUE:  CT scanning was performed from the dome of the diaphragm to the pubic symphysis with non-ionic intravenous contrast material. Post contrast coronal MPR imaging was performed.  Dose reduction techniques were used. Dose information is transmitted to the ACR (American College of Radiology) NRDR (National Radiology Data Registry) which includes the Dose Index Registry. PATIENT STATED HISTORY:(As transcribed by Technologist)  Patient is here with LLQ and RLQ pain with diarrhea since 7 days    CONTRAST USED:  75cc of Isovue 370 FINDINGS:   LUNG BASES:  Minimal dependent atelectasis bilaterally. LIVER:  Normal in shape and contour.  Hepatic cyst. BILIARY:  Gallbladder is unremarkable in appearance.  No intrahepatic biliary dilatation.. SPLEEN:  Normal.  No enlargement or focal lesion. PANCREAS:  No malaika-pancreatic inflammatory stranding ADRENALS:  Normal.  No mass or  enlargement.   KIDNEYS:  Kidneys are symmetrical in size without evidence of hydronephrosis. BOWEL/MESENTERY:  There is small bowel dilatation.  There is wall thickening of small bowel loops within the pelvis.  There is also mild wall thickening of the rectosigmoid portion of the colon.  There is wall thickening of the appendix irregularity along the tip.  There is appendicoliths within the appendix. PELVIS:  Within the right upper pelvis there is a large complex abscess with air-fluid level measuring 10.7 x 4.9 x 10.9 cm.  Within the lower pelvis in the perirectal space there is a in 7.9 x 5.2 x 6.7 cm fluid collection. AORTA/VASCULAR:  Aorta is normal in caliber.  Atheromatous calcifications of the aorta. BONES:  Minimal anterolisthesis of L4 on L5.  Mild degenerative changes.        CONCLUSION:   1. There is wall thickening of the appendix with fluid.  There is irregularity of the distal appendix suggestive of perforation. 2. Large pelvic fluid collections measuring 10.7 x 4.9 x 10.9 cm and 7.9 x 5.2 x 6.7 cm suggestive of abscesses. 3. There is wall thickening of small bowel loops within the pelvis as well as the rectosigmoid portion of the colon which may be secondary to is secondary inflammation. 4. This critical value result was called to Dr. Del Rio on 4/19/2024 at 3:47 p.m..  Results read back was performed.   LOCATION:  [id number] Dictated by (CST): Dylan Louis MD on 4/19/2024 at 3:38 PM     Finalized by (CST): Dylan Louis MD on 4/19/2024 at 3:47 PM               IMPRESSION:    Perforated appendicitis with two pelvic abscesses  Doing well after IR drainage  Posterior abscess resolved  drain removed        PLAN:    Interval appendectomy  Tentatively scheduled for may 20th  Will leave anterior drain in place until surgery  Continue iv antibiotics until surgery  Will start with laparoscopic approach  They understand this maybe an open procedure with possible bowel resection  Details of surgery  reviewed

## 2024-05-13 ENCOUNTER — LAB REQUISITION (OUTPATIENT)
Dept: LAB | Age: 74
End: 2024-05-13

## 2024-05-13 DIAGNOSIS — K35.211 ACUTE APPENDICITIS WITH GENERALIZED PERITONITIS, WITH PERFORATION AND ABSCESS: ICD-10-CM

## 2024-05-13 LAB
ALBUMIN SERPL-MCNC: 4 G/DL (ref 3.2–4.8)
ALBUMIN/GLOB SERPL: 1.4 {RATIO} (ref 1–2)
ALP LIVER SERPL-CCNC: 73 U/L
ALT SERPL-CCNC: 9 U/L
ANION GAP SERPL CALC-SCNC: 6 MMOL/L (ref 0–18)
AST SERPL-CCNC: 22 U/L (ref ?–34)
BASOPHILS # BLD AUTO: 0.05 X10(3) UL (ref 0–0.2)
BASOPHILS NFR BLD AUTO: 1.6 %
BILIRUB SERPL-MCNC: 0.5 MG/DL (ref 0.2–1.1)
BUN BLD-MCNC: 17 MG/DL (ref 9–23)
BUN/CREAT SERPL: 22.7 (ref 10–20)
CALCIUM BLD-MCNC: 9.1 MG/DL (ref 8.7–10.4)
CHLORIDE SERPL-SCNC: 104 MMOL/L (ref 98–112)
CO2 SERPL-SCNC: 28 MMOL/L (ref 21–32)
CREAT BLD-MCNC: 0.75 MG/DL
CRP SERPL HS-MCNC: 2.4 MG/L (ref ?–3)
DEPRECATED RDW RBC AUTO: 43.1 FL (ref 35.1–46.3)
EGFRCR SERPLBLD CKD-EPI 2021: 85 ML/MIN/1.73M2 (ref 60–?)
EOSINOPHIL # BLD AUTO: 0.09 X10(3) UL (ref 0–0.7)
EOSINOPHIL NFR BLD AUTO: 2.9 %
ERYTHROCYTE [DISTWIDTH] IN BLOOD BY AUTOMATED COUNT: 13.2 % (ref 11–15)
FASTING STATUS PATIENT QL REPORTED: NO
GLOBULIN PLAS-MCNC: 2.8 G/DL (ref 2–3.5)
GLUCOSE BLD-MCNC: 91 MG/DL (ref 70–99)
HCT VFR BLD AUTO: 36.5 %
HGB BLD-MCNC: 12.3 G/DL
IMM GRANULOCYTES # BLD AUTO: 0 X10(3) UL (ref 0–1)
IMM GRANULOCYTES NFR BLD: 0 %
LYMPHOCYTES # BLD AUTO: 1.25 X10(3) UL (ref 1–4)
LYMPHOCYTES NFR BLD AUTO: 40.2 %
MCH RBC QN AUTO: 29.8 PG (ref 26–34)
MCHC RBC AUTO-ENTMCNC: 33.7 G/DL (ref 31–37)
MCV RBC AUTO: 88.4 FL
MONOCYTES # BLD AUTO: 0.38 X10(3) UL (ref 0.1–1)
MONOCYTES NFR BLD AUTO: 12.2 %
NEUTROPHILS # BLD AUTO: 1.34 X10 (3) UL (ref 1.5–7.7)
NEUTROPHILS # BLD AUTO: 1.34 X10(3) UL (ref 1.5–7.7)
NEUTROPHILS NFR BLD AUTO: 43.1 %
OSMOLALITY SERPL CALC.SUM OF ELEC: 287 MOSM/KG (ref 275–295)
PLATELET # BLD AUTO: 235 10(3)UL (ref 150–450)
POTASSIUM SERPL-SCNC: 4 MMOL/L (ref 3.5–5.1)
PROT SERPL-MCNC: 6.8 G/DL (ref 5.7–8.2)
RBC # BLD AUTO: 4.13 X10(6)UL
SODIUM SERPL-SCNC: 138 MMOL/L (ref 136–145)
WBC # BLD AUTO: 3.1 X10(3) UL (ref 4–11)

## 2024-05-13 PROCEDURE — 86141 C-REACTIVE PROTEIN HS: CPT | Performed by: INTERNAL MEDICINE

## 2024-05-13 PROCEDURE — 80053 COMPREHEN METABOLIC PANEL: CPT | Performed by: INTERNAL MEDICINE

## 2024-05-13 PROCEDURE — 85025 COMPLETE CBC W/AUTO DIFF WBC: CPT | Performed by: INTERNAL MEDICINE

## 2024-05-19 ENCOUNTER — ANESTHESIA EVENT (OUTPATIENT)
Dept: SURGERY | Facility: HOSPITAL | Age: 74
DRG: 329 | End: 2024-05-19
Payer: MEDICARE

## 2024-05-19 NOTE — ANESTHESIA PREPROCEDURE EVALUATION
----- Message from Daniela Obrien PA-C sent at 5/31/2023 12:23 PM CDT -----  Please enter new preauth botox order and urine orders, both under Dr. butcher.  There is nothing in there.  Patient is scheduled for next month in [place].    Thanks!     PRE-OP EVALUATION    Patient Name: Merissa Oshea    Admit Diagnosis: PERFORATED APPENDICITIS WITH ABSCESS    Pre-op Diagnosis: PERFORATED APPENDICITIS WITH ABSCESS    LAPAROSCOPIC APPENDECTOMY, POSSIBLE OPEN    Anesthesia Procedure: LAPAROSCOPIC APPENDECTOMY, POSSIBLE OPEN (Abdomen)    Surgeons and Role:     * Jovanni Monsivais MD - Primary    Pre-op vitals reviewed.        Body mass index is 18.88 kg/m².    Current medications reviewed.  Hospital Medications:  No current facility-administered medications on file as of .       Outpatient Medications:     No medications prior to admission.       Allergies: Patient has no known allergies.      Anesthesia Evaluation    Patient summary reviewed.    Anesthetic Complications  (-) history of anesthetic complications         GI/Hepatic/Renal                                 Cardiovascular                                                       Endo/Other                                  Pulmonary                           Neuro/Psych      (+) depression           (+) neuromuscular disease             Acute appendicitis with perforation, generalized peritonitis, and abscess, unspecified whether gangrene present Bradycardia  Capsular contracture of breast implant Decreased strength of upper extremity  Dehydration Depressive disorder  Diarrhea of presumed infectious origin Hearing loss  Hypokalemia Hyponatremia  Lumbar stenosis Martinez's metatarsalgia  Plantar neuroma of left foot             Past Surgical History:   Procedure Laterality Date    Arthroscopy of joint unlisted Right 2018          Colonoscopy,diagnostic      Dr. Seo    Colonoscopy,diagnostic  2018    hepatic flexure polyp    Egd N/A 2018    Procedure: ESOPHAGOGASTRODUODENOSCOPY, COLONOSCOPY, POSSIBLE BIOPSY, POSSIBLE POLYPECTOMY 70178, 45854;  Surgeon: Yan Quiñones MD;  Location: [facility] [place], [facility]    Enlarge breast with implant Bilateral 2001    Implant left   2001    Implant right  2001    Other surgical history      abdominoplasty    Other surgical history      explant of breast implants    Other surgical history Left     neuroma removal of foot    Rev lower eyelid exten fat pad  10/14/2008    Performed by LISA FERRARA at [facility]    Rev lower eyelid exten fat pad  10/14/2008    Performed by LISA FERRARA at [facility]    Rev upper eyelid w excess skin  10/14/2008    Performed by LISA FERRARA at [facility]    Rev upper eyelid w excess skin  10/14/2008    Performed by LISA FERRARA at [facility]    Shoulder arthroscopy Right 06/2019    manipulation    Upper gi endoscopy,diagnosis  02/28/2018    hiatal hernia, gastric and SB Bx taken     Social History     Socioeconomic History    Marital status:    Tobacco Use    Smoking status: Never    Smokeless tobacco: Never   Vaping Use    Vaping status: Never Used   Substance and Sexual Activity    Alcohol use: Yes     Comment: socially    Drug use: No    Sexual activity: Not Currently     Partners: Male     History   Drug Use No     Available pre-op labs reviewed.  Lab Results   Component Value Date    WBC 3.1 (L) 05/13/2024    RBC 4.13 05/13/2024    HGB 12.3 05/13/2024    HCT 36.5 05/13/2024    MCV 88.4 05/13/2024    MCH 29.8 05/13/2024    MCHC 33.7 05/13/2024    RDW 13.2 05/13/2024    .0 05/13/2024     Lab Results   Component Value Date     05/13/2024    K 4.0 05/13/2024     05/13/2024    CO2 28.0 05/13/2024    BUN 17 05/13/2024    CREATSERUM 0.75 05/13/2024    GLU 91 05/13/2024    CA 9.1 05/13/2024            Airway      Mallampati: I  Mouth opening: >3 FB  TM distance: > 6 cm  Neck ROM: full Cardiovascular    Cardiovascular exam normal.         Dental    Dentition appears grossly intact         Pulmonary    Pulmonary exam normal.                 Other findings              ASA: 3   Plan: general  NPO status verified and     Post-procedure  pain management plan discussed with surgeon and patient.      Plan/risks discussed with: patient                Present on Admission:  **None**

## 2024-05-20 ENCOUNTER — HOSPITAL ENCOUNTER (INPATIENT)
Facility: HOSPITAL | Age: 74
LOS: 7 days | Discharge: HOME HEALTH | DRG: 329 | End: 2024-05-28
Attending: SURGERY | Admitting: SURGERY
Payer: MEDICARE

## 2024-05-20 ENCOUNTER — ANESTHESIA (OUTPATIENT)
Dept: SURGERY | Facility: HOSPITAL | Age: 74
DRG: 329 | End: 2024-05-20
Payer: MEDICARE

## 2024-05-20 DIAGNOSIS — K63.1 BOWEL PERFORATION (HCC): ICD-10-CM

## 2024-05-20 DIAGNOSIS — G89.18 POSTOPERATIVE PAIN: Primary | ICD-10-CM

## 2024-05-20 PROCEDURE — 88304 TISSUE EXAM BY PATHOLOGIST: CPT | Performed by: SURGERY

## 2024-05-20 PROCEDURE — 0DN80ZZ RELEASE SMALL INTESTINE, OPEN APPROACH: ICD-10-PCS | Performed by: SURGERY

## 2024-05-20 PROCEDURE — 0DTJ0ZZ RESECTION OF APPENDIX, OPEN APPROACH: ICD-10-PCS | Performed by: SURGERY

## 2024-05-20 PROCEDURE — 76942 ECHO GUIDE FOR BIOPSY: CPT | Performed by: ANESTHESIOLOGY

## 2024-05-20 PROCEDURE — 3E0T3BZ INTRODUCTION OF ANESTHETIC AGENT INTO PERIPHERAL NERVES AND PLEXI, PERCUTANEOUS APPROACH: ICD-10-PCS | Performed by: ANESTHESIOLOGY

## 2024-05-20 PROCEDURE — 0DNU4ZZ RELEASE OMENTUM, PERCUTANEOUS ENDOSCOPIC APPROACH: ICD-10-PCS | Performed by: SURGERY

## 2024-05-20 PROCEDURE — 0WJG4ZZ INSPECTION OF PERITONEAL CAVITY, PERCUTANEOUS ENDOSCOPIC APPROACH: ICD-10-PCS | Performed by: SURGERY

## 2024-05-20 PROCEDURE — 0DNV0ZZ RELEASE MESENTERY, OPEN APPROACH: ICD-10-PCS | Performed by: SURGERY

## 2024-05-20 RX ORDER — EPHEDRINE SULFATE 50 MG/ML
INJECTION INTRAVENOUS AS NEEDED
Status: DISCONTINUED | OUTPATIENT
Start: 2024-05-20 | End: 2024-05-20 | Stop reason: SURG

## 2024-05-20 RX ORDER — ONDANSETRON 2 MG/ML
INJECTION INTRAMUSCULAR; INTRAVENOUS AS NEEDED
Status: DISCONTINUED | OUTPATIENT
Start: 2024-05-20 | End: 2024-05-20 | Stop reason: SURG

## 2024-05-20 RX ORDER — DEXTROSE, SODIUM CHLORIDE, SODIUM LACTATE, POTASSIUM CHLORIDE, AND CALCIUM CHLORIDE 5; .6; .31; .03; .02 G/100ML; G/100ML; G/100ML; G/100ML; G/100ML
INJECTION, SOLUTION INTRAVENOUS CONTINUOUS
Status: DISCONTINUED | OUTPATIENT
Start: 2024-05-20 | End: 2024-05-22

## 2024-05-20 RX ORDER — KETOROLAC TROMETHAMINE 30 MG/ML
INJECTION, SOLUTION INTRAMUSCULAR; INTRAVENOUS AS NEEDED
Status: DISCONTINUED | OUTPATIENT
Start: 2024-05-20 | End: 2024-05-20 | Stop reason: SURG

## 2024-05-20 RX ORDER — ACETAMINOPHEN 500 MG
1000 TABLET ORAL ONCE
Status: DISCONTINUED | OUTPATIENT
Start: 2024-05-20 | End: 2024-05-20 | Stop reason: HOSPADM

## 2024-05-20 RX ORDER — DEXAMETHASONE SODIUM PHOSPHATE 4 MG/ML
VIAL (ML) INJECTION AS NEEDED
Status: DISCONTINUED | OUTPATIENT
Start: 2024-05-20 | End: 2024-05-20 | Stop reason: SURG

## 2024-05-20 RX ORDER — HYDROMORPHONE HYDROCHLORIDE 1 MG/ML
0.4 INJECTION, SOLUTION INTRAMUSCULAR; INTRAVENOUS; SUBCUTANEOUS EVERY 2 HOUR PRN
Status: DISCONTINUED | OUTPATIENT
Start: 2024-05-20 | End: 2024-05-23

## 2024-05-20 RX ORDER — CEFAZOLIN SODIUM 1 G/3ML
INJECTION, POWDER, FOR SOLUTION INTRAMUSCULAR; INTRAVENOUS AS NEEDED
Status: DISCONTINUED | OUTPATIENT
Start: 2024-05-20 | End: 2024-05-20 | Stop reason: SURG

## 2024-05-20 RX ORDER — NALOXONE HYDROCHLORIDE 0.4 MG/ML
0.08 INJECTION, SOLUTION INTRAMUSCULAR; INTRAVENOUS; SUBCUTANEOUS AS NEEDED
Status: DISCONTINUED | OUTPATIENT
Start: 2024-05-20 | End: 2024-05-20 | Stop reason: HOSPADM

## 2024-05-20 RX ORDER — OXYCODONE HYDROCHLORIDE 5 MG/1
10 TABLET ORAL EVERY 4 HOURS PRN
Status: DISCONTINUED | OUTPATIENT
Start: 2024-05-20 | End: 2024-05-20 | Stop reason: HOSPADM

## 2024-05-20 RX ORDER — OXYCODONE HYDROCHLORIDE 5 MG/1
2.5 TABLET ORAL EVERY 4 HOURS PRN
Status: DISCONTINUED | OUTPATIENT
Start: 2024-05-20 | End: 2024-05-23

## 2024-05-20 RX ORDER — ACETAMINOPHEN 10 MG/ML
INJECTION, SOLUTION INTRAVENOUS AS NEEDED
Status: DISCONTINUED | OUTPATIENT
Start: 2024-05-20 | End: 2024-05-20 | Stop reason: SURG

## 2024-05-20 RX ORDER — ONDANSETRON 2 MG/ML
4 INJECTION INTRAMUSCULAR; INTRAVENOUS EVERY 6 HOURS PRN
Status: DISCONTINUED | OUTPATIENT
Start: 2024-05-20 | End: 2024-05-20 | Stop reason: HOSPADM

## 2024-05-20 RX ORDER — SODIUM CHLORIDE, SODIUM LACTATE, POTASSIUM CHLORIDE, CALCIUM CHLORIDE 600; 310; 30; 20 MG/100ML; MG/100ML; MG/100ML; MG/100ML
INJECTION, SOLUTION INTRAVENOUS CONTINUOUS
Status: DISCONTINUED | OUTPATIENT
Start: 2024-05-20 | End: 2024-05-20 | Stop reason: HOSPADM

## 2024-05-20 RX ORDER — ROCURONIUM BROMIDE 10 MG/ML
INJECTION, SOLUTION INTRAVENOUS AS NEEDED
Status: DISCONTINUED | OUTPATIENT
Start: 2024-05-20 | End: 2024-05-20 | Stop reason: SURG

## 2024-05-20 RX ORDER — BUPIVACAINE HYDROCHLORIDE 5 MG/ML
INJECTION, SOLUTION EPIDURAL; INTRACAUDAL AS NEEDED
Status: DISCONTINUED | OUTPATIENT
Start: 2024-05-20 | End: 2024-05-20 | Stop reason: HOSPADM

## 2024-05-20 RX ORDER — MIDAZOLAM HYDROCHLORIDE 1 MG/ML
1 INJECTION INTRAMUSCULAR; INTRAVENOUS EVERY 5 MIN PRN
Status: DISCONTINUED | OUTPATIENT
Start: 2024-05-20 | End: 2024-05-20 | Stop reason: HOSPADM

## 2024-05-20 RX ORDER — HYDROMORPHONE HYDROCHLORIDE 1 MG/ML
INJECTION, SOLUTION INTRAMUSCULAR; INTRAVENOUS; SUBCUTANEOUS
Status: COMPLETED
Start: 2024-05-20 | End: 2024-05-20

## 2024-05-20 RX ORDER — HYDROMORPHONE HYDROCHLORIDE 1 MG/ML
0.2 INJECTION, SOLUTION INTRAMUSCULAR; INTRAVENOUS; SUBCUTANEOUS EVERY 5 MIN PRN
Status: DISCONTINUED | OUTPATIENT
Start: 2024-05-20 | End: 2024-05-20 | Stop reason: HOSPADM

## 2024-05-20 RX ORDER — HYDROMORPHONE HYDROCHLORIDE 1 MG/ML
0.6 INJECTION, SOLUTION INTRAMUSCULAR; INTRAVENOUS; SUBCUTANEOUS EVERY 5 MIN PRN
Status: DISCONTINUED | OUTPATIENT
Start: 2024-05-20 | End: 2024-05-20 | Stop reason: HOSPADM

## 2024-05-20 RX ORDER — OXYCODONE HYDROCHLORIDE 5 MG/1
15 TABLET ORAL EVERY 4 HOURS PRN
Status: DISCONTINUED | OUTPATIENT
Start: 2024-05-20 | End: 2024-05-20 | Stop reason: HOSPADM

## 2024-05-20 RX ORDER — SODIUM CHLORIDE, SODIUM LACTATE, POTASSIUM CHLORIDE, CALCIUM CHLORIDE 600; 310; 30; 20 MG/100ML; MG/100ML; MG/100ML; MG/100ML
INJECTION, SOLUTION INTRAVENOUS CONTINUOUS
Status: DISCONTINUED | OUTPATIENT
Start: 2024-05-20 | End: 2024-05-22

## 2024-05-20 RX ORDER — HYDROMORPHONE HYDROCHLORIDE 1 MG/ML
0.2 INJECTION, SOLUTION INTRAMUSCULAR; INTRAVENOUS; SUBCUTANEOUS EVERY 2 HOUR PRN
Status: DISCONTINUED | OUTPATIENT
Start: 2024-05-20 | End: 2024-05-23

## 2024-05-20 RX ORDER — METRONIDAZOLE 500 MG/100ML
INJECTION, SOLUTION INTRAVENOUS AS NEEDED
Status: DISCONTINUED | OUTPATIENT
Start: 2024-05-20 | End: 2024-05-20 | Stop reason: SURG

## 2024-05-20 RX ORDER — NEOSTIGMINE METHYLSULFATE 1 MG/ML
INJECTION, SOLUTION INTRAVENOUS AS NEEDED
Status: DISCONTINUED | OUTPATIENT
Start: 2024-05-20 | End: 2024-05-20 | Stop reason: SURG

## 2024-05-20 RX ORDER — METOCLOPRAMIDE HYDROCHLORIDE 5 MG/ML
10 INJECTION INTRAMUSCULAR; INTRAVENOUS EVERY 8 HOURS PRN
Status: DISCONTINUED | OUTPATIENT
Start: 2024-05-20 | End: 2024-05-20 | Stop reason: HOSPADM

## 2024-05-20 RX ORDER — HYDROMORPHONE HYDROCHLORIDE 1 MG/ML
0.4 INJECTION, SOLUTION INTRAMUSCULAR; INTRAVENOUS; SUBCUTANEOUS EVERY 5 MIN PRN
Status: DISCONTINUED | OUTPATIENT
Start: 2024-05-20 | End: 2024-05-20 | Stop reason: HOSPADM

## 2024-05-20 RX ORDER — LIDOCAINE HYDROCHLORIDE AND EPINEPHRINE 10; 10 MG/ML; UG/ML
INJECTION, SOLUTION INFILTRATION; PERINEURAL AS NEEDED
Status: DISCONTINUED | OUTPATIENT
Start: 2024-05-20 | End: 2024-05-20 | Stop reason: HOSPADM

## 2024-05-20 RX ORDER — GLYCOPYRROLATE 0.2 MG/ML
INJECTION, SOLUTION INTRAMUSCULAR; INTRAVENOUS AS NEEDED
Status: DISCONTINUED | OUTPATIENT
Start: 2024-05-20 | End: 2024-05-20 | Stop reason: SURG

## 2024-05-20 RX ORDER — MEPERIDINE HYDROCHLORIDE 25 MG/ML
12.5 INJECTION INTRAMUSCULAR; INTRAVENOUS; SUBCUTANEOUS AS NEEDED
Status: DISCONTINUED | OUTPATIENT
Start: 2024-05-20 | End: 2024-05-20 | Stop reason: HOSPADM

## 2024-05-20 RX ORDER — ROPIVACAINE HYDROCHLORIDE 5 MG/ML
INJECTION, SOLUTION EPIDURAL; INFILTRATION; PERINEURAL AS NEEDED
Status: DISCONTINUED | OUTPATIENT
Start: 2024-05-20 | End: 2024-05-20 | Stop reason: SURG

## 2024-05-20 RX ORDER — OXYCODONE HYDROCHLORIDE 5 MG/1
5 TABLET ORAL EVERY 4 HOURS PRN
Status: DISCONTINUED | OUTPATIENT
Start: 2024-05-20 | End: 2024-05-20 | Stop reason: HOSPADM

## 2024-05-20 RX ORDER — DEXAMETHASONE SODIUM PHOSPHATE 10 MG/ML
INJECTION, SOLUTION INTRAMUSCULAR; INTRAVENOUS AS NEEDED
Status: DISCONTINUED | OUTPATIENT
Start: 2024-05-20 | End: 2024-05-20 | Stop reason: SURG

## 2024-05-20 RX ORDER — LIDOCAINE HYDROCHLORIDE ANHYDROUS AND DEXTROSE MONOHYDRATE .8; 5 G/100ML; G/100ML
INJECTION, SOLUTION INTRAVENOUS CONTINUOUS PRN
Status: DISCONTINUED | OUTPATIENT
Start: 2024-05-20 | End: 2024-05-20 | Stop reason: SURG

## 2024-05-20 RX ORDER — OXYCODONE HYDROCHLORIDE 10 MG/1
5 TABLET ORAL EVERY 4 HOURS PRN
Status: DISCONTINUED | OUTPATIENT
Start: 2024-05-20 | End: 2024-05-23

## 2024-05-20 RX ORDER — LIDOCAINE HYDROCHLORIDE 10 MG/ML
INJECTION, SOLUTION EPIDURAL; INFILTRATION; INTRACAUDAL; PERINEURAL AS NEEDED
Status: DISCONTINUED | OUTPATIENT
Start: 2024-05-20 | End: 2024-05-20 | Stop reason: SURG

## 2024-05-20 RX ADMIN — KETOROLAC TROMETHAMINE 30 MG: 30 INJECTION, SOLUTION INTRAMUSCULAR; INTRAVENOUS at 11:03:00

## 2024-05-20 RX ADMIN — ONDANSETRON 4 MG: 2 INJECTION INTRAMUSCULAR; INTRAVENOUS at 11:00:00

## 2024-05-20 RX ADMIN — LIDOCAINE HYDROCHLORIDE ANHYDROUS AND DEXTROSE MONOHYDRATE 2 MG/KG/HR: .8; 5 INJECTION, SOLUTION INTRAVENOUS at 10:21:00

## 2024-05-20 RX ADMIN — ACETAMINOPHEN 1000 MG: 10 INJECTION, SOLUTION INTRAVENOUS at 09:52:00

## 2024-05-20 RX ADMIN — DEXAMETHASONE SODIUM PHOSPHATE 4 MG: 10 INJECTION, SOLUTION INTRAMUSCULAR; INTRAVENOUS at 10:54:00

## 2024-05-20 RX ADMIN — DEXAMETHASONE SODIUM PHOSPHATE 8 MG: 4 MG/ML VIAL (ML) INJECTION at 09:52:00

## 2024-05-20 RX ADMIN — ROCURONIUM BROMIDE 10 MG: 10 INJECTION, SOLUTION INTRAVENOUS at 10:05:00

## 2024-05-20 RX ADMIN — ROPIVACAINE HYDROCHLORIDE 20 ML: 5 INJECTION, SOLUTION EPIDURAL; INFILTRATION; PERINEURAL at 10:54:00

## 2024-05-20 RX ADMIN — CEFAZOLIN SODIUM 2 G: 1 INJECTION, POWDER, FOR SOLUTION INTRAMUSCULAR; INTRAVENOUS at 09:20:00

## 2024-05-20 RX ADMIN — METRONIDAZOLE 500 MG: 500 INJECTION, SOLUTION INTRAVENOUS at 09:20:00

## 2024-05-20 RX ADMIN — GLYCOPYRROLATE 0.4 MG: 0.2 INJECTION, SOLUTION INTRAMUSCULAR; INTRAVENOUS at 10:52:00

## 2024-05-20 RX ADMIN — ROCURONIUM BROMIDE 30 MG: 10 INJECTION, SOLUTION INTRAVENOUS at 09:13:00

## 2024-05-20 RX ADMIN — SODIUM CHLORIDE, SODIUM LACTATE, POTASSIUM CHLORIDE, CALCIUM CHLORIDE: 600; 310; 30; 20 INJECTION, SOLUTION INTRAVENOUS at 09:11:00

## 2024-05-20 RX ADMIN — ROCURONIUM BROMIDE 10 MG: 10 INJECTION, SOLUTION INTRAVENOUS at 09:37:00

## 2024-05-20 RX ADMIN — LIDOCAINE HYDROCHLORIDE 50 MG: 10 INJECTION, SOLUTION EPIDURAL; INFILTRATION; INTRACAUDAL; PERINEURAL at 09:13:00

## 2024-05-20 RX ADMIN — EPHEDRINE SULFATE 10 MG: 50 INJECTION INTRAVENOUS at 10:42:00

## 2024-05-20 RX ADMIN — NEOSTIGMINE METHYLSULFATE 4 MG: 1 INJECTION, SOLUTION INTRAVENOUS at 10:52:00

## 2024-05-20 NOTE — BRIEF OP NOTE
Pre-Operative Diagnosis: PERFORATED APPENDICITIS WITH ABSCESS     Post-Operative Diagnosis: PERFORATED APPENDICITIS WITH ABSCESS      Procedure Performed:   ATTEMPTED LAPARASCOPIC APPENDECTOMY, TURNED OPEN APPENDECTOMY  Lysis of adhesions    Surgeons and Role:     * Jovanni Monsivais MD - Primary    Assistant(s):  PA: Taylor Santillan PA     Surgical Findings: extensive pelvic adhesions     Specimen: appendix     Estimated Blood Loss: Blood Output: 25 mL (5/20/2024 10:41 AM)          Jovanni Monsivais MD  5/20/2024  10:49 AM

## 2024-05-20 NOTE — OPERATIVE REPORT
[facility]    PATIENT'S NAME: ANNIE SAPP   ATTENDING PHYSICIAN: Jovanni Monsivais M.D.   OPERATING PHYSICIAN: Jovanni Monsivais M.D.   PATIENT ACCOUNT#:   [id number]    LOCATION:  [address] [place] [address]  MEDICAL RECORD #:   [id number]       YOB: 1950  ADMISSION DATE:       05/20/2024      OPERATION DATE:  05/20/2024    OPERATIVE REPORT      PREOPERATIVE DIAGNOSIS:  Perforated appendix with abscess.  POSTOPERATIVE DIAGNOSIS:  Perforated appendix with abscess and extensive intraabdominal adhesions.  PROCEDURE:  Diagnostic laparoscopy, open appendectomy, extensive lysis of adhesions.    ASSISTANT:  Taylor Santillan PA-C, assisted by prepping, draping, camera holding, and then retracting throughout the procedure.    ANESTHESIA:  General.     OPERATIVE TECHNIQUE:  Patient was brought in the operating room and placed on the operating table in supine position.  Inhalational anesthesia provided by attending anesthesiologist.  Abdomen was prepped in usual sterile fashion.  Lidocaine 1% and Marcaine 0.5% used as a local anesthetic.  Patient previously was admitted with a perforated appendix and abscess.  She underwent IR drainage of 2 sites, 1 in the pelvis and 1 behind the rectum.  The abscess around the rectum cleared and that drain was removed, and the drain in the right lower quadrant persists.  She is here today for interval appendectomy.  I began surgery laparoscopically and made a small skin incision just beneath the umbilicus, elevated the abdominal wall fascia with Kochers, opened it under direct vision, inserted a Tanner trocar, established a pneumoperitoneum and placed the camera.  I placed two 5 mm trocars under direct laparoscopic guidance.  On viewing the pelvis, there were extensive adhesions.  Initially, there was omentum adherent to the abdominal wall.  I was able to free the omentum off the abdominal wall under direct laparoscopic guidance with a LigaSure.  I was able to identify the  drain as it entered into the pelvic area and extensive inflammation regarding the small-bowel severely adherent to the drain as the drain coursed down into the pelvic area.  I identified the right colon and the appendix, which was plastered on the pelvic sidewall.  After working for awhile laparoscopically, realizing that I could not proceed further and there were 2 enterotomies as part of this difficult dissection, therefore, I opened through a low midline incision, identified the enterotomies.  I closed those with interrupted silk sutures.  I then proceeded to perform a complete open lysis of adhesions.  There was bowel to bowel, bowel to the mesentery, and essentially there were adhesions involving almost every inch of her small-bowel.  I inspected the small-bowel, did not see any other injuries.  The repairs appeared intact.  I was able to get the appendix up out of the pelvis.  I divided the mesoappendix with the LigaSure and the base of the appendix with a TA 30.  The appendix was sent for pathology, for histological examination.  Appeared to be pus coming from the tip of the appendix.  I irrigated the abdomen with Irrisept.  Hemostasis was good, and the bowel was returned to its normal anatomic position.  I placed a Fabrizio drain through 1 of my 5 mm trocar sites in the left side of the abdomen and laid it into the pelvic area.  We closed the abdomen with a looped PDS, the skin with a skin stapler.  Sterile dressing was provided.  The patient tolerated the procedure well.  She was taken to the recovery room for observation.    Dictated By Jovanni Monsivais M.D.  d: 05/20/2024 10:57:00  t: 05/20/2024 16:52:03  [facility] [id number]  [facility]/

## 2024-05-20 NOTE — INTERVAL H&P NOTE
Pre-op Diagnosis: PERFORATED APPENDICITIS WITH ABSCESS    The above referenced H&P was reviewed by Jovanni Monsivais MD on 5/20/2024, the patient was examined and no significant changes have occurred in the patient's condition since the H&P was performed.  I discussed with the patient and/or legal representative the potential benefits, risks and side effects of this procedure; the likelihood of the patient achieving goals; and potential problems that might occur during recuperation.  I discussed reasonable alternatives to the procedure, including risks, benefits and side effects related to the alternatives and risks related to not receiving this procedure.  We will proceed with procedure as planned.

## 2024-05-20 NOTE — PLAN OF CARE
Patient arrived from PACU @ 1330. AOx4. RA. Hemodynamically stable. NPO, IVF. Midline abd incision with aquacell. TYREL drain to bulb suction. Pike with adequate UOP.

## 2024-05-20 NOTE — ANESTHESIA PROCEDURE NOTES
Airway  Date/Time: 5/20/2024 9:16 AM  Urgency: elective      General Information and Staff    Patient location during procedure: OR  Anesthesiologist: Mark Peck MD  Resident/CRNA: Maddison Baptiste CRNA  Performed: CRNA   Performed by: Maddison Baptiste CRNA  Authorized by: Mark Peck MD      Indications and Patient Condition  Indications for airway management: anesthesia  Sedation level: deep  Preoxygenated: yes  Patient position: sniffing  Mask difficulty assessment: 1 - vent by mask    Final Airway Details  Final airway type: endotracheal airway      Successful airway: ETT  Cuffed: yes   Successful intubation technique: direct laryngoscopy  Facilitating devices/methods: intubating stylet  Endotracheal tube insertion site: oral  Blade: Wallace  Blade size: #3  ETT size (mm): 7.0    Cormack-Lehane Classification: grade I - full view of glottis  Placement verified by: capnometry   Measured from: lips  ETT to lips (cm): 21  Number of attempts at approach: 1

## 2024-05-20 NOTE — ANESTHESIA POSTPROCEDURE EVALUATION
[facility]    Merissa Oshea Patient Status:  Outpatient in a Bed   Age/Gender 74 year old female MRN [id number]   Location [facility] SURGERY Attending Jovanni Monsivais MD   Hosp Day # 0 PCP Cruz Puckett MD       Anesthesia Post-op Note    ATTEMPTED LAPARASCOPIC APPENDECTOMY, TURNED OPEN APPENDECTOMY    Procedure Summary       Date: 05/20/24 Room / Location:  MAIN OR [address] [place] [address] MAIN OR    Anesthesia Start: 0910 Anesthesia Stop: 1115    Procedure: ATTEMPTED LAPARASCOPIC APPENDECTOMY, TURNED OPEN APPENDECTOMY (Abdomen) Diagnosis: (PERFORATED APPENDICITIS WITH ABSCESS)    Surgeons: Jovanni Monsivais MD Anesthesiologist: Mark Peck MD    Anesthesia Type: general ASA Status: 3            Anesthesia Type: general    Vitals Value Taken Time   /65 05/20/24 1118   Temp 98 05/20/24 1118   Pulse 68 05/20/24 1118   Resp 12 05/20/24 1118   SpO2 100 05/20/24 1118       Patient Location: PACU    Anesthesia Type: general    Airway Patency: patent    Postop Pain Control: adequate    Mental Status: mildly sedated but able to meaningfully participate in the post-anesthesia evaluation    Nausea/Vomiting: none    Cardiopulmonary/Hydration status: stable euvolemic    Complications: no apparent anesthesia related complications    Postop vital signs: stable    Dental Exam: Unchanged from Preop    Patient to be discharged from PACU when criteria met.

## 2024-05-20 NOTE — ANESTHESIA PROCEDURE NOTES
Regional Block    Performed by: Maddison Baptiste CRNA  Authorized by: Mark Peck MD      General Information and Staff    Start Time:   Anesthesiologist:  Mark Peck MD  CRNA:  Maddison Baptiste CRNA  Performed by:  CRNA  Patient Location:  OR      Site Identification: real time ultrasound guided and image stored and retrievable    Block site/laterality marked before start: site marked  Reason for Block: at surgeon's request and post-op pain management    Preanesthetic Checklist: 2 patient identifers, IV checked, risks and benefits discussed, monitors and equipment checked, pre-op evaluation, timeout performed, anesthesia consent, sterile technique used, no prohibitive neurological deficits and no local skin infection at insertion site      Procedure Details    Patient Position:   Prep: ChloraPrep    Monitoring:  Cardiac monitor, continuous pulse ox and blood pressure cuff  Block Type:  TAP  Laterality:  Bilateral  Injection Technique:  Single-shot    Needle    Needle Type:  Short-bevel and echogenic  Needle Gauge:  21 G  Needle Length:  110 mm  Needle Localization:  Ultrasound guidance  Reason for Ultrasound Use: appropriate spread of the medication was noted in real time and no ultrasound evidence of intravascular and/or intraneural injection            Assessment    Injection Assessment:  Good spread noted, negative resistance, negative aspiration for heme, incremental injection and low pressure  Heart Rate Change: No    - Patient tolerated block procedure well without evidence of immediate block related complications.     Medications      Additional Comments    Medication:  Ropivacaine 0.25% 20mL with 2 mg PF decadron per side

## 2024-05-20 NOTE — CONSULTS
[facility]   part of [facility]    Medical Consult     Merissa Oshea Patient Status:  Outpatient in a Bed    1950 MRN [id number]   Location [facility] 3NW-A Attending Jovanni Monsivais MD   Hosp Day # 0 PCP Cruz Puckett MD     Chief Complaint: Acute appendicitis with perforation/abscess    History of Present Illness: Merissa Oshea is a 74 year old female with no significant past medical history presenting with planned surgical intervention for recent acute appendicitis with perforation/abscess.  Patient was admitted a few weeks ago with a diagnosis of acute appendicitis with abscess formation.  Patient had 2 drains placed and was placed on IV antibiotics and discharged home.  Patient eventually completed antibiotics and both drains were removed.  Patient was brought in for surgical intervention today.  Patient denies any preoperative positive review of systems.  Patient denies any acute issues currently.  Patient's pain is currently controlled.    Past Medical History:  Past Medical History:    COVID    Mitral valve prolapse    Strep throat    Visual impairment    reading glasses        Past Surgical History:   Past Surgical History:   Procedure Laterality Date    Arthroscopy of joint unlisted Right 2018          Colonoscopy,diagnostic      Dr. Seo    Colonoscopy,diagnostic  2018    hepatic flexure polyp    Egd N/A 2018    Procedure: ESOPHAGOGASTRODUODENOSCOPY, COLONOSCOPY, POSSIBLE BIOPSY, POSSIBLE POLYPECTOMY 09163, 10158;  Surgeon: Yan Quiñones MD;  Location: [facility]    Enlarge breast with implant Bilateral 2001    Implant left      Implant right      Other surgical history      abdominoplasty    Other surgical history      explant of breast implants    Other surgical history Left     neuroma removal of foot    Rev lower eyelid exten fat pad  10/14/2008    Performed [facility] LISA FERRARA at [facility]    Rev lower eyelid exten fat pad  10/14/2008    Performed by LISA FERRARA at [facility]    Rev upper eyelid w excess skin  10/14/2008    Performed by LISA FERRARA at [facility]    Rev upper eyelid w excess skin  10/14/2008    Performed by LISA FERRARA at [facility]    Shoulder arthroscopy Right 06/2019    manipulation    Upper gi endoscopy,diagnosis  02/28/2018    hiatal hernia, gastric and SB Bx taken       Social History:  reports that she has never smoked. She has never used smokeless tobacco. She reports current alcohol use. She reports that she does not use drugs.no illegal drugs, , 1 daughter, live alone, no cane or walker    Family History:   Family History   Problem Relation Age of Onset    Cancer Mother         lung/brain/liver    Other (Other) Other         self-MVP    Heart Disorder Father     Hypertension Neg     Lipids Neg     Obesity Neg     Psychiatric Neg    Mother passed  Father passed  1 of 2 brothers living  0 sisters    Allergies: No Known Allergies    Medications:    Current Facility-Administered Medications on File Prior to Encounter   Medication Dose Route Frequency Provider Last Rate Last Admin    [COMPLETED] potassium chloride (K-Dur) tab 40 mEq  40 mEq Oral Once Shanice Flowers MD   40 mEq at 04/24/24 1305    [COMPLETED] meropenem (Merrem) 1,000 mg in sodium chloride 0.9% 100 mL IVPB-MBP  1,000 mg Intravenous Prior to discharge Felix Monk APRN 33.3 mL/hr at 04/24/24 1619 1,000 mg at 04/24/24 1619    [COMPLETED] potassium chloride (K-Dur) tab 40 mEq  40 mEq Oral Q4H Porsche Aragon MD   40 mEq at 04/23/24 1220    [COMPLETED] potassium chloride (K-Dur) tab 40 mEq  40 mEq Oral Q4H Shanice Flowers MD   40 mEq at 04/22/24 1222    [COMPLETED] lidocaine PF (Xylocaine-MPF) 1% injection  5 mL Intradermal Once Anjana Salas MD   5 mL at 04/22/24 1625    [COMPLETED] potassium chloride 40 mEq in 250mL sodium chloride 0.9% IVPB premix   40 mEq Intravenous Once Shanice Flowers MD 62.5 mL/hr at 04/21/24 0842 40 mEq at 04/21/24 0842    Followed by    [COMPLETED] potassium chloride 20 mEq/100mL IVPB premix 20 mEq  20 mEq Intravenous Once Shanice Flowers MD 50 mL/hr at 04/21/24 1628 20 mEq at 04/21/24 1628    [COMPLETED] potassium chloride 40 mEq in 250mL sodium chloride 0.9% IVPB premix  40 mEq Intravenous Once Shanice Flowers MD 62.5 mL/hr at 04/20/24 1230 40 mEq at 04/20/24 1230    [COMPLETED] ketorolac (Toradol) 15 MG/ML injection 15 mg  15 mg Intravenous Once Santo Del Rio MD   15 mg at 04/19/24 1505    [COMPLETED] potassium chloride (K-Dur) tab 20 mEq  20 mEq Oral Once Santo Del Rio MD   20 mEq at 04/19/24 1513    [COMPLETED] potassium chloride 20 mEq/100mL IVPB premix 20 mEq  20 mEq Intravenous Once Santo Del Rio MD   Stopped at 04/19/24 1743    [COMPLETED] iopamidol 76% (ISOVUE-370) injection for power injector  75 mL Intravenous ONCE PRN Santo Del iRo MD   75 mL at 04/19/24 1534    [COMPLETED] piperacillin-tazobactam (Zosyn) 4.5 g in dextrose 5% 100 mL IVPB-ADDV  4.5 g Intravenous Once Santo Del Rio MD   Stopped at 04/19/24 1646    [COMPLETED] morphINE PF 2 MG/ML injection 2 mg  2 mg Intravenous Once Raymundo Conroy MD   2 mg at 04/19/24 1842     Current Outpatient Medications on File Prior to Encounter   Medication Sig Dispense Refill    meropenem 1 g Intravenous Recon Soln Inject 1 g into the vein every 8 (eight) hours for 20 days. PICC care per protocol. Please draw weekly CBC with diff, CMP and CRP for duration of abx therapy and fax results to [facility] at [phone]. Flushes per [facility] protocol.    If sending patient to [facility], please fax order to [phone], then call [phone] and select option #1 for scheduling. 60 g 0       Review of Systems:   A comprehensive 14 point review of systems was completed.    Pertinent positives and negatives noted in the HPI.    Physical Exam:    /53 (BP Location: Left arm)    Pulse 58   Temp 98.3 °F (36.8 °C) (Oral)   Resp 14   Ht 5' 4\" (1.626 m)   Wt 105 lb (47.6 kg)   LMP  (LMP Unknown)   SpO2 96%   BMI 18.02 kg/m²   General: No acute distress. Alert and oriented  HEENT: Normocephalic atraumatic. Moist mucous membranes. EOM-I.  Neck: No JVD. No carotid bruits.  Respiratory: Clear to auscultation bilaterally. No wheezes. No crackles  Cardiovascular: S1, S2. Regular rate and rhythm. No murmurs  Chest and Back: No tenderness or deformity.  Abdomen: Soft, post surgical tenderness, nondistended.  Hypoactivebowel sounds. No rebound, guarding  Neurologic: No focal neurological deficits. CNII-XII grossly intact. Sensation and strength intact  Musculoskeletal: Moves all extremities.  Extremities: No edema or tenderness of the LE  Integument: No new rashes or lesions.   Psychiatric: Appropriate mood and affect.      Diagnostic Data:      Labs:  No results for input(s): \"WBC\", \"HGB\", \"MCV\", \"PLT\", \"BAND\", \"INR\" in the last 168 hours.    Invalid input(s): \"LYM#\", \"MONO#\", \"BASOS#\", \"EOSIN#\"    No results for input(s): \"GLU\", \"BUN\", \"CREATSERUM\", \"GFRAA\", \"GFRNAA\", \"CA\", \"ALB\", \"NA\", \"K\", \"CL\", \"CO2\", \"ALKPHO\", \"AST\", \"ALT\", \"BILT\", \"TP\" in the last 168 hours.    CrCl cannot be calculated (Patient's most recent lab result is older than the maximum 7 days allowed.).    No results for input(s): \"PTP\", \"INR\" in the last 168 hours.    No results for input(s): \"TROP\", \"CK\" in the last 168 hours.    Imaging: Imaging data reviewed in Epic.      ASSESSMENT / PLAN:   74 year old female with no significant past medical history presenting with planned surgical intervention for recent acute appendicitis with perforation/abscess.     Acute Appendicitis/abscess  -pt admitted early April with this, received 2 drains and dc on iv abx --> completed course  -POD # 0 s/p diagnostic laparoscopy, open appendectomy, extensive lysis of adhesions  -surgery following  -npo, diet per surgery  -iv zosyn    Acute  post Operative pain  -as per surgery  -iv hydromorphone prn  -po oxy prn    Quality:  DVT Prophylaxis: scd  CODE status: Full per chart  Pike:     Plan of care discussed with patient and staff    Dispo: no discharge    Sam Villegas MD  [facility] Hospital[facility]  [phone]

## 2024-05-21 ENCOUNTER — APPOINTMENT (OUTPATIENT)
Dept: GENERAL RADIOLOGY | Facility: HOSPITAL | Age: 74
DRG: 329 | End: 2024-05-21
Attending: HOSPITALIST
Payer: MEDICARE

## 2024-05-21 PROBLEM — G89.18 POSTOPERATIVE PAIN: Status: ACTIVE | Noted: 2024-05-21

## 2024-05-21 LAB
ANION GAP SERPL CALC-SCNC: 3 MMOL/L (ref 0–18)
BASOPHILS # BLD AUTO: 0.01 X10(3) UL (ref 0–0.2)
BASOPHILS NFR BLD AUTO: 0.1 %
BUN BLD-MCNC: 9 MG/DL (ref 9–23)
CALCIUM BLD-MCNC: 8 MG/DL (ref 8.5–10.1)
CHLORIDE SERPL-SCNC: 104 MMOL/L (ref 98–112)
CO2 SERPL-SCNC: 30 MMOL/L (ref 21–32)
CREAT BLD-MCNC: 0.75 MG/DL
EGFRCR SERPLBLD CKD-EPI 2021: 85 ML/MIN/1.73M2 (ref 60–?)
EOSINOPHIL # BLD AUTO: 0 X10(3) UL (ref 0–0.7)
EOSINOPHIL NFR BLD AUTO: 0 %
ERYTHROCYTE [DISTWIDTH] IN BLOOD BY AUTOMATED COUNT: 12.9 %
GLUCOSE BLD-MCNC: 146 MG/DL (ref 70–99)
HCT VFR BLD AUTO: 35.2 %
HGB BLD-MCNC: 12.5 G/DL
IMM GRANULOCYTES # BLD AUTO: 0.02 X10(3) UL (ref 0–1)
IMM GRANULOCYTES NFR BLD: 0.2 %
LYMPHOCYTES # BLD AUTO: 0.66 X10(3) UL (ref 1–4)
LYMPHOCYTES NFR BLD AUTO: 6.6 %
MCH RBC QN AUTO: 29.6 PG (ref 26–34)
MCHC RBC AUTO-ENTMCNC: 35.5 G/DL (ref 31–37)
MCV RBC AUTO: 83.4 FL
MONOCYTES # BLD AUTO: 0.62 X10(3) UL (ref 0.1–1)
MONOCYTES NFR BLD AUTO: 6.2 %
NEUTROPHILS # BLD AUTO: 8.69 X10 (3) UL (ref 1.5–7.7)
NEUTROPHILS # BLD AUTO: 8.69 X10(3) UL (ref 1.5–7.7)
NEUTROPHILS NFR BLD AUTO: 86.9 %
OSMOLALITY SERPL CALC.SUM OF ELEC: 285 MOSM/KG (ref 275–295)
PLATELET # BLD AUTO: 175 10(3)UL (ref 150–450)
POTASSIUM SERPL-SCNC: 3.9 MMOL/L (ref 3.5–5.1)
RBC # BLD AUTO: 4.22 X10(6)UL
SODIUM SERPL-SCNC: 137 MMOL/L (ref 136–145)
WBC # BLD AUTO: 10 X10(3) UL (ref 4–11)

## 2024-05-21 PROCEDURE — 85025 COMPLETE CBC W/AUTO DIFF WBC: CPT | Performed by: PHYSICIAN ASSISTANT

## 2024-05-21 PROCEDURE — 80048 BASIC METABOLIC PNL TOTAL CA: CPT | Performed by: PHYSICIAN ASSISTANT

## 2024-05-21 PROCEDURE — 71045 X-RAY EXAM CHEST 1 VIEW: CPT | Performed by: HOSPITALIST

## 2024-05-21 RX ORDER — AMOXICILLIN AND CLAVULANATE POTASSIUM 875; 125 MG/1; MG/1
1 TABLET, FILM COATED ORAL 2 TIMES DAILY
Qty: 20 TABLET | Refills: 0 | Status: SHIPPED | OUTPATIENT
Start: 2024-05-21 | End: 2024-05-27

## 2024-05-21 RX ORDER — METOCLOPRAMIDE HYDROCHLORIDE 5 MG/5ML
10 SOLUTION ORAL EVERY 6 HOURS PRN
Status: DISCONTINUED | OUTPATIENT
Start: 2024-05-21 | End: 2024-05-28

## 2024-05-21 RX ORDER — ONDANSETRON 2 MG/ML
4 INJECTION INTRAMUSCULAR; INTRAVENOUS EVERY 6 HOURS PRN
Status: DISCONTINUED | OUTPATIENT
Start: 2024-05-21 | End: 2024-05-28

## 2024-05-21 RX ORDER — METOCLOPRAMIDE HYDROCHLORIDE 5 MG/ML
INJECTION INTRAMUSCULAR; INTRAVENOUS
Status: COMPLETED
Start: 2024-05-21 | End: 2024-05-21

## 2024-05-21 RX ORDER — KETOROLAC TROMETHAMINE 15 MG/ML
15 INJECTION, SOLUTION INTRAMUSCULAR; INTRAVENOUS EVERY 6 HOURS PRN
Status: DISPENSED | OUTPATIENT
Start: 2024-05-21 | End: 2024-05-23

## 2024-05-21 RX ORDER — HYDROCODONE BITARTRATE AND ACETAMINOPHEN 5; 325 MG/1; MG/1
1-2 TABLET ORAL EVERY 6 HOURS PRN
Qty: 20 TABLET | Refills: 0 | Status: SHIPPED | OUTPATIENT
Start: 2024-05-21

## 2024-05-21 RX ORDER — ACETAMINOPHEN 10 MG/ML
15 INJECTION, SOLUTION INTRAVENOUS EVERY 6 HOURS PRN
Status: DISCONTINUED | OUTPATIENT
Start: 2024-05-21 | End: 2024-05-23

## 2024-05-21 RX ORDER — SIMETHICONE 80 MG
80 TABLET,CHEWABLE ORAL 4 TIMES DAILY PRN
Status: DISCONTINUED | OUTPATIENT
Start: 2024-05-21 | End: 2024-05-28

## 2024-05-21 NOTE — PROGRESS NOTES
Vss. Pt resting in bed. Pt on ra with 02 sats wnl. Lungs cta. Pt denies difficulty breathing. Denies n/v but did c/o some nausea when sitting up. No emesis noted. Pt denies passing flatus but is belching. Abdomen soft. Bs hypoactive on auscultation. Midline with aquacel dressing in place. Pt reports pain to abdomen as a 7 on a 1-10 scale. Denies need for pain medication, warm packs given. Dr. Monsivais paged regarding ordering Ofirmiv and new orders received. Derek drain with small amount of serosanguinous drainage present. Ivf infusing, site intact. Pike removed, pt remains dtv. Poc updated with pt and pt shows understanding. Will monitor.     1245: Pt up to bathroom to void. Able to void small amount but not much more. Denies pressure or need to void more. Pt did become nauseated when ambulating in room. Denies emesis and is belching but denies passing flatus. MOO Vazquez called and new orders for reglan/toradol received. Pt rates pain to abdomen as a 7 and would like to stay away from narcotics if possible. Toradol and reglan given. Pt bladder scanned and PVR of 119ml noted. Will give more time per protocol and recheck at 1500. Pt updated on poc. Will monitor.     1407: MOO Marroquin on floor and notified of pt being DTV and low PVR. New orders received for bolus. Pt updated. Will monitor.

## 2024-05-21 NOTE — PROGRESS NOTES
[facility] Hospitalist Progress Note                                                                     [facility]   part of [facility]      Merissa sOhea  5/12/1950    SUBJECTIVE: no chest pain, palpitations, shortness of breath, cough, nausea, vomiting. Patient with post operative pain. No flatus. Pt ambulating minimally today as she was dizzy when getting gup    OBJECTIVE:  Temp:  [97.5 °F (36.4 °C)-98.3 °F (36.8 °C)] 98.1 °F (36.7 °C)  Pulse:  [51-70] 61  Resp:  [10-26] 16  BP: ()/(53-78) 108/58  SpO2:  [96 %-100 %] 98 %  Exam  Gen: No acute distress, alert and oriented   Pulm: Lungs clear bilaterally, normal respiratory effort, no crackles, no wheezing  CV: Heart with regular rate and rhythm, no murmur.   Abd: Abdomen soft, post surgical tenderness, nondistended, bowel sounds present-hypoactive  MSK: no pitting edema or tenderness of the LE  Skin: no new rashes or lesions    Labs:   No results for input(s): \"WBC\", \"HGB\", \"MCV\", \"PLT\", \"BAND\", \"INR\" in the last 168 hours.    Invalid input(s): \"LYM#\", \"MONO#\", \"BASOS#\", \"EOSIN#\"    No results for input(s): \"NA\", \"K\", \"CL\", \"CO2\", \"BUN\", \"CREATSERUM\", \"CA\", \"CAION\", \"MG\", \"PHOS\", \"GLU\" in the last 168 hours.    No results for input(s): \"ALT\", \"AST\", \"ALB\", \"AMYLASE\", \"LIPASE\", \"LDH\" in the last 168 hours.    Invalid input(s): \"ALPHOS\", \"TBIL\", \"DBIL\", \"TPROT\"    No results for input(s): \"PGLU\" in the last 168 hours.    Meds:   Scheduled:    piperacillin-tazobactam  3.375 g Intravenous Q8H     Continuous Infusions:    lactated ringers Stopped (05/20/24 1059)    dextrose in lactated ringers 100 mL/hr at 05/20/24 2145     PRN:   simethicone    oxyCODONE **OR** oxyCODONE    HYDROmorphone **OR** HYDROmorphone    ASSESSMENT / PLAN:   74 year old female with no significant past medical history presenting with planned surgical intervention for recent acute appendicitis with perforation/abscess.      Acute  Appendicitis/abscess  -pt admitted early April with this, received 2 drains and dc on iv abx --> completed course  -POD # 1 s/p diagnostic laparoscopy, open appendectomy, extensive lysis of adhesions  -surgery following  -npo, diet per surgery  -iv zosyn     Acute post Operative pain  -as per surgery  -iv hydromorphone prn  -po oxy prn     Quality:  DVT Prophylaxis: scd  CODE status: Full per chart  Pike:      Plan of care discussed with patient and staff     Dispo: no discharge     Sam Villegas MD  [facility] Hospitalist  [phone]

## 2024-05-21 NOTE — PLAN OF CARE
A&Ox4. VSS. RA. . Denies chest pain and SOB.    GI: Abdomen soft, nondistended.  Denies passing gas.   Denies nausea.   : Voids.   Pain controlled with PRN pain medications.   Up with standby assist.   Drains: lt TYREL drain to bulb suction  Incisions: Midline with aquacel (C/D/I)  Diet: NPO with ice chips  IVF running per order.   All appropriate safety measures in place. All questions and concerns addressed.

## 2024-05-21 NOTE — PROGRESS NOTES
[facility]  Progress Note    Merissa Oshea Patient Status:  Outpatient in a Bed    1950 MRN [id number]   Location [facility] 3NW-A Attending Jovanni Monsivais MD   Hosp Day # 0 PCP Cruz Puckett MD     Subjective:    Patient feels nausea and bloated, no flatus  Noted some dizziness this morning    Objective/Physical Exam:    Vital Signs:  Blood pressure 108/61, pulse 66, temperature 99.3 °F (37.4 °C), temperature source Oral, resp. rate 16, height 5' 4\" (1.626 m), weight 105 lb (47.6 kg), SpO2 99%, not currently breastfeeding.    General:  Alert, orientated x3.  Cooperative.  No apparent distress.  Abdomen:  derek ss output, aquacel intact, soft, mildly distended, mild tenderness as expected        Problem List:  Patient Active Problem List   Diagnosis    Lumbar stenosis    Decreased strength of upper extremity    Plantar neuroma of left foot    Capsular contracture of breast implant    Depressive disorder    Hearing loss    Martinez's metatarsalgia    Bradycardia    Diarrhea of presumed infectious origin    Acute appendicitis with perforation, generalized peritonitis, and abscess, unspecified whether gangrene present    Dehydration    Hypokalemia    Hyponatremia       Impression:     75 y/o POD # 1 Diagnostic laparoscopy, open appendectomy, extensive lysis of adhesions.   Afebrile, no leukocytosis  Derek output ss, soft, distended, mild expected tenderness. + nausea    Plan:    NPO ice chips, gum hard candy ok  Encourage up in chair once dizziness resolves  Will add toradol  Continue IV antibiotics transition to PO for dc home  All questions answered    MOO Solares  [facility]  General Surgery  2024

## 2024-05-21 NOTE — DIETARY NOTE
[facility]   part of [facility]    NUTRITION ASSESSMENT    Pt does not meet malnutrition criteria at this time.      NUTRITION INTERVENTION:    Meal and Snacks - Monitor and encourage adequate PO intake.       PATIENT STATUS: 05/21/24 74 year old female presented 5/20 for planned surgical intervention for recent acute appendicitis with perforation/abscess. Patient was admitted a few weeks ago with a diagnosis of acute appendicitis with abscess formation.  Patient had 2 drains placed and was placed on IV antibiotics and discharged home.  Now s/p diagnostic laparoscopy, open appendectomy, extensive lysis of adhesions. Chart reviewed for +MST/low BMI. Address recent wt loss, pt states \"I just finished a bowel prep\". Per EPIC, pt is down 13#(11%) x 1 month-significant per standards. Pt is a vegetarian. Does report decreased PO with recent appendicitis. Declined ONS. Pt states she found items on the menu okay at last visit. Currently NPO. RD to follow      ANTHROPOMETRICS:  Ht: 162.6 cm (5' 4\")  Wt: 47.6 kg (105 lb).   BMI: Body mass index is 18.02 kg/m².  IBW: 54 kg      WEIGHT HISTORY:   Weight loss: Yes, 13# (11%) x 1 month    Wt Readings from Last 10 Encounters:   05/20/24 47.6 kg (105 lb)   04/19/24 53.5 kg (118 lb)   09/24/23 53.5 kg (118 lb)   03/05/23 53.5 kg (118 lb)   01/04/23 53.5 kg (118 lb)   01/31/22 54.4 kg (120 lb)   06/08/21 54.9 kg (121 lb)   12/31/20 55.8 kg (123 lb)   12/10/19 55.8 kg (123 lb)   07/11/19 55.8 kg (123 lb)        NUTRITION:  Diet:       Procedures    NPO      Food Allergies: No  Cultural/Ethnic/Alevism Preferences Addressed: Yes    Percent Meals Eaten (last 3 days)       None            GI system review: WNL Last BM: PTA  Skin and wounds: intact    NUTRITION RELATED PHYSICAL FINDINGS:     1. Body Fat/Muscle Mass: no wasting noted     2. Fluid Accumulation: none per RN documentation     NUTRITION PRESCRIPTION: 47.6kg  Calories:  5848-4534 calories/day (30-35  kcal/kg)  Protein: 56-70 grams protein/day (1.2-1.5 grams protein per kg)  Fluid: ~1 ml/kcal or per MD discretion    NUTRITION DIAGNOSIS/PROBLEM:  Underweight related to decreased intake as evidenced by documented/reported unintentional weight loss and low BMI      MONITOR AND EVALUATE/NUTRITION GOALS:  PO intake of 75% of meals TID - New  Weight stable within 1 to 2 lbs during admission - New      MEDICATIONS:  Reviewed    LABS:  Reviewed    Pt is at Moderate nutrition risk    Sowmya Jones RD, LDN  Clinical Nutrition  [id number]

## 2024-05-22 LAB
ANION GAP SERPL CALC-SCNC: 5 MMOL/L (ref 0–18)
BUN BLD-MCNC: 8 MG/DL (ref 9–23)
CALCIUM BLD-MCNC: 7.7 MG/DL (ref 8.5–10.1)
CHLORIDE SERPL-SCNC: 105 MMOL/L (ref 98–112)
CO2 SERPL-SCNC: 29 MMOL/L (ref 21–32)
CREAT BLD-MCNC: 0.7 MG/DL
EGFRCR SERPLBLD CKD-EPI 2021: 92 ML/MIN/1.73M2 (ref 60–?)
ERYTHROCYTE [DISTWIDTH] IN BLOOD BY AUTOMATED COUNT: 13 %
GLUCOSE BLD-MCNC: 118 MG/DL (ref 70–99)
HCT VFR BLD AUTO: 35.8 %
HGB BLD-MCNC: 12.2 G/DL
MCH RBC QN AUTO: 29.5 PG (ref 26–34)
MCHC RBC AUTO-ENTMCNC: 34.1 G/DL (ref 31–37)
MCV RBC AUTO: 86.5 FL
OSMOLALITY SERPL CALC.SUM OF ELEC: 287 MOSM/KG (ref 275–295)
PLATELET # BLD AUTO: 168 10(3)UL (ref 150–450)
POTASSIUM SERPL-SCNC: 3.7 MMOL/L (ref 3.5–5.1)
RBC # BLD AUTO: 4.14 X10(6)UL
SODIUM SERPL-SCNC: 139 MMOL/L (ref 136–145)
WBC # BLD AUTO: 6.4 X10(3) UL (ref 4–11)

## 2024-05-22 PROCEDURE — 80048 BASIC METABOLIC PNL TOTAL CA: CPT | Performed by: SURGERY

## 2024-05-22 PROCEDURE — 85027 COMPLETE CBC AUTOMATED: CPT | Performed by: SURGERY

## 2024-05-22 RX ORDER — SODIUM CHLORIDE 9 MG/ML
INJECTION, SOLUTION INTRAVENOUS CONTINUOUS
Status: DISCONTINUED | OUTPATIENT
Start: 2024-05-22 | End: 2024-05-28

## 2024-05-22 NOTE — PLAN OF CARE
Pt resting in bed this morning. Pt reports that pain is much better and likes to alternate Toradol and Ofirmev for best relief. Abd soft with active bowel sounds noted; pt denies nausea; midline inc CDI with staples - pt prefers to keep covered with ABD pad. TYREL intact to bulb suction with serosanguinous drainage; TYREL dressing changed. Use of IS encouraged - lungs clear but diminished; able to reach 750 with goal of 1500 by end of day.

## 2024-05-22 NOTE — PROGRESS NOTES
ONI Hospitalist Progress Note                                                                     [facility]   part of [facility]      Merissa Oshea  5/12/1950    SUBJECTIVE: no chest pain, palpitations, shortness of breath, cough, nausea, vomiting. Patient with post operative pain. Worse last night but improved this am. No flatus.    OBJECTIVE:  Temp:  [97.9 °F (36.6 °C)-99.9 °F (37.7 °C)] 98.4 °F (36.9 °C)  Pulse:  [64-69] 64  Resp:  [17-18] 18  BP: (103-116)/(60-68) 103/64  SpO2:  [99 %-100 %] 99 %  Exam  Gen: No acute distress, alert and oriented   Pulm: Lungs clear bilaterally, normal respiratory effort, no crackles, no wheezing  CV: Heart with regular rate and rhythm, no murmur.   Abd: Abdomen soft, post surgical tenderness, nondistended, bowel sounds present-hypoactive  MSK: no pitting edema or tenderness of the LE  Skin: no new rashes or lesions    Labs:   Recent Labs   Lab 05/21/24  1134 05/22/24  0536   WBC 10.0 6.4   HGB 12.5 12.2   MCV 83.4 86.5   .0 168.0       Recent Labs   Lab 05/21/24  1134 05/22/24  0536    139   K 3.9 3.7    105   CO2 30.0 29.0   BUN 9 8*   CREATSERUM 0.75 0.70   CA 8.0* 7.7*   * 118*       No results for input(s): \"ALT\", \"AST\", \"ALB\", \"AMYLASE\", \"LIPASE\", \"LDH\" in the last 168 hours.    Invalid input(s): \"ALPHOS\", \"TBIL\", \"DBIL\", \"TPROT\"    No results for input(s): \"PGLU\" in the last 168 hours.    Meds:   Scheduled:    piperacillin-tazobactam  3.375 g Intravenous Q8H     Continuous Infusions:    lactated ringers Stopped (05/20/24 1059)    dextrose in lactated ringers 100 mL/hr at 05/21/24 1730     PRN:   simethicone    ondansetron    acetaminophen    ketorolac    metoclopramide    oxyCODONE **OR** oxyCODONE    HYDROmorphone **OR** HYDROmorphone    ASSESSMENT / PLAN:   74 year old female with no significant past medical history presenting with planned surgical intervention for recent acute  appendicitis with perforation/abscess.      Acute Appendicitis/abscess  -pt admitted early April with this, received 2 drains and dc on iv abx --> completed course prior to admission  -POD # 2 s/p diagnostic laparoscopy, open appendectomy, extensive lysis of adhesions  -surgery following  -npo, diet per surgery  -iv zosyn     Acute post Operative pain  -as per surgery  -iv hydromorphone prn  -po oxy prn     Quality:  DVT Prophylaxis: scd  CODE status: Full per chart  Pike:      Plan of care discussed with patient and staff     Dispo: no discharge     Sam Villegas MD  [facility] Hospitalist  [phone]

## 2024-05-22 NOTE — PROGRESS NOTES
[facility]  Progress Note    Merissa Oshea Patient Status:  Inpatient    1950 MRN [id number]   Location [facility] 3NW-A Attending Jovanni Monsivais MD   Hosp Day # 1 PCP Cruz Puckett MD     Subjective:    Patient reports she is feeling somewhat better than last night.   She is not passing any gas  BLANKA ss output this morning   Concern of bile in BLANKA overnight     Objective/Physical Exam:    Vital Signs:  Blood pressure 103/64, pulse 69, temperature 98 °F (36.7 °C), temperature source Oral, resp. rate 18, height 5' 4\" (1.626 m), weight 105 lb (47.6 kg), SpO2 99%, not currently breastfeeding.    General:  Alert, orientated x3.  Cooperative.  No apparent distress.  Abdomen:  staples intact, soft, tenderness left of midline, blanka ss output    Labs:  Reviewed    Lab Results   Component Value Date    WBC 6.4 2024    HGB 12.2 2024    HCT 35.8 2024    .0 2024    CREATSERUM 0.70 2024    BUN 8 2024     2024    K 3.7 2024     2024    CO2 29.0 2024     2024    CA 7.7 2024       Xray:  Reviewed    Problem List:  Patient Active Problem List   Diagnosis    Lumbar stenosis    Decreased strength of upper extremity    Plantar neuroma of left foot    Capsular contracture of breast implant    Depressive disorder    Hearing loss    Martinez's metatarsalgia    Bradycardia    Diarrhea of presumed infectious origin    Acute appendicitis with perforation, generalized peritonitis, and abscess, unspecified whether gangrene present    Dehydration    Hypokalemia    Hyponatremia    Postoperative pain           Impression:     73 y/o S/p: Diagnostic laparoscopy, open appendectomy, extensive lysis of adhesions.   Afebrile, no leukocytosis  BLANKA ss output this morning    Plan:    NPO ice chips, italian ice ok   IV fluids  IV antibiotics  Will recheck this afternoon  All questions answered   Patient seen and examined with   [place]    MOO Solares  [facility]  General Surgery  5/22/2024

## 2024-05-22 NOTE — PLAN OF CARE
0032: Pt VSS, AOx4. Pt temps of 99 in the day, most recent at 98 (MD aware). Pt midline incision c/d/staples intact; abd pad over for comfort. TYREL draining moderate amounts of bile/green drainage; MD paged and aware, labs ordered for AM. Pt reports pain 7 out of 10 in abdomen; down to 6 out of 10 after Simethicone, Dilaudid and IV Tylenol. NPO + ice chips continued. Pt bowel sounds present, she is belching, no flatus yet.  No BM this shift. Voids in adequate amounts without difficulty. Up with standby, encouraged to ambulate often and as tolerated. Pt on room air; denies RASHMI/SOB/lightheadedness. Achieves approx 750 ml of incentive spirometer. IV fluids continued per orders, between ABX. SCDs declined this evening. Fall & safety precautions in place. POC discussed.     0230: TYREL drainage has slowed down (5 ml out at this time, no clots, tubing patent). Pain has improved, as reported by pt, to 4 out of 10.

## 2024-05-23 ENCOUNTER — ANESTHESIA (OUTPATIENT)
Dept: SURGERY | Facility: HOSPITAL | Age: 74
DRG: 329 | End: 2024-05-23
Payer: MEDICARE

## 2024-05-23 ENCOUNTER — ANESTHESIA EVENT (OUTPATIENT)
Dept: SURGERY | Facility: HOSPITAL | Age: 74
DRG: 329 | End: 2024-05-23
Payer: MEDICARE

## 2024-05-23 LAB
ANION GAP SERPL CALC-SCNC: 6 MMOL/L (ref 0–18)
BASOPHILS # BLD AUTO: 0.03 X10(3) UL (ref 0–0.2)
BASOPHILS NFR BLD AUTO: 0.4 %
BUN BLD-MCNC: 9 MG/DL (ref 9–23)
CALCIUM BLD-MCNC: 8 MG/DL (ref 8.5–10.1)
CHLORIDE SERPL-SCNC: 108 MMOL/L (ref 98–112)
CO2 SERPL-SCNC: 28 MMOL/L (ref 21–32)
CREAT BLD-MCNC: 0.62 MG/DL
EGFRCR SERPLBLD CKD-EPI 2021: 95 ML/MIN/1.73M2 (ref 60–?)
EOSINOPHIL # BLD AUTO: 0.09 X10(3) UL (ref 0–0.7)
EOSINOPHIL NFR BLD AUTO: 1.1 %
ERYTHROCYTE [DISTWIDTH] IN BLOOD BY AUTOMATED COUNT: 13 %
GLUCOSE BLD-MCNC: 101 MG/DL (ref 70–99)
HCT VFR BLD AUTO: 30.2 %
HGB BLD-MCNC: 10.4 G/DL
IMM GRANULOCYTES # BLD AUTO: 0.07 X10(3) UL (ref 0–1)
IMM GRANULOCYTES NFR BLD: 0.8 %
LYMPHOCYTES # BLD AUTO: 0.93 X10(3) UL (ref 1–4)
LYMPHOCYTES NFR BLD AUTO: 11 %
MAGNESIUM SERPL-MCNC: 1.9 MG/DL (ref 1.6–2.6)
MCH RBC QN AUTO: 29.4 PG (ref 26–34)
MCHC RBC AUTO-ENTMCNC: 34.4 G/DL (ref 31–37)
MCV RBC AUTO: 85.3 FL
MONOCYTES # BLD AUTO: 0.47 X10(3) UL (ref 0.1–1)
MONOCYTES NFR BLD AUTO: 5.6 %
NEUTROPHILS # BLD AUTO: 6.86 X10 (3) UL (ref 1.5–7.7)
NEUTROPHILS # BLD AUTO: 6.86 X10(3) UL (ref 1.5–7.7)
NEUTROPHILS NFR BLD AUTO: 81.1 %
OSMOLALITY SERPL CALC.SUM OF ELEC: 293 MOSM/KG (ref 275–295)
PLATELET # BLD AUTO: 180 10(3)UL (ref 150–450)
POTASSIUM SERPL-SCNC: 3.5 MMOL/L (ref 3.5–5.1)
RBC # BLD AUTO: 3.54 X10(6)UL
SODIUM SERPL-SCNC: 142 MMOL/L (ref 136–145)
WBC # BLD AUTO: 8.5 X10(3) UL (ref 4–11)

## 2024-05-23 PROCEDURE — 87205 SMEAR GRAM STAIN: CPT | Performed by: SURGERY

## 2024-05-23 PROCEDURE — 85025 COMPLETE CBC W/AUTO DIFF WBC: CPT | Performed by: PHYSICIAN ASSISTANT

## 2024-05-23 PROCEDURE — 80048 BASIC METABOLIC PNL TOTAL CA: CPT | Performed by: HOSPITALIST

## 2024-05-23 PROCEDURE — 83735 ASSAY OF MAGNESIUM: CPT | Performed by: HOSPITALIST

## 2024-05-23 PROCEDURE — 0WJG0ZZ INSPECTION OF PERITONEAL CAVITY, OPEN APPROACH: ICD-10-PCS | Performed by: SURGERY

## 2024-05-23 PROCEDURE — S0028 INJECTION, FAMOTIDINE, 20 MG: HCPCS | Performed by: PHYSICIAN ASSISTANT

## 2024-05-23 PROCEDURE — 87070 CULTURE OTHR SPECIMN AEROBIC: CPT | Performed by: SURGERY

## 2024-05-23 PROCEDURE — 87106 FUNGI IDENTIFICATION YEAST: CPT | Performed by: SURGERY

## 2024-05-23 PROCEDURE — 0DB80ZZ EXCISION OF SMALL INTESTINE, OPEN APPROACH: ICD-10-PCS | Performed by: SURGERY

## 2024-05-23 PROCEDURE — 88307 TISSUE EXAM BY PATHOLOGIST: CPT | Performed by: SURGERY

## 2024-05-23 RX ORDER — ONDANSETRON 2 MG/ML
INJECTION INTRAMUSCULAR; INTRAVENOUS
Status: COMPLETED
Start: 2024-05-23 | End: 2024-05-23

## 2024-05-23 RX ORDER — SODIUM CHLORIDE, SODIUM LACTATE, POTASSIUM CHLORIDE, CALCIUM CHLORIDE 600; 310; 30; 20 MG/100ML; MG/100ML; MG/100ML; MG/100ML
INJECTION, SOLUTION INTRAVENOUS CONTINUOUS
Status: DISCONTINUED | OUTPATIENT
Start: 2024-05-23 | End: 2024-05-23 | Stop reason: HOSPADM

## 2024-05-23 RX ORDER — LIDOCAINE HYDROCHLORIDE AND EPINEPHRINE 10; 10 MG/ML; UG/ML
INJECTION, SOLUTION INFILTRATION; PERINEURAL AS NEEDED
Status: DISCONTINUED | OUTPATIENT
Start: 2024-05-23 | End: 2024-05-23 | Stop reason: HOSPADM

## 2024-05-23 RX ORDER — MAGNESIUM OXIDE 400 MG/1
400 TABLET ORAL DAILY
Status: DISCONTINUED | OUTPATIENT
Start: 2024-05-23 | End: 2024-05-28

## 2024-05-23 RX ORDER — KETOROLAC TROMETHAMINE 15 MG/ML
15 INJECTION, SOLUTION INTRAMUSCULAR; INTRAVENOUS EVERY 6 HOURS PRN
Status: DISCONTINUED | OUTPATIENT
Start: 2024-05-23 | End: 2024-05-25

## 2024-05-23 RX ORDER — LABETALOL HYDROCHLORIDE 5 MG/ML
INJECTION, SOLUTION INTRAVENOUS AS NEEDED
Status: DISCONTINUED | OUTPATIENT
Start: 2024-05-23 | End: 2024-05-23 | Stop reason: SURG

## 2024-05-23 RX ORDER — KETOROLAC TROMETHAMINE 30 MG/ML
15 INJECTION, SOLUTION INTRAMUSCULAR; INTRAVENOUS ONCE
Status: COMPLETED | OUTPATIENT
Start: 2024-05-23 | End: 2024-05-23

## 2024-05-23 RX ORDER — OXYCODONE HYDROCHLORIDE 5 MG/1
2.5 TABLET ORAL EVERY 4 HOURS PRN
Status: DISCONTINUED | OUTPATIENT
Start: 2024-05-23 | End: 2024-05-28

## 2024-05-23 RX ORDER — SODIUM CHLORIDE, SODIUM LACTATE, POTASSIUM CHLORIDE, CALCIUM CHLORIDE 600; 310; 30; 20 MG/100ML; MG/100ML; MG/100ML; MG/100ML
INJECTION, SOLUTION INTRAVENOUS CONTINUOUS PRN
Status: DISCONTINUED | OUTPATIENT
Start: 2024-05-23 | End: 2024-05-23 | Stop reason: SURG

## 2024-05-23 RX ORDER — DIPHENHYDRAMINE HYDROCHLORIDE 50 MG/ML
12.5 INJECTION INTRAMUSCULAR; INTRAVENOUS AS NEEDED
Status: DISCONTINUED | OUTPATIENT
Start: 2024-05-23 | End: 2024-05-23 | Stop reason: HOSPADM

## 2024-05-23 RX ORDER — SENNOSIDES 8.6 MG
17.2 TABLET ORAL NIGHTLY PRN
Status: DISCONTINUED | OUTPATIENT
Start: 2024-05-23 | End: 2024-05-28

## 2024-05-23 RX ORDER — ACETAMINOPHEN 500 MG
1000 TABLET ORAL ONCE AS NEEDED
Status: DISCONTINUED | OUTPATIENT
Start: 2024-05-23 | End: 2024-05-23 | Stop reason: HOSPADM

## 2024-05-23 RX ORDER — HYDROMORPHONE HYDROCHLORIDE 1 MG/ML
0.4 INJECTION, SOLUTION INTRAMUSCULAR; INTRAVENOUS; SUBCUTANEOUS EVERY 2 HOUR PRN
Status: DISCONTINUED | OUTPATIENT
Start: 2024-05-23 | End: 2024-05-28

## 2024-05-23 RX ORDER — HYDROMORPHONE HYDROCHLORIDE 1 MG/ML
INJECTION, SOLUTION INTRAMUSCULAR; INTRAVENOUS; SUBCUTANEOUS
Status: COMPLETED
Start: 2024-05-23 | End: 2024-05-23

## 2024-05-23 RX ORDER — GLYCOPYRROLATE 0.2 MG/ML
INJECTION, SOLUTION INTRAMUSCULAR; INTRAVENOUS AS NEEDED
Status: DISCONTINUED | OUTPATIENT
Start: 2024-05-23 | End: 2024-05-23 | Stop reason: SURG

## 2024-05-23 RX ORDER — HEPARIN SODIUM 5000 [USP'U]/ML
5000 INJECTION, SOLUTION INTRAVENOUS; SUBCUTANEOUS EVERY 12 HOURS SCHEDULED
Status: DISCONTINUED | OUTPATIENT
Start: 2024-05-24 | End: 2024-05-25

## 2024-05-23 RX ORDER — SODIUM CHLORIDE, SODIUM LACTATE, POTASSIUM CHLORIDE, CALCIUM CHLORIDE 600; 310; 30; 20 MG/100ML; MG/100ML; MG/100ML; MG/100ML
100 INJECTION, SOLUTION INTRAVENOUS CONTINUOUS
Status: DISCONTINUED | OUTPATIENT
Start: 2024-05-23 | End: 2024-05-25

## 2024-05-23 RX ORDER — POLYETHYLENE GLYCOL 3350 17 G/17G
17 POWDER, FOR SOLUTION ORAL DAILY PRN
Status: DISCONTINUED | OUTPATIENT
Start: 2024-05-23 | End: 2024-05-28

## 2024-05-23 RX ORDER — HYDROCODONE BITARTRATE AND ACETAMINOPHEN 5; 325 MG/1; MG/1
1 TABLET ORAL ONCE AS NEEDED
Status: DISCONTINUED | OUTPATIENT
Start: 2024-05-23 | End: 2024-05-23 | Stop reason: HOSPADM

## 2024-05-23 RX ORDER — LIDOCAINE HYDROCHLORIDE 10 MG/ML
INJECTION, SOLUTION EPIDURAL; INFILTRATION; INTRACAUDAL; PERINEURAL AS NEEDED
Status: DISCONTINUED | OUTPATIENT
Start: 2024-05-23 | End: 2024-05-23 | Stop reason: SURG

## 2024-05-23 RX ORDER — HYDROMORPHONE HYDROCHLORIDE 1 MG/ML
0.4 INJECTION, SOLUTION INTRAMUSCULAR; INTRAVENOUS; SUBCUTANEOUS EVERY 5 MIN PRN
Status: DISCONTINUED | OUTPATIENT
Start: 2024-05-23 | End: 2024-05-23 | Stop reason: HOSPADM

## 2024-05-23 RX ORDER — FAMOTIDINE 20 MG/1
20 TABLET, FILM COATED ORAL 2 TIMES DAILY
Status: DISCONTINUED | OUTPATIENT
Start: 2024-05-23 | End: 2024-05-28

## 2024-05-23 RX ORDER — FAMOTIDINE 10 MG/ML
20 INJECTION, SOLUTION INTRAVENOUS 2 TIMES DAILY
Status: DISCONTINUED | OUTPATIENT
Start: 2024-05-23 | End: 2024-05-28

## 2024-05-23 RX ORDER — BUPIVACAINE HYDROCHLORIDE 5 MG/ML
INJECTION, SOLUTION EPIDURAL; INTRACAUDAL AS NEEDED
Status: DISCONTINUED | OUTPATIENT
Start: 2024-05-23 | End: 2024-05-23 | Stop reason: HOSPADM

## 2024-05-23 RX ORDER — ONDANSETRON 2 MG/ML
4 INJECTION INTRAMUSCULAR; INTRAVENOUS EVERY 6 HOURS PRN
Status: DISCONTINUED | OUTPATIENT
Start: 2024-05-23 | End: 2024-05-23 | Stop reason: HOSPADM

## 2024-05-23 RX ORDER — NEOSTIGMINE METHYLSULFATE 1 MG/ML
INJECTION, SOLUTION INTRAVENOUS AS NEEDED
Status: DISCONTINUED | OUTPATIENT
Start: 2024-05-23 | End: 2024-05-23 | Stop reason: SURG

## 2024-05-23 RX ORDER — OXYCODONE HYDROCHLORIDE 10 MG/1
5 TABLET ORAL EVERY 4 HOURS PRN
Status: DISCONTINUED | OUTPATIENT
Start: 2024-05-23 | End: 2024-05-28

## 2024-05-23 RX ORDER — ONDANSETRON 2 MG/ML
INJECTION INTRAMUSCULAR; INTRAVENOUS AS NEEDED
Status: DISCONTINUED | OUTPATIENT
Start: 2024-05-23 | End: 2024-05-23 | Stop reason: SURG

## 2024-05-23 RX ORDER — ROCURONIUM BROMIDE 10 MG/ML
INJECTION, SOLUTION INTRAVENOUS AS NEEDED
Status: DISCONTINUED | OUTPATIENT
Start: 2024-05-23 | End: 2024-05-23 | Stop reason: SURG

## 2024-05-23 RX ORDER — NALOXONE HYDROCHLORIDE 0.4 MG/ML
0.08 INJECTION, SOLUTION INTRAMUSCULAR; INTRAVENOUS; SUBCUTANEOUS AS NEEDED
Status: DISCONTINUED | OUTPATIENT
Start: 2024-05-23 | End: 2024-05-23 | Stop reason: HOSPADM

## 2024-05-23 RX ORDER — HYDROMORPHONE HYDROCHLORIDE 1 MG/ML
0.6 INJECTION, SOLUTION INTRAMUSCULAR; INTRAVENOUS; SUBCUTANEOUS EVERY 5 MIN PRN
Status: DISCONTINUED | OUTPATIENT
Start: 2024-05-23 | End: 2024-05-23 | Stop reason: HOSPADM

## 2024-05-23 RX ORDER — METRONIDAZOLE 500 MG/100ML
INJECTION, SOLUTION INTRAVENOUS AS NEEDED
Status: DISCONTINUED | OUTPATIENT
Start: 2024-05-23 | End: 2024-05-23 | Stop reason: SURG

## 2024-05-23 RX ORDER — ACETAMINOPHEN 10 MG/ML
15 INJECTION, SOLUTION INTRAVENOUS EVERY 6 HOURS PRN
Status: DISCONTINUED | OUTPATIENT
Start: 2024-05-23 | End: 2024-05-25

## 2024-05-23 RX ORDER — HYDROMORPHONE HYDROCHLORIDE 1 MG/ML
0.2 INJECTION, SOLUTION INTRAMUSCULAR; INTRAVENOUS; SUBCUTANEOUS EVERY 5 MIN PRN
Status: DISCONTINUED | OUTPATIENT
Start: 2024-05-23 | End: 2024-05-23 | Stop reason: HOSPADM

## 2024-05-23 RX ORDER — MIDAZOLAM HYDROCHLORIDE 1 MG/ML
1 INJECTION INTRAMUSCULAR; INTRAVENOUS EVERY 5 MIN PRN
Status: DISCONTINUED | OUTPATIENT
Start: 2024-05-23 | End: 2024-05-23 | Stop reason: HOSPADM

## 2024-05-23 RX ORDER — PHENYLEPHRINE HCL 10 MG/ML
VIAL (ML) INJECTION AS NEEDED
Status: DISCONTINUED | OUTPATIENT
Start: 2024-05-23 | End: 2024-05-23 | Stop reason: SURG

## 2024-05-23 RX ORDER — HYDROMORPHONE HYDROCHLORIDE 1 MG/ML
0.2 INJECTION, SOLUTION INTRAMUSCULAR; INTRAVENOUS; SUBCUTANEOUS EVERY 2 HOUR PRN
Status: DISCONTINUED | OUTPATIENT
Start: 2024-05-23 | End: 2024-05-28

## 2024-05-23 RX ORDER — HYDROCODONE BITARTRATE AND ACETAMINOPHEN 5; 325 MG/1; MG/1
2 TABLET ORAL ONCE AS NEEDED
Status: DISCONTINUED | OUTPATIENT
Start: 2024-05-23 | End: 2024-05-23 | Stop reason: HOSPADM

## 2024-05-23 RX ORDER — KETOROLAC TROMETHAMINE 30 MG/ML
INJECTION, SOLUTION INTRAMUSCULAR; INTRAVENOUS
Status: COMPLETED
Start: 2024-05-23 | End: 2024-05-23

## 2024-05-23 RX ORDER — BISACODYL 10 MG
10 SUPPOSITORY, RECTAL RECTAL
Status: DISCONTINUED | OUTPATIENT
Start: 2024-05-23 | End: 2024-05-28

## 2024-05-23 RX ORDER — METOCLOPRAMIDE HYDROCHLORIDE 5 MG/ML
10 INJECTION INTRAMUSCULAR; INTRAVENOUS EVERY 8 HOURS PRN
Status: DISCONTINUED | OUTPATIENT
Start: 2024-05-23 | End: 2024-05-23 | Stop reason: HOSPADM

## 2024-05-23 RX ORDER — DEXAMETHASONE SODIUM PHOSPHATE 4 MG/ML
VIAL (ML) INJECTION AS NEEDED
Status: DISCONTINUED | OUTPATIENT
Start: 2024-05-23 | End: 2024-05-23 | Stop reason: SURG

## 2024-05-23 RX ADMIN — LABETALOL HYDROCHLORIDE 5 MG: 5 INJECTION, SOLUTION INTRAVENOUS at 12:45:00

## 2024-05-23 RX ADMIN — DEXAMETHASONE SODIUM PHOSPHATE 4 MG: 4 MG/ML VIAL (ML) INJECTION at 11:55:00

## 2024-05-23 RX ADMIN — LIDOCAINE HYDROCHLORIDE 5 ML: 10 INJECTION, SOLUTION EPIDURAL; INFILTRATION; INTRACAUDAL; PERINEURAL at 11:53:00

## 2024-05-23 RX ADMIN — NEOSTIGMINE METHYLSULFATE 3 MG: 1 INJECTION, SOLUTION INTRAVENOUS at 13:07:00

## 2024-05-23 RX ADMIN — METRONIDAZOLE 500 MG: 500 INJECTION, SOLUTION INTRAVENOUS at 12:00:00

## 2024-05-23 RX ADMIN — ROCURONIUM BROMIDE 40 MG: 10 INJECTION, SOLUTION INTRAVENOUS at 11:53:00

## 2024-05-23 RX ADMIN — GLYCOPYRROLATE 0.4 MG: 0.2 INJECTION, SOLUTION INTRAMUSCULAR; INTRAVENOUS at 13:07:00

## 2024-05-23 RX ADMIN — PHENYLEPHRINE HCL 100 MCG: 10 MG/ML VIAL (ML) INJECTION at 11:58:00

## 2024-05-23 RX ADMIN — ONDANSETRON 4 MG: 2 INJECTION INTRAMUSCULAR; INTRAVENOUS at 12:06:00

## 2024-05-23 RX ADMIN — PHENYLEPHRINE HCL 100 MCG: 10 MG/ML VIAL (ML) INJECTION at 12:03:00

## 2024-05-23 RX ADMIN — SODIUM CHLORIDE, SODIUM LACTATE, POTASSIUM CHLORIDE, CALCIUM CHLORIDE: 600; 310; 30; 20 INJECTION, SOLUTION INTRAVENOUS at 13:12:00

## 2024-05-23 RX ADMIN — SODIUM CHLORIDE, SODIUM LACTATE, POTASSIUM CHLORIDE, CALCIUM CHLORIDE: 600; 310; 30; 20 INJECTION, SOLUTION INTRAVENOUS at 11:46:00

## 2024-05-23 NOTE — PROGRESS NOTES
ONI Hospitalist Progress Note                                                                     [facility]   part of [facility]      Merissa Oshea  5/12/1950    SUBJECTIVE: no chest pain, palpitations, shortness of breath, cough, nausea, vomiting. Patient with post operative pain which is improved today. Pt with flatus but no BM    OBJECTIVE:  Temp:  [98.3 °F (36.8 °C)-99.8 °F (37.7 °C)] 98.9 °F (37.2 °C)  Pulse:  [68-83] 68  Resp:  [16-18] 16  BP: ()/(49-69) 93/49  SpO2:  [96 %-100 %] 97 %  Exam  Gen: No acute distress, alert and oriented   Pulm: Lungs clear bilaterally, normal respiratory effort, no crackles, no wheezing  CV: Heart with regular rate and rhythm, no murmur.   Abd: Abdomen soft, post surgical tenderness more left then right, nondistended, bowel sounds present-hypoactive but better  MSK: no pitting edema or tenderness of the LE  Skin: no new rashes or lesions    Labs:   Recent Labs   Lab 05/21/24  1134 05/22/24  0536 05/23/24  0916   WBC 10.0 6.4 8.5   HGB 12.5 12.2 10.4*   MCV 83.4 86.5 85.3   .0 168.0 180.0       Recent Labs   Lab 05/21/24  1134 05/22/24  0536 05/23/24  0453    139 142   K 3.9 3.7 3.5    105 108   CO2 30.0 29.0 28.0   BUN 9 8* 9   CREATSERUM 0.75 0.70 0.62   CA 8.0* 7.7* 8.0*   MG  --   --  1.9   * 118* 101*       No results for input(s): \"ALT\", \"AST\", \"ALB\", \"AMYLASE\", \"LIPASE\", \"LDH\" in the last 168 hours.    Invalid input(s): \"ALPHOS\", \"TBIL\", \"DBIL\", \"TPROT\"    No results for input(s): \"PGLU\" in the last 168 hours.    Meds:   Scheduled:    piperacillin-tazobactam  3.375 g Intravenous Q8H     Continuous Infusions:    sodium chloride 100 mL/hr at 05/23/24 0258     PRN:   [Transfer Hold] bisacodyl    [Transfer Hold] simethicone    [Transfer Hold] ondansetron    [Transfer Hold] acetaminophen    [Transfer Hold] ketorolac    [Transfer Hold] metoclopramide    [Transfer Hold] oxyCODONE  **OR** [Transfer Hold] oxyCODONE    [Transfer Hold] HYDROmorphone **OR** [Transfer Hold] HYDROmorphone    ASSESSMENT / PLAN:   74 year old female with no significant past medical history presenting with planned surgical intervention for recent acute appendicitis with perforation/abscess.      Acute Appendicitis/abscess  -pt admitted early April with this, received 2 drains and dc on iv abx --> completed course prior to admission  -POD # 3 s/p diagnostic laparoscopy, open appendectomy, extensive lysis of adhesions  -surgery following  -npo, diet per surgery  -iv zosyn     Acute post Operative pain  -as per surgery  -iv hydromorphone prn  -po oxy prn     Quality:  DVT Prophylaxis: scd  CODE status: Full per chart  Pike:      Plan of care discussed with patient and staff     Dispo: no discharge     Sam Villegas MD  [facility] Hospitalist  [phone]

## 2024-05-23 NOTE — ANESTHESIA PREPROCEDURE EVALUATION
PRE-OP EVALUATION    Patient Name: Merissa Oshea    Admit Diagnosis: PERFORATED APPENDICITIS WITH ABSCESS  Postoperative pain    Pre-op Diagnosis: Bowel perforation (HCC) [K63.1]    EXPLORATORY LAPAROTOMY POSSIBLE BOWEL RESECTION    Anesthesia Procedure: EXPLORATORY LAPAROTOMY POSSIBLE BOWEL RESECTION (Abdomen)    Surgeons and Role:     * Jovanni Monsivais MD - Primary    Pre-op vitals reviewed.  Temp: 98.9 °F (37.2 °C)  Pulse: 68  Resp: 16  BP: 93/49  SpO2: 97 %  Body mass index is 18.02 kg/m².    Current medications reviewed.  Hospital Medications:  • [Transfer Hold] bisacodyl (Dulcolax) 10 MG rectal suppository 10 mg  10 mg Rectal Daily PRN   • sodium chloride 0.9% infusion   Intravenous Continuous   • [Transfer Hold] simethicone (Mylicon) chewable tab 80 mg  80 mg Oral QID PRN   • [Transfer Hold] ondansetron (Zofran) 4 MG/2ML injection 4 mg  4 mg Intravenous Q6H PRN   • [Transfer Hold] acetaminophen (Ofirmev) 10 mg/mL infusion premix 700 mg  15 mg/kg Intravenous Q6H PRN   • [Transfer Hold] ketorolac (Toradol) 15 MG/ML injection 15 mg  15 mg Intravenous Q6H PRN   • [Transfer Hold] metoclopramide (Reglan) solution 10 mg  10 mg Oral Q6H PRN   • [COMPLETED] metoclopramide (Reglan) 5 mg/mL injection       • [COMPLETED] sodium chloride 0.9 % IV bolus 500 mL  500 mL Intravenous Once   • [Transfer Hold] oxyCODONE immediate release tab 2.5 mg  2.5 mg Oral Q4H PRN    Or   • [Transfer Hold] oxyCODONE immediate release tab 5 mg  5 mg Oral Q4H PRN   • [Transfer Hold] HYDROmorphone (Dilaudid) 1 MG/ML injection 0.2 mg  0.2 mg Intravenous Q2H PRN    Or   • [Transfer Hold] HYDROmorphone (Dilaudid) 1 MG/ML injection 0.4 mg  0.4 mg Intravenous Q2H PRN   • [COMPLETED] HYDROmorphone (Dilaudid) 1 MG/ML injection       • piperacillin-tazobactam (Zosyn) 3.375 g in dextrose 5% 100 mL IVPB-ADDV  3.375 g Intravenous Q8H       Outpatient Medications:     Medications Prior to Admission   Medication Sig Dispense Refill Last Dose   •  [] meropenem 1 g Intravenous Recon Soln Inject 1 g into the vein every 8 (eight) hours for 20 days. PICC care per protocol. Please draw weekly CBC with diff, CMP and CRP for duration of abx therapy and fax results to [facility] at [phone]. Flushes per [facility] protocol.    If sending patient to [facility], please fax order to [phone], then call [phone] and select option #1 for scheduling. 60 g 0 2024 at 1900       Allergies: Patient has no known allergies.      Anesthesia Evaluation    Patient summary reviewed.    Anesthetic Complications  (-) history of anesthetic complications         GI/Hepatic/Renal  Comment: Bowel perforation                                Cardiovascular        Exercise tolerance: good     MET: >4                   (+) valvular problems/murmurs and MVP                       Endo/Other                                  Pulmonary                           Neuro/Psych      (+) depression           (+) neuromuscular disease                     Past Surgical History:   Procedure Laterality Date   • Arthroscopy of joint unlisted Right 2018   •      • Colonoscopy,diagnostic      Dr. Seo   • Colonoscopy,diagnostic  2018    hepatic flexure polyp   • Egd N/A 2018    Procedure: ESOPHAGOGASTRODUODENOSCOPY, COLONOSCOPY, POSSIBLE BIOPSY, POSSIBLE POLYPECTOMY 05077, 63904;  Surgeon: Yan Quiñones MD;  Location: [facility]   • Enlarge breast with implant Bilateral 2001   • Implant left     • Implant right     • Other surgical history      abdominoplasty   • Other surgical history      explant of breast implants   • Other surgical history Left     neuroma removal of foot   • Rev lower eyelid exten fat pad  10/14/2008    Performed by LISA FERRARA at [facility]   • Rev lower eyelid exten fat pad  10/14/2008    Performed by LISA FERRARA at [facility]   • Rev upper eyelid w excess  skin  10/14/2008    Performed by L[facility] [facility] [place] [employer]   • Rev upper eyelid w excess skin  10/14/2008    Performed by LISA FERRARA at [facility]   • Shoulder arthroscopy Right 06/2019    manipulation   • Upper gi endoscopy,diagnosis  02/28/2018    hiatal hernia, gastric and SB Bx taken     Social History     Socioeconomic History   • Marital status:    Tobacco Use   • Smoking status: Never   • Smokeless tobacco: Never   Vaping Use   • Vaping status: Never Used   Substance and Sexual Activity   • Alcohol use: Yes     Comment: socially   • Drug use: No   • Sexual activity: Not Currently     Partners: Male     History   Drug Use No     Available pre-op labs reviewed.  Lab Results   Component Value Date    WBC 8.5 05/23/2024    RBC 3.54 (L) 05/23/2024    HGB 10.4 (L) 05/23/2024    HCT 30.2 (L) 05/23/2024    MCV 85.3 05/23/2024    MCH 29.4 05/23/2024    MCHC 34.4 05/23/2024    RDW 13.0 05/23/2024    .0 05/23/2024     Lab Results   Component Value Date     05/23/2024    K 3.5 05/23/2024     05/23/2024    CO2 28.0 05/23/2024    BUN 9 05/23/2024    CREATSERUM 0.62 05/23/2024     (H) 05/23/2024    CA 8.0 (L) 05/23/2024            Airway      Mallampati: I  Mouth opening: >3 FB  TM distance: > 6 cm   Cardiovascular    Cardiovascular exam normal.         Dental  Comment: Denies chipped or loose teeth. Discussed risk of dental injury with anesthesia cole to weakened teeth.            Pulmonary    Pulmonary exam normal.                 Other findings        ASA: 3   Plan: general  NPO status verified and patient meets guidelines.          Plan/risks discussed with: patient            Present on Admission:  **None**

## 2024-05-23 NOTE — PROGRESS NOTES
Pt reports moderate abdominal soreness this morning. Toradol administered. Abdomen is flat and soft but tender to palpation on the left side. Reports passing flatus x1 yesterday. Suppository administered. NPO with ice chips. Midline incision with staples well approximated and no drainage. Abd removed and CHG wipes used to cleanse abdomen. Abd placed over incision per pts request. TYREL to left abdomen with split gauze CDI. TYREL draining serosanguinous output. Output saved in specimen cup for Dr. Monsivais. IVF infusing. Pt ambulatory. Voiding without difficulty. Plan of care reviewed with pt. All questions addressed.

## 2024-05-23 NOTE — PROGRESS NOTES
[facility]  Progress Note    Merissa Oshea Patient Status:  Inpatient    1950 MRN [id number]   Location [facility] 3NW-A Attending Jovanni Monsivais MD   Hosp Day # 2 PCP Cruz Puckett MD     Subjective:    Patient reports her pain is mildly improved, she passed gas x 1, feels like she may have BM today  DEREK output 430cc/24 hr    Objective/Physical Exam:    Vital Signs:  Blood pressure 93/49, pulse 68, temperature 98.9 °F (37.2 °C), temperature source Oral, resp. rate 16, height 5' 4\" (1.626 m), weight 105 lb (47.6 kg), SpO2 97%, not currently breastfeeding.    General:  Alert, orientated x3.  Cooperative.  No apparent distress.  Abdomen:  incisions intact, softer, tender on left abdomen with guarding  DEREK clear though more brown tinged today-concern for bile     Labs:  Reviewed    Lab Results   Component Value Date    CREATSERUM 0.62 2024    BUN 9 2024     2024    K 3.5 2024     2024    CO2 28.0 2024     2024    CA 8.0 2024    MG 1.9 2024       Problem List:  Patient Active Problem List   Diagnosis    Lumbar stenosis    Decreased strength of upper extremity    Plantar neuroma of left foot    Capsular contracture of breast implant    Depressive disorder    Hearing loss    Martienz's metatarsalgia    Bradycardia    Diarrhea of presumed infectious origin    Acute appendicitis with perforation, generalized peritonitis, and abscess, unspecified whether gangrene present    Dehydration    Hypokalemia    Hyponatremia    Postoperative pain       Impression:     73 y/o S/P  Diagnostic laparoscopy, open appendectomy, extensive lysis of adhesions.   - afebrile, pain improved, softer with moderate pain left abdomen  Derek concern for bile     Plan:    NPO  IV fluids  IV antibiotics  Encourage ambulation/IS  suppository  Reviewed with patient Dr Monsivais to see patient this morning  CHIN Santillan, PA  [facility]  [facility]  General Surgery  5/23/2024

## 2024-05-23 NOTE — PROGRESS NOTES
Pt returned to unit after procedure in stable condition. NGT to LIS. TYREL to right abdomen with serosanguinous drainage in bulb. Aquacel dressing to midline incision CDI. Old TYREL site to left abdomen with gauze and mepilex CDI. Pike draining yellow urine. Plan of care reviewed with pt and her daughter at the bedside. All questions addressed.

## 2024-05-23 NOTE — OPERATIVE REPORT
[facility]    PATIENT'S NAME: ANNIE SAPP   ATTENDING PHYSICIAN: Jovanni Monsivais M.D.   OPERATING PHYSICIAN: Jovanni Monsivais M.D.   PATIENT ACCOUNT#:   [id number]    LOCATION:  [address] [place] [address]  MEDICAL RECORD #:   [id number]       YOB: 1950  ADMISSION DATE:       05/20/2024      OPERATION DATE:  05/23/2024    OPERATIVE REPORT      PREOPERATIVE DIAGNOSIS:  Bowel perforation.  POSTOPERATIVE DIAGNOSIS:  Bowel perforation.  PROCEDURE:  Exploratory laparotomy, lysis of adhesions, small-bowel resection.    ASSISTANT:  Taylor Santillan PA-C.  She assisted by prepping, draping, retracting throughout the procedure.    ANESTHESIA:  General.    OPERATIVE TECHNIQUE:  The patient was brought in the operating room, placed on the operating table in supine position.  Inhalational anesthesia provided by the attending anesthesiologist.  The abdomen was prepped in the usual sterile fashion.  Lidocaine 1% and 0.5%  Marcaine were used as a local anesthetic.  I removed the staples from the recent surgery, cut the PDS sutures to enter into the abdomen.  The patient's abdominal wall was rather stiff from her previous abdominoplasty.  I extended the incision above the umbilicus to give us a better view of her entire abdomen.  She did have gross bilious drainage in her abdomen.  We identified 2 areas of bowel perforation.  One was immediately adjacent to the area I had repaired previously, one was several centimeters away from the area.  Fortunately, this area of bowel injury was confined to a single segment of small bowel measuring about 6 inches in length.  We did an entire exploration of her small bowel, irrigated the abdomen, and we had taken cultures of the fluid and after inspecting the entire length of the small bowel, this was the only area of abnormality.  I transected the bowel with a KEANU stapler proximal and distal to this area, divided the mesentery with the LigaSure.  I then performed a functional  end-to-end anastomosis with a KEANU and TA stapler and then oversewed the staple line with interrupted silk sutures.  I then closed the mesenteric defect with a Vicryl suture.  We again irrigated, inspected the bowel.  The anastomosis appeared intact and patent.  There was no bleeding and no signs of any other bowel injury.  We had removed the previous TYREL drain.  I placed a new drain through a separate stab incision, secured it with a silk suture.  We closed the abdomen with a running looped PDS, the skin with a skin stapler.  Sterile dressing was provided.  The patient tolerated the procedure well.  She was taken to the recovery room for observation.    Dictated By Jovanni Monsivais M.D.  d: 05/23/2024 13:20:34  t: 05/23/2024 17:05:48  [facility] [id number]  LCM/

## 2024-05-23 NOTE — DISCHARGE INSTRUCTIONS
Resume services with [facility]  [phone]  PICC dressing change completed 5/28. Next dressing change due June 4th  Weekly CBC with differential and CMP. Fax results to [facility]. Fax: [phone]. Tel: [phone].       [facility]   Ph: [phone], fax: [phone]

## 2024-05-23 NOTE — DIETARY NOTE
[facility]   part of [facility]    NUTRITION ASSESSMENT    Pt does not meet malnutrition criteria at this time.      NUTRITION INTERVENTION:    Meal and Snacks - Monitor and encourage adequate PO intake.       PATIENT STATUS:5/23- Pt taken to OR for ex lap due to possible bowel perforation. NPO now since admission. ONS once diet advances. RD to follow for diet advancement  05/21/24 74 year old female presented 5/20 for planned surgical intervention for recent acute appendicitis with perforation/abscess. Patient was admitted a few weeks ago with a diagnosis of acute appendicitis with abscess formation.  Patient had 2 drains placed and was placed on IV antibiotics and discharged home.  Now s/p diagnostic laparoscopy, open appendectomy, extensive lysis of adhesions. Chart reviewed for +MST/low BMI. Address recent wt loss, pt states \"I just finished a bowel prep\". Per EPIC, pt is down 13#(11%) x 1 month-significant per standards. Pt is a vegetarian. Does report decreased PO with recent appendicitis. Declined ONS. Pt states she found items on the menu okay at last visit. Currently NPO. RD to follow      ANTHROPOMETRICS:  Ht: 162.6 cm (5' 4\")  Wt: 47.6 kg (105 lb).   BMI: Body mass index is 18.02 kg/m².  IBW: 54 kg      WEIGHT HISTORY:   Weight loss: Yes, 13# (11%) x 1 month    Wt Readings from Last 10 Encounters:   05/21/24 47.6 kg (105 lb)   04/19/24 53.5 kg (118 lb)   09/24/23 53.5 kg (118 lb)   03/05/23 53.5 kg (118 lb)   01/04/23 53.5 kg (118 lb)   01/31/22 54.4 kg (120 lb)   06/08/21 54.9 kg (121 lb)   12/31/20 55.8 kg (123 lb)   12/10/19 55.8 kg (123 lb)   07/11/19 55.8 kg (123 lb)        NUTRITION:  Diet:       Procedures    NPO      Food Allergies: No  Cultural/Ethnic/Cheondoism Preferences Addressed: Yes    Percent Meals Eaten (last 3 days)       Date/Time Percent Meals Eaten (%)    05/22/24 1302 100 %    05/22/24 2132 50 %    05/23/24 0930 0 %            GI system review: WNL Last BM: PTA  Skin and  wounds: intact    NUTRITION RELATED PHYSICAL FINDINGS:     1. Body Fat/Muscle Mass: no wasting noted     2. Fluid Accumulation: none per RN documentation     NUTRITION PRESCRIPTION: 47.6kg  Calories:  5858-8107 calories/day (30-35 kcal/kg)  Protein: 56-70 grams protein/day (1.2-1.5 grams protein per kg)  Fluid: ~1 ml/kcal or per MD discretion    NUTRITION DIAGNOSIS/PROBLEM:  Underweight related to decreased intake as evidenced by documented/reported unintentional weight loss and low BMI      MONITOR AND EVALUATE/NUTRITION GOALS:  PO intake of 75% of meals TID - Not met, Continues  Weight stable within 1 to 2 lbs during admission - Ongoing  Start alternative nutrition in 24-48 hrs if diet is not able to advance- New      MEDICATIONS:  Reviewed    LABS:  Reviewed    Pt is at Moderate nutrition risk    Sowmya Jones RD, LDN  Clinical Nutrition  [id number]           Staged Advancement Flap Text: The defect edges were debeveled with a #15 scalpel blade.  Given the location of the defect, shape of the defect and the proximity to free margins a staged advancement flap was deemed most appropriate.  Using a sterile surgical marker, an appropriate advancement flap was drawn incorporating the defect and placing the expected incisions within the relaxed skin tension lines where possible. The area thus outlined was incised deep to adipose tissue with a #15 scalpel blade.  The skin margins were undermined to an appropriate distance in all directions utilizing iris scissors.

## 2024-05-23 NOTE — CM/SW NOTE
RYLEE was informed by [facility] liaison, esa Rosen pt is active with [facility] for RN services. RYLEE sent LATRICE order for RN services to [facility] via [employer]. Will update order if PT/OT is needed as well.     to remain available for support and/or discharge planning.    MONSTER Guaman  Discharge Planner  [phone]

## 2024-05-23 NOTE — PLAN OF CARE
Pt VSS, AOx4. Pt midline incision c/d/staples intact; abd pad over for comfort. TYREL draining moderate amounts of serosanguinous drainage.  Pt reports pain 3 out of 10 in abdomen; alternating IV Tylenol & toradol for pain management (per orders). NPO + ice/popsicles continued. Pt bowel sounds present, she is belching & passing a small amount of flatus.  No BM this shift. Voids in adequate amounts without difficulty. Up with standby, encouraged to ambulate often and as tolerated. Pt on room air; denies RASHMI/SOB/lightheadedness. Achieves approx 1000 ml of incentive spirometer. IV fluids continued per orders, between ABX. SCDs on this evening. Fall & safety precautions in place. POC discussed.

## 2024-05-23 NOTE — ANESTHESIA POSTPROCEDURE EVALUATION
[facility]    Merissa Oshea Patient Status:  Inpatient   Age/Gender 74 year old female MRN [id number]   Location [facility] SURGERY Attending Jovanni Monsivais MD   Hosp Day # 2 PCP Cruz Puckett MD       Anesthesia Post-op Note    EXPLORATORY LAPAROTOMY, SMALL BOWEL RESECTION    Procedure Summary       Date: 05/23/24 Room / Location:  MAIN OR 06 / [facility] MAIN OR    Anesthesia Start: 1146 Anesthesia Stop: 1318    Procedure: EXPLORATORY LAPAROTOMY, SMALL BOWEL RESECTION (Abdomen) Diagnosis:       Bowel perforation (HCC)      (Bowel perforation (HCC) [K63.1])    Surgeons: Jovanni Monsivais MD Anesthesiologist: Dimple Mistry MD    Anesthesia Type: general ASA Status: 3            Anesthesia Type: general    Vitals Value Taken Time   /82 05/23/24 1319   Temp 98.9 05/23/24 1319   Pulse 77 05/23/24 1319   Resp 18 05/23/24 1319   SpO2 100 05/23/24 1319       Patient Location: PACU    Anesthesia Type: general    Airway Patency: patent and extubated    Postop Pain Control: adequate    Mental Status: preanesthetic baseline    Nausea/Vomiting: none    Cardiopulmonary/Hydration status: stable euvolemic    Complications: no apparent anesthesia related complications    Postop vital signs: stable    Dental Exam: Unchanged from Preop    Patient to be discharged from PACU when criteria met.

## 2024-05-24 LAB
ALBUMIN SERPL-MCNC: 1.5 G/DL (ref 3.4–5)
ALBUMIN/GLOB SERPL: 0.6 {RATIO} (ref 1–2)
ALP LIVER SERPL-CCNC: 65 U/L
ALT SERPL-CCNC: 12 U/L
ANION GAP SERPL CALC-SCNC: 3 MMOL/L (ref 0–18)
AST SERPL-CCNC: 9 U/L (ref 15–37)
BASOPHILS # BLD AUTO: 0.03 X10(3) UL (ref 0–0.2)
BASOPHILS NFR BLD AUTO: 0.5 %
BILIRUB SERPL-MCNC: 0.5 MG/DL (ref 0.1–2)
BUN BLD-MCNC: 10 MG/DL (ref 9–23)
CALCIUM BLD-MCNC: 7.1 MG/DL (ref 8.5–10.1)
CHLORIDE SERPL-SCNC: 109 MMOL/L (ref 98–112)
CO2 SERPL-SCNC: 27 MMOL/L (ref 21–32)
CREAT BLD-MCNC: 0.68 MG/DL
EGFRCR SERPLBLD CKD-EPI 2021: 92 ML/MIN/1.73M2 (ref 60–?)
EOSINOPHIL # BLD AUTO: 0.22 X10(3) UL (ref 0–0.7)
EOSINOPHIL NFR BLD AUTO: 4 %
ERYTHROCYTE [DISTWIDTH] IN BLOOD BY AUTOMATED COUNT: 13.1 %
GLOBULIN PLAS-MCNC: 2.7 G/DL (ref 2.8–4.4)
GLUCOSE BLD-MCNC: 82 MG/DL (ref 70–99)
GLUCOSE BLD-MCNC: 87 MG/DL (ref 70–99)
HCT VFR BLD AUTO: 28.3 %
HGB BLD-MCNC: 9 G/DL
IMM GRANULOCYTES # BLD AUTO: 0.03 X10(3) UL (ref 0–1)
IMM GRANULOCYTES NFR BLD: 0.5 %
LYMPHOCYTES # BLD AUTO: 1.03 X10(3) UL (ref 1–4)
LYMPHOCYTES NFR BLD AUTO: 18.7 %
MAGNESIUM SERPL-MCNC: 1.5 MG/DL (ref 1.6–2.6)
MCH RBC QN AUTO: 28.3 PG (ref 26–34)
MCHC RBC AUTO-ENTMCNC: 31.8 G/DL (ref 31–37)
MCV RBC AUTO: 89 FL
MONOCYTES # BLD AUTO: 0.43 X10(3) UL (ref 0.1–1)
MONOCYTES NFR BLD AUTO: 7.8 %
NEUTROPHILS # BLD AUTO: 3.78 X10 (3) UL (ref 1.5–7.7)
NEUTROPHILS # BLD AUTO: 3.78 X10(3) UL (ref 1.5–7.7)
NEUTROPHILS NFR BLD AUTO: 68.5 %
OSMOLALITY SERPL CALC.SUM OF ELEC: 286 MOSM/KG (ref 275–295)
PHOSPHATE SERPL-MCNC: 2.8 MG/DL (ref 2.5–4.9)
PLATELET # BLD AUTO: 202 10(3)UL (ref 150–450)
POTASSIUM SERPL-SCNC: 3.3 MMOL/L (ref 3.5–5.1)
PROT SERPL-MCNC: 4.2 G/DL (ref 6.4–8.2)
RBC # BLD AUTO: 3.18 X10(6)UL
SODIUM SERPL-SCNC: 139 MMOL/L (ref 136–145)
WBC # BLD AUTO: 5.5 X10(3) UL (ref 4–11)

## 2024-05-24 PROCEDURE — 80053 COMPREHEN METABOLIC PANEL: CPT | Performed by: HOSPITALIST

## 2024-05-24 PROCEDURE — 84100 ASSAY OF PHOSPHORUS: CPT | Performed by: PHYSICIAN ASSISTANT

## 2024-05-24 PROCEDURE — 83735 ASSAY OF MAGNESIUM: CPT | Performed by: HOSPITALIST

## 2024-05-24 PROCEDURE — 97161 PT EVAL LOW COMPLEX 20 MIN: CPT

## 2024-05-24 PROCEDURE — 82962 GLUCOSE BLOOD TEST: CPT

## 2024-05-24 PROCEDURE — 85025 COMPLETE CBC W/AUTO DIFF WBC: CPT | Performed by: HOSPITALIST

## 2024-05-24 PROCEDURE — 97116 GAIT TRAINING THERAPY: CPT

## 2024-05-24 PROCEDURE — S0028 INJECTION, FAMOTIDINE, 20 MG: HCPCS | Performed by: PHYSICIAN ASSISTANT

## 2024-05-24 NOTE — PROGRESS NOTES
Patient A&Ox4; ambulating independently in hallway and tolerating well. Vital signs stable on room air. Pain 8/10; toradol given. 4/10 upon reassessment. Abdomen soft, nondistended, tender. Bowel sounds present; hypoactive. Patient not passing gas. Denies nausea, vomiting, diarrhea. Midline incision dressed with aquacel; dressing clean, dry and intact with no evidence of drainage. TYREL drain intact with small amount of serosanguineous output to bulc suction. Left lower quadrant wound with no output, dressing changed to bandaid. NG tube intact with green output to low intermittent suction. Ice chips being tolerated well.     1700: Patient passing gas and small bowel movement. MD made aware. NG Tube clamp trial started. Order for clamp trial for six hours. Pain 2/10 at this time. Patient up and ambulating independently in hallway. Updated on plan of care. Questions and concerns discussed.

## 2024-05-24 NOTE — PLAN OF CARE
Pt VSS, AOx4. Pt midline incision c/d/Aquacel remains in place with no shadowing noted. TYREL draining small to moderate amounts of serosanguinous drainage. Gauze over TYREL insertion site c/d/I. Old TYREL puncture site remains covered with meplex; very small amounts green/brown drainage noted. Pt reports pain at this time of 3-4 out of 10; alternating IV Tylenol, dilaudid & toradol for pain management (per orders). NPO, strict. Pt bowel sounds hypoactive, denies flatus, No BM since prior to admission. NGT taped at 58 cm, cris green drainage in moderate amounts noted in cannister. NG remains connected to low intermittent suction. Pike continued, output on lower side (290 ml since post op). MD Hospitalist paged and aware; 500 ml bolus ordered and completed. Up with standby, encouraged to ambulate often and as tolerated. Pt on room air; denies RASHMI/SOB/lightheadedness. Achieves approx 700 ml of incentive spirometer. IV fluids continued per orders, ABX continued. SCDs continued. Fall & safety precautions in place. POC discussed.

## 2024-05-24 NOTE — PHYSICAL THERAPY NOTE
PHYSICAL THERAPY EVALUATION - INPATIENT     Room Number: 329/329-A  Evaluation Date: 5/24/2024  Type of Evaluation: Initial  Physician Order: PT Eval and Treat    Presenting Problem: exploratory lap x2  Co-Morbidities : lumbar stenosis, depression, hearing loss  Reason for Therapy: Mobility Dysfunction and Discharge Planning    Procedure Performed: 5/20/24  ATTEMPTED LAPARASCOPIC APPENDECTOMY, TURNED OPEN APPENDECTOMY  Lysis of adhesions    PROCEDURE: Exploratory laparotomy, lysis of adhesions, small-bowel resection 5/23/24    PHYSICAL THERAPY ASSESSMENT   Patient is a 74 year old female admitted 5/20/2024 for right side abdominal pain following abdominal surgery (5/14/24).   Patient is currently functioning at baseline with bed mobility, transfers, gait, and stair negotiation. Prior to admission, patient's baseline is independent.       PLAN  Patient has been evaluated and presents with no skilled Physical Therapy needs at this time.  Patient discharged from Physical Therapy services.  Please re-order if a new functional limitation presents during this admission.    GOALS  Patient was able to achieve the following goals ...    Patient was able to transfer At previous, functional level   Patient able to ambulate on level surfaces At previous, functional level         HOME SITUATION  Type of Home: House   Home Layout: Two level  Stairs to Enter : 1  Railing: No  Stairs to Bedroom: 12  Railing: Yes    Lives With: Alone  Drives: Yes  Patient Owned Equipment: None       Prior Level of [place]: Pt reports being fully independent with all mobility and ADLs. Pt reports that she runs regularly and was supposed to complete a half marathon this May. Pt drives and lives alone and has no concerns about safely going home.    SUBJECTIVE  \"If it's gonna make me fart I'm doing it.\"      OBJECTIVE  Precautions: TYREL tube;NG suction  Fall Risk: Standard fall risk    WEIGHT BEARING RESTRICTION  Weight Bearing Restriction:  None                PAIN ASSESSMENT  Ratin  Location: abdominal surgical incision  Management Techniques: Activity promotion;Body mechanics;Breathing techniques    COGNITION  Following Commands:  follows all commands and directions without difficulty    RANGE OF MOTION AND STRENGTH ASSESSMENT  Upper extremity ROM and strength are within functional limits     Lower extremity ROM is within functional limits     Lower extremity strength is within functional limits       BALANCE  Static Sitting: Normal  Dynamic Sitting: Normal  Static Standing: Good  Dynamic Standing: Good    ADDITIONAL TESTS                                    ACTIVITY TOLERANCE                         O2 WALK       NEUROLOGICAL FINDINGS                        AM-PAC '6-Clicks' INPATIENT SHORT FORM - BASIC MOBILITY  How much difficulty does the patient currently have...  Patient Difficulty: Turning over in bed (including adjusting bedclothes, sheets and blankets)?: None   Patient Difficulty: Sitting down on and standing up from a chair with arms (e.g., wheelchair, bedside commode, etc.): None   Patient Difficulty: Moving from lying on back to sitting on the side of the bed?: None   How much help from another person does the patient currently need...   Help from Another: Moving to and from a bed to a chair (including a wheelchair)?: None   Help from Another: Need to walk in hospital room?: None   Help from Another: Climbing 3-5 steps with a railing?: None       AM-PAC Score:  Raw Score: 24   Approx Degree of Impairment: 0%   Standardized Score (AM-PAC Scale): 61.14   CMS Modifier (G-Code): CH    FUNCTIONAL ABILITY STATUS  Gait Assessment   Functional Mobility/Gait Assessment  Gait Assistance: Modified independent  Distance (ft): 500  Assistive Device: None (Pt held on to IV pole while walking)  Pattern: Within Functional Limits  Stairs: Stairs  How Many Stairs: 6  Device: 1 Rail  Assist: Supervision  Pattern: Ascend and Descend  Ascend and Descend :  Reciprocal    Skilled Therapy Provided     Bed Mobility:  Rolling: ind  Supine to sit: ind   Sit to supine: ind     Transfer Mobility:  Sit to stand: ind   Stand to sit: ind  Gait = modified independent. Pt held onto IV pole to walk.    Therapist's comments:RN gave clearance for pt to come off NG suction. Pt received up in the chair. She reports having no concerns about her mobility once discharged home. She is very aware of the benefits of walking and is allowed to walk without RN supervision.    Exercise/Education Provided:  Bed mobility  Body mechanics  Energy conservation  Gait training  Transfer training    Patient End of Session: Up in chair;Needs met;Call light within reach;RN aware of session/findings;All patient questions and concerns addressed    Patient Evaluation Complexity Level:  History Moderate - 1 or 2 personal factors and/or co-morbidities   Examination of body systems Low - addressing 1-2 elements   Clinical Presentation Low - Stable   Clinical Decision Making Low Complexity       PT Session Time: 15 minutes  Gait Trainin minutes

## 2024-05-24 NOTE — CONSULTS
Infectious Disease Initial Consultation      Date of admission: 5/20/2024  7:14 AM     Date of service: 05/24/24 2:05 PM    Consult requested by: Jovanni Monsivais MD     Reason for consult: Peritonitis    Chief complaint: Abdominal pain    History of present illness: Merissa Oshea is a 74 year old female who is known to our practice.  She had presented earlier to [facility] with abdominal pain and diarrhea with a CT showing perforated appendicitis with abscess.  Underwent IR drainage on 4/20 with cultures growing Bacteroides, para Bacteroides species, strep anginosus, E. coli and Pseudomonas aeruginosa.  At that point, decision was made to treat the patient conservatively and she was discharged on IV meropenem.  She was last seen in the office on 5/6/2024 and was maintained on 5/14.  On 5/8, she underwent a CT abscessogram that showed no residual abscess in her drain around the rectum was removed as an outpatient; however, the drain in the right lower quadrant persisted.  She was admitted on 5/20 for planned surgical intervention.  Initially planned for laparoscopic procedure, ended up with a laparotomy with an appendectomy.  The small bowel appeared to be intact without any abnormalities during the surgery.  There was pus at the tip of the appendix.  On 5/23, bile was noted adjacent to the TYREL drain suggesting bowel perforation.  She was then taken back to the OR on that day, 2 small bowel perforations were noted for which she underwent small bowel resection.  Surgical cultures obtained from 5/23 are showing yeast.  The patient has been maintained on IV Zosyn since admission, IV micafungin was added today.    Review of systems:  All other components of the review of systems are negative, except those described in the history of present illness.     Past Medical History:    COVID    Mitral valve prolapse    Strep throat    Visual impairment    reading glasses     Past Surgical History:   Procedure  Laterality Date    Arthroscopy of joint unlisted Right 2018          Colonoscopy,diagnostic      Dr. Seo    Colonoscopy,diagnostic  2018    hepatic flexure polyp    Egd N/A 2018    Procedure: ESOPHAGOGASTRODUODENOSCOPY, COLONOSCOPY, POSSIBLE BIOPSY, POSSIBLE POLYPECTOMY 96924, 62821;  Surgeon: Yan Quiñones MD;  Location: [facility]    Enlarge breast with implant Bilateral 2001    Implant left      Implant right      Other surgical history      abdominoplasty    Other surgical history      explant of breast implants    Other surgical history Left     neuroma removal of foot    Rev lower eyelid exten fat pad  10/14/2008    Performed by LISA FERRARA at [facility]    Rev lower eyelid exten fat pad  10/14/2008    Performed by LISA FERRARA at [facility]    Rev upper eyelid w excess skin  10/14/2008    Performed by LISA FERRARA at [facility]    Rev upper eyelid w excess skin  10/14/2008    Performed by LISA FERRARA at [facility]    Shoulder arthroscopy Right 2019    manipulation    Upper gi endoscopy,diagnosis  2018    hiatal hernia, gastric and SB Bx taken     Social History     Socioeconomic History    Marital status:    Tobacco Use    Smoking status: Never    Smokeless tobacco: Never   Vaping Use    Vaping status: Never Used   Substance and Sexual Activity    Alcohol use: Yes     Comment: socially    Drug use: No    Sexual activity: Not Currently     Partners: Male     Social Determinants of Health     Financial Resource Strain: Low Risk  (2021)    Received [facility]    Overall Financial Resource Strain (CARDIA)     Difficulty of Paying Living Expenses: Not hard at all   Food Insecurity: No Food Insecurity (2024)    Food Insecurity     Food Insecurity: Never true   Transportation Needs: No Transportation Needs (2024)     Transportation Needs     Lack of Transportation: No   Stress: No Stress Concern Present (6/21/2021)    Received from [facility]    [employer] - Occupational Stress Questionnaire     Feeling of Stress : Not at all   Housing Stability: Low Risk  (5/21/2024)    Housing Stability     Housing Instability: No     Family History   Problem Relation Age of Onset    Cancer Mother         lung/brain/liver    Other (Other) Other         self-MVP    Heart Disorder Father     Hypertension Neg     Lipids Neg     Obesity Neg     Psychiatric Neg      Reviewed, see above    Medications:    potassium chloride    magnesium sulfate    micafungin    [Transfer Hold] bisacodyl    lactated ringers    heparin    benzocaine-menthol    polyethylene glycol (PEG 3350)    sennosides    famotidine **OR** famotidine    magnesium oxide    phenol    acetaminophen    oxyCODONE **OR** oxyCODONE    HYDROmorphone **OR** HYDROmorphone    ketorolac    sodium chloride    [Transfer Hold] simethicone    [Transfer Hold] ondansetron    [Transfer Hold] metoclopramide    piperacillin-tazobactam     Allergies:  No Known Allergies    Physical Exam:  Vitals:    05/24/24 1243   BP: 136/61   Pulse: 65   Resp: 18   Temp: 97.6 °F (36.4 °C)     Vitals signs and nursing note reviewed.   Constitutional:       Appearance: Normal appearance.   HENT:      Head: Normocephalic and atraumatic.      Mouth/Throat: Normal dentition     Mouth: Mucous membranes are moist.   Neck:      Musculoskeletal: Neck supple.   Cardiovascular:      Rate and Rhythm: Normal rate.      Heart sounds: Normal heart sounds. No murmur. No friction rub. No gallop.    Pulmonary:      Effort: Pulmonary effort is normal. No respiratory distress.      Breath sounds: Normal breath sounds. No stridor. No wheezing, rhonchi or rales.   Chest:      Chest wall: No tenderness.   Abdominal:      General: Abdomen is flat. There is no distension.      Palpations:  Abdomen is soft. There is no mass.      Tenderness: There is no tenderness. There is no guarding or rebound.      Hernia: No hernia is present.   Musculoskeletal:      Right lower leg: No edema.      Left lower leg: No edema.   Skin:     General: Skin is warm and dry.   Neurological:      General: No focal deficit present.      Mental Status: Alert and oriented to person, place, and time.     Laboratory data:  I have independently reviewed all lab results; including old microbiological results.  Lab Results   Component Value Date    WBC 5.5 05/24/2024    HGB 9.0 05/24/2024    HCT 28.3 05/24/2024    .0 05/24/2024    CREATSERUM 0.68 05/24/2024    BUN 10 05/24/2024     05/24/2024    K 3.3 05/24/2024     05/24/2024    CO2 27.0 05/24/2024    GLU 87 05/24/2024    CA 7.1 05/24/2024    ALB 1.5 05/24/2024    ALKPHO 65 05/24/2024    BILT 0.5 05/24/2024    TP 4.2 05/24/2024    AST 9 05/24/2024    ALT 12 05/24/2024    MG 1.5 05/24/2024    PHOS 2.8 05/24/2024        Recent Labs   Lab 05/24/24  0459   RBC 3.18*   HGB 9.0*   HCT 28.3*   MCV 89.0   MCH 28.3   MCHC 31.8   RDW 13.1   NEPRELIM 3.78   WBC 5.5   .0       Microbiology data:  Hospital Encounter on 05/20/24   1. Body Fluid Cult Aerobic and Anaerobic     Status: Abnormal (Preliminary result)    Collection Time: 05/23/24 12:08 PM    Specimen: Peritoneal fluid; Body fluid, unspecified   Result Value Ref Range    Body Fluid Smear No WBCs seen (A) N/A    Body Fluid Smear No organisms seen (A) N/A    Body Fluid Smear Gram stain of positive bottle shows: (A) N/A    Body Fluid Smear Yeast (A) N/A     Collected 4/20/2024 11:21       Status: Final result    Specimen Information: Subdiaphragmatic abscess   0 Result Notes  AEROBIC CULTURE RESULT 4+ growth Streptococcus anginosus Abnormal       1+ growth Escherichia coli Abnormal    For susceptibility results see previous culture.   1+ growth Pseudomonas aeruginosa Abnormal                AEROBIC SMEAR 4+ Gram  Negative Rods      4+ Gram Positive Rods      4+ Gram positive cocci in pairs and clusters      4+ WBCs seen              Resulting Agency: [facility] ([facility])     Susceptibility     Pseudomonas aeruginosa     Not Specified    Cefepime 4 Sensitive    Ceftazidime 2 Sensitive    Ciprofloxacin <=1 Sensitive    Levofloxacin 0.5 Sensitive    Meropenem <=1 Sensitive    Piperacillin + Tazobactam <=4 Sensitive    Tobramycin <=1 Sensitive            Collected 4/20/2024 11:21       Status: Final result    Specimen Information: Subdiaphragmatic abscess   0 Result Notes  Anaerobic Culture 4+ growth Bacteroides thetaiotaomicron Abnormal       4+ growth Parabacteroides distasonis Abnormal            Resulting Agency: [facility] ([facility])           Specimen Collected: 04/20/24 11:21 Last Resulted: 04/23/24 11:53       Order Details        View Encounter        Lab and Collection Details        Routing        Result History     View All Conversations on this Encounter        Collected 4/20/2024 11:40       Status: Final result    Specimen Information: Subdiaphragmatic abscess   0 Result Notes  AEROBIC CULTURE RESULT 4+ growth Escherichia coli Abnormal       4+ growth Streptococcus anginosus Abnormal                AEROBIC SMEAR 4+ Gram Negative Rods      4+ Gram Positive Rods      4+ Gram positive cocci in pairs and clusters      1+ WBCs seen              Resulting Agency: [facility] ([facility])     Susceptibility       Escherichia coli     Not Specified    Ampicillin >=32 Resistant    Ampicillin + Sulbactam >=32 Resistant    Cefazolin <=4 Sensitive    Ciprofloxacin <=0.25 Sensitive    Gentamicin <=1 Sensitive    Levofloxacin 1 Sensitive    Meropenem <=0.25 Sensitive    Piperacillin + Tazobactam <=4 Sensitive    Trimethoprim/Sulfa <=20 Sensitive            Impression:  Merissa Oshea is a 74 year old female with     Small bowel perforation in the setting of recent appendectomy  The patient originally presented back in April with  acute appendicitis with abdominal abscess  Status post drainage on 4/20 with cultures then growing Pseudomonas aeruginosa, Bacteroides species, and E. coli along with strep species  Was discharged on IV meropenem  One of the abscesses resolved; however, the other abscess did not  Brought back to the hospital for an appendectomy that was done on 5/20  Taken back to the OR on 5/23 in the setting of a small bowel perforation for small bowel resection  Surgical cultures from 5/23 are growing yeast  Currently on IV Zosyn and IV micafungin    Recommendations:     Continue to follow-up on surgical cultures  Continue IV Zosyn and IV micafungin for now  Supportive care as per general surgery and medical teams  Continue to monitor daily labs for antibiotic toxicity  Further recommendations will depend on the above work-up and clinical progress     The plan of care was discussed with the primary hospital team, Dr Villegas     Recommendations were also discussed with the patient; all questions were answered.     Thank you for this consultation. Please don't hesitate to call the ID team for questions or any acute changes in patient's clinical condition.    Please note that this report has been produced using speech recognition software and may contain errors related to that system including, but not limited to, errors in grammar, punctuation, and spelling, as well as words and phrases that possibly may have been recognized inappropriately.  If there are any questions or concerns, contact the dictating provider for clarification.    The 21st Century Cures Act makes medical notes like these available to patients in the interest of transparency. Please be advised this is a medical document. Medical documents are intended to carry relevant information, facts as evident, and the clinical opinion of the practitioner. The medical note is intended as peer to peer communication and may appear blunt or direct. It is written in medical  language and may contain abbreviations or verbiage that are unfamiliar.     MD MANDI [facility]. Tel: [phone]. Fax: [phone].     Merissa Oshea : 1950 MRN: [id number] CSN: [id number]

## 2024-05-24 NOTE — PROGRESS NOTES
ONI Hospitalist Progress Note                                                                     [facility]   part of [facility]      Merissa Lancastersgaa  5/12/1950    SUBJECTIVE: no chest pain, palpitations, shortness of breath, cough, nausea, vomiting. Patient with post operative pain which is controlled. Pt with no flatus and no BM. NGT in place after 2nd surgery     OBJECTIVE:  Temp:  [97.6 °F (36.4 °C)-98.8 °F (37.1 °C)] 97.6 °F (36.4 °C)  Pulse:  [55-72] 65  Resp:  [16-18] 18  BP: (101-136)/(61-69) 136/61  SpO2:  [95 %-100 %] 100 %  Exam  Gen: No acute distress, alert and oriented   Pulm: Lungs clear bilaterally, normal respiratory effort, no crackles, no wheezing  CV: Heart with regular rate and rhythm, no murmur.   Abd: Abdomen soft, post surgical tenderness , nondistended, bowel sounds present-hypoactive  MSK: no pitting edema or tenderness of the LE  Skin: no new rashes or lesions    Labs:   Recent Labs   Lab 05/21/24  1134 05/22/24  0536 05/23/24  0916 05/24/24  0459   WBC 10.0 6.4 8.5 5.5   HGB 12.5 12.2 10.4* 9.0*   MCV 83.4 86.5 85.3 89.0   .0 168.0 180.0 202.0       Recent Labs   Lab 05/21/24  1134 05/22/24  0536 05/23/24  0453 05/24/24  0459    139 142 139   K 3.9 3.7 3.5 3.3*    105 108 109   CO2 30.0 29.0 28.0 27.0   BUN 9 8* 9 10   CREATSERUM 0.75 0.70 0.62 0.68   CA 8.0* 7.7* 8.0* 7.1*   MG  --   --  1.9 1.5*   PHOS  --   --   --  2.8   * 118* 101* 87       Recent Labs   Lab 05/24/24  0459   ALT 12*   AST 9*   ALB 1.5*       Recent Labs   Lab 05/24/24  0816   PGLU 82       Meds:   Scheduled:    magnesium sulfate  4 g Intravenous Once    micafungin  100 mg Intravenous Q24H    heparin  5,000 Units Subcutaneous 2 times per day    famotidine  20 mg Oral BID    Or    famotidine  20 mg Intravenous BID    magnesium oxide  400 mg Oral Daily    piperacillin-tazobactam  3.375 g Intravenous Q8H     Continuous Infusions:     lactated ringers      sodium chloride 100 mL/hr at 05/23/24 1654     PRN:   [Transfer Hold] bisacodyl    benzocaine-menthol    polyethylene glycol (PEG 3350)    sennosides    phenol    acetaminophen    oxyCODONE **OR** oxyCODONE    HYDROmorphone **OR** HYDROmorphone    ketorolac    [Transfer Hold] simethicone    [Transfer Hold] ondansetron    [Transfer Hold] metoclopramide    ASSESSMENT / PLAN:   74 year old female with no significant past medical history presenting with planned surgical intervention for recent acute appendicitis with perforation/abscess.      Acute Appendicitis/abscess  -pt admitted early April with this, received 2 drains and dc on iv abx --> completed course prior to admission  -POD # 4 s/p diagnostic laparoscopy, open appendectomy, extensive lysis of adhesions  -surgery following  -POD # 1 s/p exploratory laparotomy, lysis of adhesions, small bowel resection  -npo, diet per surgery  -iv zosyn  -body fluid pos for yeast --> ID consulted  -iv micafungin started     Acute post Operative pain  -as per surgery  -iv hydromorphone prn  -po oxy prn     Quality:  DVT Prophylaxis: scd, heparin sq  CODE status: Full per chart  Pike:      Plan of care discussed with patient and staff     Dispo: no discharge     Sam Villegas MD  [facility] Hospitalist  [phone]

## 2024-05-24 NOTE — OCCUPATIONAL THERAPY NOTE
OT orders received and pt chart reviewed. Pt observed walking in hallway with staff. Pt reported no acute OT concerns and just trying to produce flatulence. OT will sign off at this time.

## 2024-05-24 NOTE — PROGRESS NOTES
[facility]  Progress Note    Merissa Oshea Patient Status:  Inpatient    1950 MRN [id number]   [facility] 3NW-A Attending Jovanni Monsivais MD   Hosp Day # 3 PCP Cruz Puckett MD     Subjective:    NG output 400cc  BLANKA output 150cc  Patient is feeling improved today  She is ambulating, no flatus    Objective/Physical Exam:    Vital Signs:  Blood pressure 123/69, pulse 55, temperature 98.2 °F (36.8 °C), temperature source Oral, resp. rate 16, height 5' 4\" (1.626 m), weight 105 lb (47.6 kg), SpO2 96%, not currently breastfeeding.    General:  Alert, orientated x3.  Cooperative.  No apparent distress.  Abdomen:  blanka ss output, aqaucel intact, soft, mild distension, mild tenderness as expected    Labs:  Reviewed    Lab Results   Component Value Date    WBC 5.5 2024    HGB 9.0 2024    HCT 28.3 2024    .0 2024    CREATSERUM 0.68 2024    BUN 10 2024     2024    K 3.3 2024     2024    CO2 27.0 2024    GLU 87 2024    CA 7.1 2024    ALB 1.5 2024    ALKPHO 65 2024    BILT 0.5 2024    TP 4.2 2024    AST 9 2024    ALT 12 2024    MG 1.5 2024    PHOS 2.8 2024    PGLU 82 2024       Problem List:  Patient Active Problem List   Diagnosis    Lumbar stenosis    Decreased strength of upper extremity    Plantar neuroma of left foot    Capsular contracture of breast implant    Depressive disorder    Hearing loss    Martinez's metatarsalgia    Bradycardia    Diarrhea of presumed infectious origin    Acute appendicitis with perforation, generalized peritonitis, and abscess, unspecified whether gangrene present    Dehydration    Hypokalemia    Hyponatremia    Postoperative pain       Impression:     73 y/o POD # 1 Exploratory laparotomy, lysis of adhesions, small-bowel resection. S/p diagnostic lap converted to open appendectomy FREEDOM 24  Cx: gram stain with yeast  present: started antifungal     Plan:    Ng to LIS  NPO ice chips, gum ok  Encourage ambulation  IV antibiotics, anti-fungal   IV fluids  Ok to dc rigo  All questions answered    MOO Solares  [facility] Surgery  5/24/2024

## 2024-05-25 LAB
ANION GAP SERPL CALC-SCNC: 7 MMOL/L (ref 0–18)
BASOPHILS # BLD AUTO: 0.04 X10(3) UL (ref 0–0.2)
BASOPHILS NFR BLD AUTO: 0.9 %
BUN BLD-MCNC: 6 MG/DL (ref 9–23)
CALCIUM BLD-MCNC: 7.3 MG/DL (ref 8.5–10.1)
CHLORIDE SERPL-SCNC: 109 MMOL/L (ref 98–112)
CO2 SERPL-SCNC: 25 MMOL/L (ref 21–32)
CREAT BLD-MCNC: 0.58 MG/DL
EGFRCR SERPLBLD CKD-EPI 2021: 96 ML/MIN/1.73M2 (ref 60–?)
EOSINOPHIL # BLD AUTO: 0.38 X10(3) UL (ref 0–0.7)
EOSINOPHIL NFR BLD AUTO: 8.4 %
ERYTHROCYTE [DISTWIDTH] IN BLOOD BY AUTOMATED COUNT: 13 %
GLUCOSE BLD-MCNC: 85 MG/DL (ref 70–99)
HCT VFR BLD AUTO: 25.3 %
HGB BLD-MCNC: 8.7 G/DL
IMM GRANULOCYTES # BLD AUTO: 0.02 X10(3) UL (ref 0–1)
IMM GRANULOCYTES NFR BLD: 0.4 %
LYMPHOCYTES # BLD AUTO: 0.9 X10(3) UL (ref 1–4)
LYMPHOCYTES NFR BLD AUTO: 20 %
MAGNESIUM SERPL-MCNC: 2.4 MG/DL (ref 1.6–2.6)
MCH RBC QN AUTO: 29.4 PG (ref 26–34)
MCHC RBC AUTO-ENTMCNC: 34.4 G/DL (ref 31–37)
MCV RBC AUTO: 85.5 FL
MONOCYTES # BLD AUTO: 0.42 X10(3) UL (ref 0.1–1)
MONOCYTES NFR BLD AUTO: 9.3 %
NEUTROPHILS # BLD AUTO: 2.75 X10 (3) UL (ref 1.5–7.7)
NEUTROPHILS # BLD AUTO: 2.75 X10(3) UL (ref 1.5–7.7)
NEUTROPHILS NFR BLD AUTO: 61 %
OSMOLALITY SERPL CALC.SUM OF ELEC: 289 MOSM/KG (ref 275–295)
PLATELET # BLD AUTO: 223 10(3)UL (ref 150–450)
POTASSIUM SERPL-SCNC: 3 MMOL/L (ref 3.5–5.1)
POTASSIUM SERPL-SCNC: 3 MMOL/L (ref 3.5–5.1)
POTASSIUM SERPL-SCNC: 3.8 MMOL/L (ref 3.5–5.1)
RBC # BLD AUTO: 2.96 X10(6)UL
SODIUM SERPL-SCNC: 141 MMOL/L (ref 136–145)
WBC # BLD AUTO: 4.5 X10(3) UL (ref 4–11)

## 2024-05-25 PROCEDURE — 85025 COMPLETE CBC W/AUTO DIFF WBC: CPT | Performed by: HOSPITALIST

## 2024-05-25 PROCEDURE — 80048 BASIC METABOLIC PNL TOTAL CA: CPT | Performed by: HOSPITALIST

## 2024-05-25 PROCEDURE — 84132 ASSAY OF SERUM POTASSIUM: CPT | Performed by: INTERNAL MEDICINE

## 2024-05-25 PROCEDURE — 84132 ASSAY OF SERUM POTASSIUM: CPT | Performed by: SURGERY

## 2024-05-25 PROCEDURE — 83735 ASSAY OF MAGNESIUM: CPT | Performed by: HOSPITALIST

## 2024-05-25 PROCEDURE — 83735 ASSAY OF MAGNESIUM: CPT | Performed by: SURGERY

## 2024-05-25 RX ORDER — ENOXAPARIN SODIUM 100 MG/ML
40 INJECTION SUBCUTANEOUS DAILY
Status: DISCONTINUED | OUTPATIENT
Start: 2024-05-25 | End: 2024-05-28

## 2024-05-25 RX ORDER — POTASSIUM CHLORIDE 20 MEQ/1
40 TABLET, EXTENDED RELEASE ORAL EVERY 4 HOURS
Status: COMPLETED | OUTPATIENT
Start: 2024-05-25 | End: 2024-05-25

## 2024-05-25 RX ORDER — ACETAMINOPHEN 325 MG/1
650 TABLET ORAL EVERY 6 HOURS PRN
Status: DISCONTINUED | OUTPATIENT
Start: 2024-05-25 | End: 2024-05-28

## 2024-05-25 NOTE — PROGRESS NOTES
A&Ox4. VSS. RA. .  GI: Abdomen soft, nondistended. Passing gas, reports bowel movements.  Denies nausea.  : Voids.  Pain controlled with PRN pain medications  Up ad jim.  Drains:TYREL to bulb suction draining serosanguinous drainage  Incisions:midline staples CDI. Bandaid to previous drain site CDI.  Diet:tolerating clears  IVF running per order.  All appropriate safety measures in place. All questions and concerns addressed. Will continue to monitor

## 2024-05-25 NOTE — PROGRESS NOTES
NG clamp trial passed w/ residual of 10ml light green. NGT removed at 2310 and pt started on clears. Denies nausea, passing gas, active bowel sounds. Mild pain, managing with Toradol and Tylenol. Pt voids adequately. IVF and IV Zosyn via R Picc line. Ambulating independently. Poc discussed, call light in reach

## 2024-05-25 NOTE — PROGRESS NOTES
ONI Hospitalist Progress Note                                                                     [facility]   part of [facility]      Merissa Aguilar  5/12/1950    SUBJECTIVE: no chest pain, palpitations, shortness of breath, cough, nausea, vomiting. Patient with post operative pain which is controlled. Passing gas, had 1 BM yest and 2 Bms today  Some pain, drain in place.     OBJECTIVE:  Temp:  [97.5 °F (36.4 °C)-98.8 °F (37.1 °C)] 98.4 °F (36.9 °C)  Pulse:  [55-74] 66  Resp:  [16-19] 18  BP: (120-146)/(61-76) 120/63  SpO2:  [96 %-100 %] 97 %  Exam  Gen: No acute distress, alert and oriented   Pulm: Lungs clear bilaterally, normal respiratory effort, no crackles, no wheezing  CV: Heart with regular rate and rhythm, no murmur.   Abd: Abdomen soft, post surgical tenderness , nondistended, bowel sounds present-hypoactive  MSK: no pitting edema or tenderness of the LE  Skin: no new rashes or lesions    Labs:   Recent Labs   Lab 05/21/24  1134 05/22/24  0536 05/23/24  0916 05/24/24  0459 05/25/24  0650   WBC 10.0 6.4 8.5 5.5 4.5   HGB 12.5 12.2 10.4* 9.0* 8.7*   MCV 83.4 86.5 85.3 89.0 85.5   .0 168.0 180.0 202.0 223.0       Recent Labs   Lab 05/21/24  1134 05/22/24  0536 05/23/24  0453 05/24/24  0459 05/25/24  0650    139 142 139 141   K 3.9 3.7 3.5 3.3* 3.0*  3.0*    105 108 109 109   CO2 30.0 29.0 28.0 27.0 25.0   BUN 9 8* 9 10 6*   CREATSERUM 0.75 0.70 0.62 0.68 0.58   CA 8.0* 7.7* 8.0* 7.1* 7.3*   MG  --   --  1.9 1.5* 2.4   PHOS  --   --   --  2.8  --    * 118* 101* 87 85       Recent Labs   Lab 05/24/24  0459   ALT 12*   AST 9*   ALB 1.5*       Recent Labs   Lab 05/24/24  0816   PGLU 82       Meds:   Scheduled:    micafungin  100 mg Intravenous Q24H    heparin  5,000 Units Subcutaneous 2 times per day    famotidine  20 mg Oral BID    Or    famotidine  20 mg Intravenous BID    magnesium oxide  400 mg Oral Daily     piperacillin-tazobactam  3.375 g Intravenous Q8H     Continuous Infusions:    lactated ringers      sodium chloride 100 mL/hr at 05/25/24 0322     PRN:   [Transfer Hold] bisacodyl    benzocaine-menthol    polyethylene glycol (PEG 3350)    sennosides    phenol    acetaminophen    oxyCODONE **OR** oxyCODONE    HYDROmorphone **OR** HYDROmorphone    ketorolac    [Transfer Hold] simethicone    [Transfer Hold] ondansetron    [Transfer Hold] metoclopramide    ASSESSMENT / PLAN:   74 year old female with no significant past medical history presenting with planned surgical intervention for recent acute appendicitis with perforation/abscess.      Acute Appendicitis/abscess/small bowl perf s/p ex lap, lysis of adhesions, small bowel resection 5/23/2024  -pt admitted early April with this, received 2 drains and dc on iv abx --> completed course prior to admission  -POD # 4 s/p diagnostic laparoscopy, open appendectomy, extensive lysis of adhesions  -surgery following  -npo, diet, wound care, drain care per surgery  -iv zosyn  -surgical cultures growing yeast >> iv micafungin started >> ID following     Acute post Operative pain  -as per surgery  -iv hydromorphone prn  -po oxy prn     Quality:  DVT Prophylaxis: scd, heparin sq  CODE status: Full per chart  Pike:      Plan of care discussed with patient and staff     DISPO:  Cont inpt care  Will follow along      Shanice Knapp Hospitalist  Pager [phone]  Answering Service number: [phone]

## 2024-05-25 NOTE — PROGRESS NOTES
Infectious Disease Progress Note      Date of admission: 5/20/2024  7:14 AM     Reason for consult: Peritonitis    Subjective: Feels better this morning.  Abdominal pain continues to improve.  She finally had a bowel movement and was started on p.o. diet.    The rest of the systems were reviewed and found to be negative except was mentioned above    Interval events: This is a 74-year-old female patient, with a complicated history of an intra-abdominal infection that started off with an appendicitis with an abscess.  Underwent drainage back in April, eventually came here for an appendectomy, course complicated by a small bowel perforation for which she underwent a small bowel resection.  Surgical cultures now growing yeast.  Currently on IV Zosyn and IV micafungin.      Medications:    potassium chloride    micafungin    [Transfer Hold] bisacodyl    lactated ringers    heparin    benzocaine-menthol    polyethylene glycol (PEG 3350)    sennosides    famotidine **OR** famotidine    magnesium oxide    phenol    acetaminophen    oxyCODONE **OR** oxyCODONE    HYDROmorphone **OR** HYDROmorphone    ketorolac    sodium chloride    [Transfer Hold] simethicone    [Transfer Hold] ondansetron    [Transfer Hold] metoclopramide    piperacillin-tazobactam     Allergies:  No Known Allergies    Physical Exam:  Vitals:    05/25/24 0317   BP: 120/63   Pulse: 66   Resp: 18   Temp: 98.4 °F (36.9 °C)     Vitals signs and nursing note reviewed.   Constitutional:       Appearance: Normal appearance.   HENT:      Head: Normocephalic and atraumatic.      Mouth: Mucous membranes are moist.   Neck:      Musculoskeletal: Neck supple.   Cardiovascular:      Rate and Rhythm: Normal rate.   Pulmonary:      Effort: Pulmonary effort is normal. No respiratory distress.   Abdominal:      General: Abdomen is flat. There is no distension.  Surgical incision noted without any signs of infection.  Drains noted.     Palpations: Abdomen is soft. There is  no mass.      Tenderness: There is no tenderness. There is no guarding or rebound.      Hernia: No hernia is present.   Musculoskeletal:      Right lower leg: No edema.      Left lower leg: No edema.   Skin:     General: Skin is warm and dry.   Neurological:      General: No focal deficit present.      Mental Status: Alert and oriented to person, place, and time.       Laboratory data:  I have reviewed all the lab results independently.  Lab Results   Component Value Date    WBC 4.5 05/25/2024    HGB 8.7 05/25/2024    HCT 25.3 05/25/2024    .0 05/25/2024    CREATSERUM 0.58 05/25/2024    BUN 6 05/25/2024     05/25/2024    K 3.0 05/25/2024    K 3.0 05/25/2024     05/25/2024    CO2 25.0 05/25/2024    GLU 85 05/25/2024    CA 7.3 05/25/2024    MG 2.4 05/25/2024      Recent Labs   Lab 05/25/24  0650   RBC 2.96*   HGB 8.7*   HCT 25.3*   MCV 85.5   MCH 29.4   MCHC 34.4   RDW 13.0   NEPRELIM 2.75   WBC 4.5   .0      Microbiology data:  Hospital Encounter on 05/20/24   1. Body Fluid Cult Aerobic and Anaerobic     Status: Abnormal (Preliminary result)    Collection Time: 05/23/24 12:08 PM    Specimen: Peritoneal fluid; Body fluid, unspecified   Result Value Ref Range    Body Fluid Smear No WBCs seen (A) N/A    Body Fluid Smear No organisms seen (A) N/A    Body Fluid Smear Gram stain of positive bottle shows: (A) N/A    Body Fluid Smear Yeast (A) N/A      Impression:  Merissa Oshea is a 74 year old female with    Small bowel perforation in the setting of recent appendectomy  The patient originally presented back in April with acute appendicitis with abdominal abscess  Status post drainage on 4/20 with cultures then growing Pseudomonas aeruginosa, Bacteroides species, and E. coli along with strep species  Was discharged on IV meropenem  One of the abscesses resolved; however, the other abscess did not  Brought back to the hospital for an appendectomy that was done on 5/20  Taken back to the OR on   in the setting of a small bowel perforation for small bowel resection  Surgical cultures from  are growing yeast  Currently on IV Zosyn and IV micafungin    Recommendations:    Continue to follow-up on surgical cultures  Continue IV Zosyn and IV micafungin for now  Given the fungal peritonitis, she will need 4 weeks of antimicrobial therapy, hopefully an oral option is available based on her cultures  Continue to monitor daily labs for antibiotic toxicities  Further recommendations will depend on the above work-up and clinical progress     The plan of care was discussed with the primary hospital team, Jovanni Monsivais MD     Recommendations were also discussed with the patient; all questions were answered.     Thank you for this consultation. Please don't hesitate to call the ID team for questions or any acute changes in patient's clinical condition.    Please note that this report has been produced using speech recognition software and may contain errors related to that system including, but not limited to, errors in grammar, punctuation, and spelling, as well as words and phrases that possibly may have been recognized inappropriately.  If there are any questions or concerns, contact the dictating provider for clarification.    The  Cent[employer] Cures Act makes medical notes like these available to patients in the interest of transparency. Please be advised this is a medical document. Medical documents are intended to carry relevant information, facts as evident, and the clinical opinion of the practitioner. The medical note is intended as peer to peer communication and may appear blunt or direct. It is written in medical language and may contain abbreviations or verbiage that are unfamiliar.     Anjana Salas MD  [facility]. Tel: [phone]. Fax: [phone].     Merissa RAI Dayo : 1950 MRN: [id number] [facility]: [id number]

## 2024-05-25 NOTE — PROGRESS NOTES
[facility]  Progress Note    Merissa Oshea Patient Status:  Inpatient    1950 MRN [id number]   Location [facility] 3NW-A Attending Jovanni Monsivais MD   Hosp Day # 4 PCP Cruz Puckett MD     Subjective:    Patient feels well today.  Tolerating clear liquids.  Positive bowel movement    Objective/Physical Exam:    Vital Signs:        2024     7:49 PM 2024     3:17 AM   Vitals History   /66 120/63   BP Location Left arm Left arm   Pulse 74 66   Resp 19 18   Temp 98.8 °F (37.1 °C) 98.4 °F (36.9 °C)   SpO2 99 % 97 %        General: Alert,  Cooperative.  No apparent distress.  Skin: Normal texture and turgor.  HEENT: Exam is unremarkable.  Without scleral icterus.    Neck: No JVD. Supple.   Lungs: Clear to auscultation bilaterally.  Cardiac: Regular rate and rhythm. No murmur.  Abdomen: Soft with incisional discomfort as expected.  Incision clean and dry.  TYREL output serous  Extremities:  No lower extremity edema noted.   Neurologic:  No focal neurologic deficit.    Labs:    Recent Labs   Lab 24  1134 24  0536 24  0916 24  0459 24  0650   WBC 10.0 6.4 8.5 5.5 4.5   HGB 12.5 12.2 10.4* 9.0* 8.7*   MCV 83.4 86.5 85.3 89.0 85.5   .0 168.0 180.0 202.0 223.0     Recent Labs   Lab 24  1134 24  0536 24  0453 24  0459 24  0650    139 142 139 141   K 3.9 3.7 3.5 3.3* 3.0*  3.0*    105 108 109 109   CO2 30.0 29.0 28.0 27.0 25.0   BUN 9 8* 9 10 6*   CREATSERUM 0.75 0.70 0.62 0.68 0.58   * 118* 101* 87 85   CA 8.0* 7.7* 8.0* 7.1* 7.3*   MG  --   --  1.9 1.5* 2.4   PHOS  --   --   --  2.8  --        RADIOLOGY:    XR CHEST AP PORTABLE  (CPT=71045)    Result Date: 2024  PROCEDURE:  XR CHEST AP PORTABLE  (CPT=71045)  TECHNIQUE:  AP chest radiograph was obtained.  COMPARISON:  [facility] , CT, CT ABSCESS INJECTION VIA EXISTING CATH (CPT=76080/24830), 2024, 1:08 PM.  [facility], XR, XR CHEST PA + LAT CHEST  (CPT=71046), 3/05/2023, 10:31 AM.  INDICATIONS:  PICC placement  PATIENT STATED HISTORY: (As transcribed by Technologist)  Patient offered no additional history at this time.    FINDINGS:  Right-sided PICC tip at the cavoatrial junction. Normal heart size and pulmonary vascularity. No pleural effusion or pneumothorax. No lobar consolidation.  Nonspecific gaseous distention of the bowel the upper abdomen.  Free air the upper abdomen related to recent abdominal surgery.            CONCLUSION:  Right-sided PICC tip at the cavoatrial junction.  Free air in the upper abdomen related to recent abdominal surgery.   LOCATION:  [facility]      Dictated by (CST): Mele Couch MD on 5/21/2024 at 10:04 AM     Finalized by (CST): Mele Couch MD on 5/21/2024 at 10:05 AM       CT ABSCESS INJECTION VIA EXISTING CATH (CPT=76080/67207)    Result Date: 5/8/2024  PROCEDURE:  CT ABSCESS INJECTION VIA EXISTING CATH (CPT=76080/68630)  COMPARISON:  [facility] , CT, CT DRAIN ABSCESS APPENDIX (CPT=49406), 4/20/2024, 10:49 AM.  INDICATIONS:  K35.211 Acute appendicitis with perforation, generalized peritonitis, and abscess, unspecified whether gangrene present  TECHNIQUE:  Witnessed verbal and written informed consent was obtained.  Time out was performed by the staff. A diluted contrast injection of abscess via existing drainage catheter was performed in the usual sterile manner with CT imaging. Dose reduction  techniques were used. Dose information is transmitted to the ACR (American College of Radiology) NRDR (National Radiology Data Registry) which includes the Dose Index Registry.  PATIENT STATED HISTORY:(As transcribed by Technologist)  Patient is here to evaluate left buttock drain.   CONTRAST USED:  1cc of Isovue 370  FINDINGS:  CT through the pelvis in the area of the left gluteal drain was performed pre and post injection of contrast material through the drainage catheter.  Pre contrast there is no significant residual cavity, the  drainage catheter appears appropriately seated.  Post contrast injection demonstrates immediate return of contrast material around the catheter site.  There is no significant residual cavity following injection of 10 cc of diluted contrast material.  The catheter was cut and removed in its entirety.  Sterile dressing was applied.    CONCLUSION:  No significant residual abscess cavity or fistula in relation to the left gluteal drainage catheter.  The catheter was removed.             LOCATION:  [facility]   Dictated by (CST): Marco Antonio Tran MD on 5/08/2024 at 1:58 PM     Finalized by (CST): Marco Antonio Tran MD on 5/08/2024 at 1:59 PM         PROBLEM LIST:    Patient Active Problem List   Diagnosis    Lumbar stenosis    Decreased strength of upper extremity    Plantar neuroma of left foot    Capsular contracture of breast implant    Depressive disorder    Hearing loss    Martinez's metatarsalgia    Bradycardia    Diarrhea of presumed infectious origin    Acute appendicitis with perforation, generalized peritonitis, and abscess, unspecified whether gangrene present    Dehydration    Hypokalemia    Hyponatremia    Postoperative pain       IMPRESSION:    Status post partial small bowel resection for perforation    PLAN:    Continue clear liquids sparingly today.  Continue IV antibiotics per infectious disease.  Continue to follow    Joey Valdez MD  5/25/2024  10:06 AM

## 2024-05-25 NOTE — CDS QUERY
DOCUMENTATION CLARIFICATION FORM    Dear Dr. Flowers  Clinical information, provided below, indicates a BMI of 18.    Can you please further clarify in the medical record the diagnosis associated with these findings:    (  X ) Underweight  (   ) Other condition (please specify)      Documentation from the Medical Record:     Clinical Indicators:   05/20/24 - Hospitalist consult - BMI 18.02 kg/m²    05/23/24 - Gen surgery- height 5' 4\" (1.626 m), weight 105 lb     05/23/24 Dietary note- Now s/p diagnostic laparoscopy, open appendectomy, extensive lysis of adhesions. Chart reviewed for +MST/low BMI. Address recent wt loss, pt states \"I just finished a bowel prep\". Per EPIC, pt is down 13#(11%) x 1 month-significant per standards. Pt is a vegetarian. Does report decreased PO with recent appendicitis. Declined ONS.  WEIGHT HISTORY:  Weight loss: Yes, 13# (11%) x 1 month  Wt Readings from Last 10 Encounters:  05/21/24 47.6 kg (105 lb)  04/19/24 53.5 kg (118 lb)  09/24/23 53.5 kg (118 lb)  NUTRITION DIAGNOSIS/PROBLEM: Underweight related to decreased intake as evidenced by documented/reported unintentional weight loss and low BMI  Pt is at Moderate nutrition risk      Risk Factors Advanced age, S/P diagnostic laparoscopy, open appendectomy, extensive lysis of adhesions    Treatment: Dietician consult             For questions regarding this query, please contact Clinical : Maddison Wing RN/BSN @ [phone]  Thank you    THIS FORM IS A PERMANENT PART OF THE MEDICAL RECORD

## 2024-05-26 RX ORDER — FUROSEMIDE 20 MG/1
20 TABLET ORAL DAILY
Status: DISCONTINUED | OUTPATIENT
Start: 2024-05-26 | End: 2024-05-28

## 2024-05-26 NOTE — PROGRESS NOTES
[facility]  Progress Note    Merissa Oshea Patient Status:  Inpatient    1950 MRN [id number]   Location [facility] 3NW-A Attending Jovanni Monsivais MD   Hosp Day # 5 PCP Cruz Puckett MD     Subjective:    Feels better today.  Tolerating clear liquids.  Positive bowel movements    Objective/Physical Exam:    Vital Signs:        2024     3:59 AM 2024    11:17 AM   Vitals History   /65 136/72   BP Location Left arm Left arm   Pulse 68 73   Resp 17 18   Temp 97.8 °F (36.6 °C) 98.6 °F (37 °C)   SpO2 99 % 100 %        General: Alert,  Cooperative.  No apparent distress.  Skin: Normal texture and turgor.  HEENT: Exam is unremarkable.  Without scleral icterus.    Neck: No JVD. Supple.   Lungs: Clear to auscultation bilaterally.  Cardiac: Regular rate and rhythm. No murmur.  Abdomen: Soft.  Incision clean and dry.  TYREL serosanguineous  Extremities:  No lower extremity edema noted.   Neurologic:  No focal neurologic deficit.    Labs:    Recent Labs   Lab 24  1134 24  0536 24  0916 24  0459 24  0650   WBC 10.0 6.4 8.5 5.5 4.5   HGB 12.5 12.2 10.4* 9.0* 8.7*   MCV 83.4 86.5 85.3 89.0 85.5   .0 168.0 180.0 202.0 223.0     Recent Labs   Lab 24  1134 24  0536 24  0453 24  0459 24  0650 24  1628    139 142 139 141  --    K 3.9 3.7 3.5 3.3* 3.0*  3.0* 3.8    105 108 109 109  --    CO2 30.0 29.0 28.0 27.0 25.0  --    BUN 9 8* 9 10 6*  --    CREATSERUM 0.75 0.70 0.62 0.68 0.58  --    * 118* 101* 87 85  --    CA 8.0* 7.7* 8.0* 7.1* 7.3*  --    MG  --   --  1.9 1.5* 2.4  --    PHOS  --   --   --  2.8  --   --        RADIOLOGY:    XR CHEST AP PORTABLE  (CPT=71045)    Result Date: 2024  PROCEDURE:  XR CHEST AP PORTABLE  (CPT=71045)  TECHNIQUE:  AP chest radiograph was obtained.  COMPARISON:  [facility] , CT, CT ABSCESS INJECTION VIA EXISTING CATH (CPT=76080/83230), 2024, 1:08 PM.  [facility], XR  CHEST PA + LAT CHEST (CPT=71046), 3/05/2023, 10:31 AM.  INDICATIONS:  PICC placement  PATIENT STATED HISTORY: (As transcribed by Technologist)  Patient offered no additional history at this time.    FINDINGS:  Right-sided PICC tip at the cavoatrial junction. Normal heart size and pulmonary vascularity. No pleural effusion or pneumothorax. No lobar consolidation.  Nonspecific gaseous distention of the bowel the upper abdomen.  Free air the upper abdomen related to recent abdominal surgery.            CONCLUSION:  Right-sided PICC tip at the cavoatrial junction.  Free air in the upper abdomen related to recent abdominal surgery.   LOCATION:  [facility]      Dictated by (CST): Mele Couch MD on 5/21/2024 at 10:04 AM     Finalized by (CST): Mele Couch MD on 5/21/2024 at 10:05 AM       CT ABSCESS INJECTION VIA EXISTING CATH (CPT=76080/37180)    Result Date: 5/8/2024  PROCEDURE:  CT ABSCESS INJECTION VIA EXISTING CATH (CPT=76080/49890)  COMPARISON:  [facility] , CT, CT DRAIN ABSCESS APPENDIX (CPT=49406), 4/20/2024, 10:49 AM.  INDICATIONS:  K35.211 Acute appendicitis with perforation, generalized peritonitis, and abscess, unspecified whether gangrene present  TECHNIQUE:  Witnessed verbal and written informed consent was obtained.  Time out was performed by the staff. A diluted contrast injection of abscess via existing drainage catheter was performed in the usual sterile manner with CT imaging. Dose reduction  techniques were used. Dose information is transmitted to the ACR (American College of Radiology) NRDR (National Radiology Data Registry) which includes the Dose Index Registry.  PATIENT STATED HISTORY:(As transcribed by Technologist)  Patient is here to evaluate left buttock drain.   CONTRAST USED:  1cc of Isovue 370  FINDINGS:  CT through the pelvis in the area of the left gluteal drain was performed pre and post injection of contrast material through the drainage catheter.  Pre contrast there is no significant  residual cavity, the drainage catheter appears appropriately seated.  Post contrast injection demonstrates immediate return of contrast material around the catheter site.  There is no significant residual cavity following injection of 10 cc of diluted contrast material.  The catheter was cut and removed in its entirety.  Sterile dressing was applied.    CONCLUSION:  No significant residual abscess cavity or fistula in relation to the left gluteal drainage catheter.  The catheter was removed.             LOCATION:  [facility]   Dictated by (CST): Marco Antonio Tran MD on 5/08/2024 at 1:58 PM     Finalized by (CST): Marco Antonio Tran MD on 5/08/2024 at 1:59 PM         PROBLEM LIST:    Patient Active Problem List   Diagnosis    Lumbar stenosis    Decreased strength of upper extremity    Plantar neuroma of left foot    Capsular contracture of breast implant    Depressive disorder    Hearing loss    Martinez's metatarsalgia    Bradycardia    Diarrhea of presumed infectious origin    Acute appendicitis with perforation, generalized peritonitis, and abscess, unspecified whether gangrene present    Dehydration    Hypokalemia    Hyponatremia    Postoperative pain       IMPRESSION:    Status post partial small bowel resection secondary to perforation    PLAN:    Advance diet today.  Potential discharge tomorrow    Joey Valdez MD  5/26/2024  11:32 AM

## 2024-05-26 NOTE — PROGRESS NOTES
[facility] Hospitalist Progress Note                                                                     [facility]   part of [facility]      Merissa Aguilar  5/12/1950    SUBJECTIVE: no chest pain, palpitations, shortness of breath, cough, nausea, vomiting. Patient with post operative pain which is controlled.   Passing gas, having loose Bms  Some pain, drain in place.     OBJECTIVE:  Temp:  [97.8 °F (36.6 °C)-97.9 °F (36.6 °C)] 97.8 °F (36.6 °C)  Pulse:  [65-68] 68  Resp:  [17-19] 17  BP: (128-147)/(65-99) 128/65  SpO2:  [99 %-100 %] 99 %  Exam  Gen: No acute distress, alert and oriented   Pulm: Lungs clear bilaterally, normal respiratory effort, no crackles, no wheezing  CV: Heart with regular rate and rhythm, no murmur.   Abd: Abdomen soft, post surgical tenderness , nondistended, bowel sounds present-hypoactive  MSK: no pitting edema or tenderness of the LE  Skin: no new rashes or lesions    Labs:   Recent Labs   Lab 05/21/24  1134 05/22/24  0536 05/23/24  0916 05/24/24  0459 05/25/24  0650   WBC 10.0 6.4 8.5 5.5 4.5   HGB 12.5 12.2 10.4* 9.0* 8.7*   MCV 83.4 86.5 85.3 89.0 85.5   .0 168.0 180.0 202.0 223.0       Recent Labs   Lab 05/21/24  1134 05/22/24  0536 05/23/24  0453 05/24/24  0459 05/25/24  0650 05/25/24  1628    139 142 139 141  --    K 3.9 3.7 3.5 3.3* 3.0*  3.0* 3.8    105 108 109 109  --    CO2 30.0 29.0 28.0 27.0 25.0  --    BUN 9 8* 9 10 6*  --    CREATSERUM 0.75 0.70 0.62 0.68 0.58  --    CA 8.0* 7.7* 8.0* 7.1* 7.3*  --    MG  --   --  1.9 1.5* 2.4  --    PHOS  --   --   --  2.8  --   --    * 118* 101* 87 85  --        Recent Labs   Lab 05/24/24  0459   ALT 12*   AST 9*   ALB 1.5*       Recent Labs   Lab 05/24/24  0816   PGLU 82       Meds:   Scheduled:    enoxaparin  40 mg Subcutaneous Daily    micafungin  100 mg Intravenous Q24H    famotidine  20 mg Oral BID    Or    famotidine  20 mg Intravenous BID    magnesium  oxide  400 mg Oral Daily    piperacillin-tazobactam  3.375 g Intravenous Q8H     Continuous Infusions:    sodium chloride Stopped (05/26/24 0632)     PRN:   acetaminophen    [Transfer Hold] bisacodyl    benzocaine-menthol    polyethylene glycol (PEG 3350)    sennosides    phenol    oxyCODONE **OR** oxyCODONE    HYDROmorphone **OR** HYDROmorphone    [Transfer Hold] simethicone    [Transfer Hold] ondansetron    [Transfer Hold] metoclopramide    ASSESSMENT / PLAN:   74 year old female with no significant past medical history presenting with planned surgical intervention for recent acute appendicitis with perforation/abscess.      Acute Appendicitis/abscess/small bowl perf s/p ex lap, lysis of adhesions, small bowel resection 5/23/2024  -pt admitted early April with this, received 2 drains and dc on iv abx --> completed course prior to admission  -POD # 6 s/p diagnostic laparoscopy, open appendectomy, extensive lysis of adhesions  -surgery following  -diet, wound care, drain care per surgery  -iv zosyn  -surgical cultures growing yeast >> iv micafungin started >> ID following  -stop IVF  -lasix 20 daily x 2 days     Acute post Operative pain  -as per surgery  -iv hydromorphone prn  -po oxy prn     Quality:  DVT Prophylaxis: scd, heparin sq  CODE status: Full per chart  Pike:      Plan of care discussed with patient and staff     DISPO:  Cont inpt care  Will follow along      Shanice Knapp Hospitalist  Pager [phone]  Answering Service number: [phone]

## 2024-05-26 NOTE — PROGRESS NOTES
Alert & oriented x4. VSS on room air. Voids. Tolerating diet. Ambulates independently. Midline with staples MANISH and c/d/I. Right TYREL draining serosanguineous fluid. Denies nausea/chest pain/SOB. Pain controlled. Patient updated on plan of care. Questions and concerns addressed. Safety precautions in place. Frequent rounds performed.

## 2024-05-26 NOTE — PROGRESS NOTES
Infectious Disease Progress Note      Date of admission: 5/20/2024  7:14 AM     Reason for consult: Peritonitis    Subjective: Feels better this morning.  Abdominal pain continues to improve.  She finally had a bowel movement and was started on p.o. diet.    The rest of the systems were reviewed and found to be negative except was mentioned above    Interval events: This is a 74-year-old female patient, with a complicated history of an intra-abdominal infection that started off with an appendicitis with an abscess.  Underwent drainage back in April, eventually came here for an appendectomy, course complicated by a small bowel perforation for which she underwent a small bowel resection.  Surgical cultures now growing Candida krusei.  Currently on IV Zosyn and IV micafungin.      Medications:    enoxaparin    acetaminophen    micafungin    [Transfer Hold] bisacodyl    benzocaine-menthol    polyethylene glycol (PEG 3350)    sennosides    famotidine **OR** famotidine    magnesium oxide    phenol    oxyCODONE **OR** oxyCODONE    HYDROmorphone **OR** HYDROmorphone    sodium chloride    [Transfer Hold] simethicone    [Transfer Hold] ondansetron    [Transfer Hold] metoclopramide    piperacillin-tazobactam     Allergies:  No Known Allergies    Physical Exam:  Vitals:    05/26/24 0359   BP: 128/65   Pulse: 68   Resp: 17   Temp: 97.8 °F (36.6 °C)     Vitals signs and nursing note reviewed.   Constitutional:       Appearance: Normal appearance.   HENT:      Head: Normocephalic and atraumatic.      Mouth: Mucous membranes are moist.   Neck:      Musculoskeletal: Neck supple.   Cardiovascular:      Rate and Rhythm: Normal rate.   Pulmonary:      Effort: Pulmonary effort is normal. No respiratory distress.   Abdominal:      General: Abdomen is flat. There is no distension.  Surgical incision noted without any signs of infection.  Drains noted.     Palpations: Abdomen is soft. There is no mass.      Tenderness: There is no  tenderness. There is no guarding or rebound.      Hernia: No hernia is present.   Musculoskeletal:      Right lower leg: No edema.      Left lower leg: No edema.   Skin:     General: Skin is warm and dry.   Neurological:      General: No focal deficit present.      Mental Status: Alert and oriented to person, place, and time.       Laboratory data:  I have reviewed all the lab results independently.  Lab Results   Component Value Date    K 3.8 05/25/2024      Recent Labs   Lab 05/25/24  0650   RBC 2.96*   HGB 8.7*   HCT 25.3*   MCV 85.5   MCH 29.4   MCHC 34.4   RDW 13.0   NEPRELIM 2.75   WBC 4.5   .0      Microbiology data:  Hospital Encounter on 05/20/24   1. Body Fluid Cult Aerobic and Anaerobic     Status: Abnormal    Collection Time: 05/23/24 12:08 PM    Specimen: Peritoneal fluid; Body fluid, unspecified   Result Value Ref Range    Body Fluid Culture Result Candida krusei (A) N/A    Body Fluid Smear No WBCs seen (A) N/A    Body Fluid Smear No organisms seen (A) N/A    Body Fluid Smear Gram stain of positive bottle shows: (A) N/A    Body Fluid Smear Yeast (A) N/A      Impression:  Merissa Oshea is a 74 year old female with    Small bowel perforation in the setting of recent appendectomy with threat to life  The patient originally presented back in April with acute appendicitis with abdominal abscess  Status post drainage on 4/20 with cultures then growing Pseudomonas aeruginosa, Bacteroides species, and E. coli along with strep species  Was discharged on IV meropenem  One of the abscesses resolved; however, the other abscess did not  Brought back to the hospital for an appendectomy that was done on 5/20  Taken back to the OR on 5/23 in the setting of a small bowel perforation for small bowel resection  Surgical cultures from 5/23 are growing Candida krusei, intrinsically resistant to fluconazole  Currently on IV Zosyn and IV micafungin    Recommendations:  Continue IV Zosyn and IV micafungin for  now  Given the fungal peritonitis, she will need 4 weeks of antimicrobial therapy.  We can potentially send her on oral antibiotics; however, she will need to go on micafungin given the intrinsic resistance of Candida krusei to fluconazole  Continue to monitor daily labs for antibiotic toxicities  Further recommendations will depend on the above work-up and clinical progress     The plan of care was discussed with the primary hospital team, Jovanni Monsivais MD     Recommendations were also discussed with the patient; all questions were answered.     Thank you for this consultation. Please don't hesitate to call the ID team for questions or any acute changes in patient's clinical condition.    Please note that this report has been produced using speech recognition software and may contain errors related to that system including, but not limited to, errors in grammar, punctuation, and spelling, as well as words and phrases that possibly may have been recognized inappropriately.  If there are any questions or concerns, contact the dictating provider for clarification.    The  [employer] Cures Act makes medical notes like these available to patients in the interest of transparency. Please be advised this is a medical document. Medical documents are intended to carry relevant information, facts as evident, and the clinical opinion of the practitioner. The medical note is intended as peer to peer communication and may appear blunt or direct. It is written in medical language and may contain abbreviations or verbiage that are unfamiliar.     Anjana Salas MD  [facility]. Tel: [phone]. Fax: [phone].     Merissa RAI Dayo : 1950 MRN: [id number] [facility]: [phone]

## 2024-05-26 NOTE — PROGRESS NOTES
Pt is alert and oriented,on room air without signs of sob,vss  LS are clear upon assessment  Mild surgical pain, controlled with prn pain meds  Pt denies nausea, tolerating clears  Active bowel sounds,abdomen soft, passing gas  Midline incision with staples MANISH, CDI, CHG cleansed.   TYREL with serosanguinous output, split gauze CDI. Pt voids adequately, ambulates independently. IVF and IV Zosyn as scheduled.  Poc discussed,safety precautions in place,call light in reach

## 2024-05-27 PROCEDURE — 93010 ELECTROCARDIOGRAM REPORT: CPT | Performed by: INTERNAL MEDICINE

## 2024-05-27 PROCEDURE — 93005 ELECTROCARDIOGRAM TRACING: CPT

## 2024-05-27 NOTE — PROGRESS NOTES
[facility] Hospitalist Progress Note                                                                     [facility]   part of [facility]      Merissa Aguilar  5/12/1950    SUBJECTIVE: no chest pain, palpitations, shortness of breath, cough, nausea, vomiting. Patient with post operative pain which is controlled.   Passing gas, having loose Bms  Some pain, drain in place.     OBJECTIVE:  Temp:  [98.2 °F (36.8 °C)-98.6 °F (37 °C)] 98.2 °F (36.8 °C)  Pulse:  [67-73] 68  Resp:  [18] 18  BP: (127-136)/(72-85) 127/72  SpO2:  [98 %-100 %] 98 %  Exam  Gen: No acute distress, alert and oriented   Pulm: Lungs clear bilaterally, normal respiratory effort, no crackles, no wheezing  CV: Heart with regular rate and rhythm, no murmur.   Abd: Abdomen soft, post surgical tenderness , nondistended, bowel sounds present-hypoactive  MSK: no pitting edema or tenderness of the LE  Skin: no new rashes or lesions    Labs:   Recent Labs   Lab 05/21/24  1134 05/22/24  0536 05/23/24  0916 05/24/24  0459 05/25/24  0650   WBC 10.0 6.4 8.5 5.5 4.5   HGB 12.5 12.2 10.4* 9.0* 8.7*   MCV 83.4 86.5 85.3 89.0 85.5   .0 168.0 180.0 202.0 223.0       Recent Labs   Lab 05/21/24  1134 05/22/24  0536 05/23/24  0453 05/24/24  0459 05/25/24  0650 05/25/24  1628    139 142 139 141  --    K 3.9 3.7 3.5 3.3* 3.0*  3.0* 3.8    105 108 109 109  --    CO2 30.0 29.0 28.0 27.0 25.0  --    BUN 9 8* 9 10 6*  --    CREATSERUM 0.75 0.70 0.62 0.68 0.58  --    CA 8.0* 7.7* 8.0* 7.1* 7.3*  --    MG  --   --  1.9 1.5* 2.4  --    PHOS  --   --   --  2.8  --   --    * 118* 101* 87 85  --        Recent Labs   Lab 05/24/24  0459   ALT 12*   AST 9*   ALB 1.5*       Recent Labs   Lab 05/24/24  0816   PGLU 82       Meds:   Scheduled:    furosemide  20 mg Oral Daily    enoxaparin  40 mg Subcutaneous Daily    micafungin  100 mg Intravenous Q24H    famotidine  20 mg Oral BID    Or    famotidine  20 mg  Intravenous BID    magnesium oxide  400 mg Oral Daily    piperacillin-tazobactam  3.375 g Intravenous Q8H     Continuous Infusions:    sodium chloride Stopped (05/26/24 0632)     PRN:   acetaminophen    [Transfer Hold] bisacodyl    benzocaine-menthol    polyethylene glycol (PEG 3350)    sennosides    phenol    oxyCODONE **OR** oxyCODONE    HYDROmorphone **OR** HYDROmorphone    [Transfer Hold] simethicone    [Transfer Hold] ondansetron    [Transfer Hold] metoclopramide    ASSESSMENT / PLAN:   74 year old female with no significant past medical history presenting with planned surgical intervention for recent acute appendicitis with perforation/abscess.      Acute Appendicitis/abscess/small bowl perf s/p ex lap, lysis of adhesions, small bowel resection 5/23/2024  -pt admitted early April with this, received 2 drains and dc on iv abx --> completed course prior to admission  -POD # 7 s/p diagnostic laparoscopy, open appendectomy, extensive lysis of adhesions  -surgery following  -diet, wound care, drain care per surgery  -iv zosyn  -surgical cultures growing yeast >> iv micafungin started >> ID following  -stop IVF  -lasix 20 daily x 2 days     Acute post Operative pain  -as per surgery  -iv hydromorphone prn  -po oxy prn     Quality:  DVT Prophylaxis: scd, heparin sq  CODE status: Full per chart  Pike:      Plan of care discussed with patient and staff     DISPO:  Cont inpt care  Will follow along      Shanice Knapp Hospitalist  Pager [phone]  Answering Service number: [phone]

## 2024-05-27 NOTE — PROGRESS NOTES
[facility]  Progress Note    Merissa Oshea Patient Status:  Inpatient    1950 MRN [id number]   Location [facility] 3NW-A Attending Jovanni Monsivais MD   Hosp Day # 6 PCP Cruz Puckett MD     Subjective:    Tolerating diet    Objective/Physical Exam:    Vital Signs:        2024     7:45 PM 2024     6:00 AM   Vitals History   /85 127/72   BP Location Left arm Left arm   Pulse 67 68   Resp 18 18   Temp 98.5 °F (36.9 °C) 98.2 °F (36.8 °C)   SpO2 99 % 98 %        General: Alert,  Cooperative.  No apparent distress.  Skin: Normal texture and turgor.  HEENT: Exam is unremarkable.  Without scleral icterus.    Neck: No JVD. Supple.   Lungs: Clear to auscultation bilaterally.  Cardiac: Regular rate and rhythm. No murmur.  Abdomen: Soft with mild incisional tenderness  Extremities:  No lower extremity edema noted.   Neurologic:  No focal neurologic deficit.    Labs:    Recent Labs   Lab 24  1134 24  0536 24  0916 24  0459 24  0650   WBC 10.0 6.4 8.5 5.5 4.5   HGB 12.5 12.2 10.4* 9.0* 8.7*   MCV 83.4 86.5 85.3 89.0 85.5   .0 168.0 180.0 202.0 223.0     Recent Labs   Lab 24  1134 24  0536 24  0453 24  0459 24  0650 24  1628    139 142 139 141  --    K 3.9 3.7 3.5 3.3* 3.0*  3.0* 3.8    105 108 109 109  --    CO2 30.0 29.0 28.0 27.0 25.0  --    BUN 9 8* 9 10 6*  --    CREATSERUM 0.75 0.70 0.62 0.68 0.58  --    * 118* 101* 87 85  --    CA 8.0* 7.7* 8.0* 7.1* 7.3*  --    MG  --   --  1.9 1.5* 2.4  --    PHOS  --   --   --  2.8  --   --        RADIOLOGY:    XR CHEST AP PORTABLE  (CPT=71045)    Result Date: 2024  PROCEDURE:  XR CHEST AP PORTABLE  (CPT=71045)  TECHNIQUE:  AP chest radiograph was obtained.  COMPARISON:  [facility] , CT, CT ABSCESS INJECTION VIA EXISTING CATH (CPT=76080/21068), 2024, 1:08 PM.  [facility], XR, XR CHEST PA + LAT CHEST (CPT=71046), 3/05/2023, 10:31 AM.  INDICATIONS:  PICC  placement  PATIENT STATED HISTORY: (As transcribed by Technologist)  Patient offered no additional history at this time.    FINDINGS:  Right-sided PICC tip at the cavoatrial junction. Normal heart size and pulmonary vascularity. No pleural effusion or pneumothorax. No lobar consolidation.  Nonspecific gaseous distention of the bowel the upper abdomen.  Free air the upper abdomen related to recent abdominal surgery.            CONCLUSION:  Right-sided PICC tip at the cavoatrial junction.  Free air in the upper abdomen related to recent abdominal surgery.   LOCATION:  [facility]      Dictated by (CST): Mele Couch MD on 5/21/2024 at 10:04 AM     Finalized by (CST): Mele Couch MD on 5/21/2024 at 10:05 AM       CT ABSCESS INJECTION VIA EXISTING CATH (CPT=76080/69526)    Result Date: 5/8/2024  PROCEDURE:  CT ABSCESS INJECTION VIA EXISTING CATH (CPT=76080/99559)  COMPARISON:  [facility] , CT, CT DRAIN ABSCESS APPENDIX (CPT=49406), 4/20/2024, 10:49 AM.  INDICATIONS:  K35.211 Acute appendicitis with perforation, generalized peritonitis, and abscess, unspecified whether gangrene present  TECHNIQUE:  Witnessed verbal and written informed consent was obtained.  Time out was performed by the staff. A diluted contrast injection of abscess via existing drainage catheter was performed in the usual sterile manner with CT imaging. Dose reduction  techniques were used. Dose information is transmitted to the ACR (American College of Radiology) NRDR (National Radiology Data Registry) which includes the Dose Index Registry.  PATIENT STATED HISTORY:(As transcribed by Technologist)  Patient is here to evaluate left buttock drain.   CONTRAST USED:  1cc of Isovue 370  FINDINGS:  CT through the pelvis in the area of the left gluteal drain was performed pre and post injection of contrast material through the drainage catheter.  Pre contrast there is no significant residual cavity, the drainage catheter appears appropriately seated.  Post  contrast injection demonstrates immediate return of contrast material around the catheter site.  There is no significant residual cavity following injection of 10 cc of diluted contrast material.  The catheter was cut and removed in its entirety.  Sterile dressing was applied.    CONCLUSION:  No significant residual abscess cavity or fistula in relation to the left gluteal drainage catheter.  The catheter was removed.             LOCATION:  [facility]   Dictated by (CST): Marco Antonio Tran MD on 5/08/2024 at 1:58 PM     Finalized by (CST): Marco Antonio Tran MD on 5/08/2024 at 1:59 PM         PROBLEM LIST:    Patient Active Problem List   Diagnosis    Lumbar stenosis    Decreased strength of upper extremity    Plantar neuroma of left foot    Capsular contracture of breast implant    Depressive disorder    Hearing loss    Martinez's metatarsalgia    Bradycardia    Diarrhea of presumed infectious origin    Acute appendicitis with perforation, generalized peritonitis, and abscess, unspecified whether gangrene present    Dehydration    Hypokalemia    Hyponatremia    Postoperative pain       IMPRESSION:    Status post partial small bowel resection    PLAN:    Plan for discharge tomorrow once her outpatient IV antibiotic therapy has been approved and set up    Joey Valdez MD  5/27/2024  11:30 AM

## 2024-05-27 NOTE — PROGRESS NOTES
Infectious Disease Progress Note      Date of admission: 5/20/2024  7:14 AM     Reason for consult: Peritonitis    Subjective: Feels well.  Abdominal pain continues to improve.  Having regular bowel movements and gas    The rest of the systems were reviewed and found to be negative except was mentioned above    Interval events: This is a 74-year-old female patient, with a complicated history of an intra-abdominal infection that started off with an appendicitis with an abscess.  Underwent drainage back in April, eventually came here for an appendectomy, course complicated by a small bowel perforation for which she underwent a small bowel resection.  Surgical cultures now growing Candida krusei.  Currently on IV Zosyn and IV micafungin.    Medications:    furosemide    enoxaparin    acetaminophen    micafungin    [Transfer Hold] bisacodyl    benzocaine-menthol    polyethylene glycol (PEG 3350)    sennosides    famotidine **OR** famotidine    magnesium oxide    phenol    oxyCODONE **OR** oxyCODONE    HYDROmorphone **OR** HYDROmorphone    sodium chloride    [Transfer Hold] simethicone    [Transfer Hold] ondansetron    [Transfer Hold] metoclopramide    piperacillin-tazobactam     Allergies:  No Known Allergies    Physical Exam:  Vitals:    05/27/24 0600   BP: 127/72   Pulse: 68   Resp: 18   Temp: 98.2 °F (36.8 °C)     Vitals signs and nursing note reviewed.   Constitutional:       Appearance: Normal appearance.   HENT:      Head: Normocephalic and atraumatic.      Mouth: Mucous membranes are moist.   Neck:      Musculoskeletal: Neck supple.   Cardiovascular:      Rate and Rhythm: Normal rate.   Pulmonary:      Effort: Pulmonary effort is normal. No respiratory distress.   Abdominal:      General: Abdomen is flat. There is no distension.  Surgical incision noted without any signs of infection.  Drains noted.     Palpations: Abdomen is soft. There is no mass.      Tenderness: There is no tenderness. There is no guarding  or rebound.      Hernia: No hernia is present.   Musculoskeletal:      Right lower leg: No edema.      Left lower leg: No edema.   Skin:     General: Skin is warm and dry.   Neurological:      General: No focal deficit present.      Mental Status: Alert and oriented to person, place, and time.       Laboratory data:  I have reviewed all the lab results independently.        Recent Labs   Lab 05/25/24  0650   RBC 2.96*   HGB 8.7*   HCT 25.3*   MCV 85.5   MCH 29.4   MCHC 34.4   RDW 13.0   NEPRELIM 2.75   WBC 4.5   .0      Microbiology data:  Hospital Encounter on 05/20/24   1. Body Fluid Cult Aerobic and Anaerobic     Status: Abnormal    Collection Time: 05/23/24 12:08 PM    Specimen: Peritoneal fluid; Body fluid, unspecified   Result Value Ref Range    Body Fluid Culture Result Candida krusei (A) N/A    Body Fluid Smear No WBCs seen (A) N/A    Body Fluid Smear No organisms seen (A) N/A    Body Fluid Smear Gram stain of positive bottle shows: (A) N/A    Body Fluid Smear Yeast (A) N/A    Collected 4/20/2024 11:40       Status: Final result    Specimen Information: Subdiaphragmatic abscess   0 Result Notes  AEROBIC CULTURE RESULT 4+ growth Escherichia coli Abnormal       4+ growth Streptococcus anginosus Abnormal                AEROBIC SMEAR 4+ Gram Negative Rods      4+ Gram Positive Rods      4+ Gram positive cocci in pairs and clusters      1+ WBCs seen              Resulting Agency: [facility] ([facility])     Susceptibility     Escherichia coli     Not Specified    Ampicillin >=32 Resistant    Ampicillin + Sulbactam >=32 Resistant    Cefazolin <=4 Sensitive    Ciprofloxacin <=0.25 Sensitive    Gentamicin <=1 Sensitive    Levofloxacin 1 Sensitive    Meropenem <=0.25 Sensitive    Piperacillin + Tazobactam <=4 Sensitive    Trimethoprim/Sulfa <=20 Sensitive          Collected 4/20/2024 11:21       Status: Final result    Specimen Information: Subdiaphragmatic abscess   0 Result Notes  AEROBIC CULTURE RESULT 4+ growth  Streptococcus anginosus Abnormal       1+ growth Escherichia coli Abnormal    For susceptibility results see previous culture.   1+ growth Pseudomonas aeruginosa Abnormal                AEROBIC SMEAR 4+ Gram Negative Rods      4+ Gram Positive Rods      4+ Gram positive cocci in pairs and clusters      4+ WBCs seen              Resulting Agency: [facility] ([facility])     Susceptibility       Pseudomonas aeruginosa     Not Specified    Cefepime 4 Sensitive    Ceftazidime 2 Sensitive    Ciprofloxacin <=1 Sensitive    Levofloxacin 0.5 Sensitive    Meropenem <=1 Sensitive    Piperacillin + Tazobactam <=4 Sensitive    Tobramycin <=1 Sensitive          Collected 4/20/2024 11:21       Status: Final result    Specimen Information: Subdiaphragmatic abscess   0 Result Notes  Anaerobic Culture 4+ growth Bacteroides thetaiotaomicron Abnormal       4+ growth Parabacteroides distasonis Abnormal         Impression:  Merissa Oshea is a 74 year old female with    Small bowel perforation in the setting of recent appendectomy with threat to life  The patient originally presented back in April with acute appendicitis with abdominal abscess  Status post drainage on 4/20 with cultures then growing Pseudomonas aeruginosa, Bacteroides species, and E. coli along with strep species  Was discharged on IV meropenem  One of the abscesses resolved; however, the other abscess did not  Brought back to the hospital for an appendectomy that was done on 5/20  Taken back to the OR on 5/23 in the setting of a small bowel perforation for small bowel resection  Surgical cultures from 5/23 are growing Candida krusei, intrinsically resistant to fluconazole  Currently on IV Zosyn and IV micafungin    Recommendations:  Continue IV Zosyn and IV micafungin for now  She will need 4 weeks of micafungin 100 daily through 6/19/2024  She will need to finish a total of 2 weeks of antibacterial therapy through 6/5/2024, will plan on discharge on Levaquin and  metronidazole  Check EKG to confirm normal Qtc  Weekly CBC with differential and CMP.  Fax results to [facility]. Fax: [phone]. Tel: [phone].  PICC line care as per protocol.  Follow-up with infectious disease in 2 weeks after discharge  Continue to monitor daily labs for antibiotic toxicities  Further recommendations will depend on the above work-up and clinical progress     The plan of care was discussed with the primary hospital team, Jovanni Monsivais MD     Recommendations were also discussed with the patient; all questions were answered.     Thank you for this consultation. Please don't hesitate to call the ID team for questions or any acute changes in patient's clinical condition.    Please note that this report has been produced using speech recognition software and may contain errors related to that system including, but not limited to, errors in grammar, punctuation, and spelling, as well as words and phrases that possibly may have been recognized inappropriately.  If there are any questions or concerns, contact the dictating provider for clarification.    The  [employer] Cures Act makes medical notes like these available to patients in the interest of transparency. Please be advised this is a medical document. Medical documents are intended to carry relevant information, facts as evident, and the clinical opinion of the practitioner. The medical note is intended as peer to peer communication and may appear blunt or direct. It is written in medical language and may contain abbreviations or verbiage that are unfamiliar.     MD [facility]. Tel: [phone]. Fax: [phone].     Merissa RAI Dayo : 1950 MRN: [id number] [facility]: [id number]

## 2024-05-27 NOTE — PROGRESS NOTES
Pt is alert and oriented,on room air without signs of sob,vss  LS are clear upon assessment, using IS approprietly  Surgica pain is controlled w/o pain meds tonight  Active bowel sounds,abdomen soft, reports passing gas. Toleratings Fulls w/o nausea.  Midline incisions with [place] MANISH, CDI, cleansed w/ CHG wipes  TYREL to the R with serous output. Split dressing changed.  Zosyn per schedule. Pt ambulates independently in halls.  Poc discussed,safety precautions in place,call light in reach

## 2024-05-27 NOTE — CM/SW NOTE
05/27/24 1500   CM/SW Referral Data   Referral Source Social Work (self-referral)   Reason for Referral Discharge planning   Informant EMR;Clinical Staff Member   Patient Status Prior to Admission   Independent with ADLs and Mobility Yes   Services in place prior to admission Home Health Care;Infusion   Home Health Provider Info [facility]   [facility]fusion Provider [facility]   Discharge Needs   Anticipated D/C needs Home health care;Infusion care     SW order acknowledged. Pt will need IV abx for dc, completed chart review. Pt is current with [facility] RN, and has hx with [facility]. Infusion referral sent to [facility], uploaded final abx, picc line information, and flush order.    Awaiting response from [facility].    SW/CM to remain available for dc planning, and/or additional need for support.    MONSTER Baxter  Discharge Planner  [id number]

## 2024-05-27 NOTE — DIETARY NOTE
[facility]   part of [facility]    NUTRITION ASSESSMENT    Pt does not meet malnutrition criteria at this time.      NUTRITION INTERVENTION:    Meal and Snacks - Monitor and encourage adequate PO intake.       PATIENT STATUS:Doing well this AM. Ate 1/2 omelet. Has appetite. +BM. Offered ONS will think about it. No new wt.   5/23- Pt taken to OR for ex lap due to possible bowel perforation. NPO now since admission. ONS once diet advances. RD to follow for diet advancement  05/21/24 74 year old female presented 5/20 for planned surgical intervention for recent acute appendicitis with perforation/abscess. Patient was admitted a few weeks ago with a diagnosis of acute appendicitis with abscess formation.  Patient had 2 drains placed and was placed on IV antibiotics and discharged home.  Now s/p diagnostic laparoscopy, open appendectomy, extensive lysis of adhesions. Chart reviewed for +MST/low BMI. Address recent wt loss, pt states \"I just finished a bowel prep\". Per EPIC, pt is down 13#(11%) x 1 month-significant per standards. Pt is a vegetarian. Does report decreased PO with recent appendicitis. Declined ONS. Pt states she found items on the menu okay at last visit. Currently NPO. RD to follow      ANTHROPOMETRICS:  Ht: 162.6 cm (5' 4\")  Wt: 47.6 kg (105 lb).   BMI: Body mass index is 18.02 kg/m².  IBW: 54 kg      WEIGHT HISTORY:   Weight loss: Yes, 13# (11%) x 1 month    Wt Readings from Last 10 Encounters:   05/21/24 47.6 kg (105 lb)   04/19/24 53.5 kg (118 lb)   09/24/23 53.5 kg (118 lb)   03/05/23 53.5 kg (118 lb)   01/04/23 53.5 kg (118 lb)   01/31/22 54.4 kg (120 lb)   06/08/21 54.9 kg (121 lb)   12/31/20 55.8 kg (123 lb)   12/10/19 55.8 kg (123 lb)   07/11/19 55.8 kg (123 lb)        NUTRITION:  Diet:       Procedures    Low Fiber/Soft diet Low Fiber/Soft; Is Patient on Accuchecks? No      Food Allergies: No  Cultural/Ethnic/Congregation Preferences Addressed: Yes    Percent Meals Eaten (last 3 days)        Date/Time Percent Meals Eaten (%)    05/24/24 0113 0 %    05/24/24 0552 0 %    05/25/24 1343 100 %    05/26/24 1600 50 %            GI system review: WNL Last BM: 5/26  Skin and wounds: intact    NUTRITION RELATED PHYSICAL FINDINGS:     1. Body Fat/Muscle Mass: no wasting noted     2. Fluid Accumulation: none per RN documentation     NUTRITION PRESCRIPTION: 47.6kg  Calories:  6875-7461 calories/day (30-35 kcal/kg)  Protein: 56-70 grams protein/day (1.2-1.5 grams protein per kg)  Fluid: ~1 ml/kcal or per MD discretion    NUTRITION DIAGNOSIS/PROBLEM:  Underweight related to decreased intake as evidenced by documented/reported unintentional weight loss and low BMI      MONITOR AND EVALUATE/NUTRITION GOALS:  PO intake of 75% of meals TID - Met, continues  Weight stable within 1 to 2 lbs during admission - Ongoing  Start alternative nutrition in 24-48 hrs if diet is not able to advance- Resolved      MEDICATIONS:  Reviewed    LABS:  Reviewed    Pt is at Moderate nutrition risk    Sowmya Jones RD, LDN  Clinical Nutrition  [id number]

## 2024-05-28 VITALS
TEMPERATURE: 99 F | HEIGHT: 64 IN | BODY MASS INDEX: 17.93 KG/M2 | DIASTOLIC BLOOD PRESSURE: 74 MMHG | WEIGHT: 105 LBS | SYSTOLIC BLOOD PRESSURE: 121 MMHG | HEART RATE: 69 BPM | OXYGEN SATURATION: 99 % | RESPIRATION RATE: 20 BRPM

## 2024-05-28 LAB
ATRIAL RATE: 68 BPM
P AXIS: 30 DEGREES
P-R INTERVAL: 136 MS
Q-T INTERVAL: 438 MS
QRS DURATION: 84 MS
QTC CALCULATION (BEZET): 465 MS
R AXIS: 18 DEGREES
T AXIS: 28 DEGREES
VENTRICULAR RATE: 68 BPM

## 2024-05-28 RX ORDER — LEVOFLOXACIN 750 MG/1
750 TABLET, FILM COATED ORAL
Status: DISCONTINUED | OUTPATIENT
Start: 2024-05-28 | End: 2024-05-28

## 2024-05-28 RX ORDER — LEVOFLOXACIN 750 MG/1
750 TABLET, FILM COATED ORAL DAILY
Qty: 9 TABLET | Refills: 0 | Status: SHIPPED | OUTPATIENT
Start: 2024-05-28 | End: 2024-06-06

## 2024-05-28 RX ORDER — METRONIDAZOLE 500 MG/1
500 TABLET ORAL 2 TIMES DAILY
Qty: 18 TABLET | Refills: 0 | Status: SHIPPED | OUTPATIENT
Start: 2024-05-28 | End: 2024-06-06

## 2024-05-28 RX ORDER — METRONIDAZOLE 500 MG/1
500 TABLET ORAL EVERY 12 HOURS SCHEDULED
Status: DISCONTINUED | OUTPATIENT
Start: 2024-05-28 | End: 2024-05-28

## 2024-05-28 NOTE — PROGRESS NOTES
[facility] Hospitalist Progress Note                                                                     [facility]   part of [facility]      Merissa Aguilar  5/12/1950    SUBJECTIVE: no chest pain, palpitations, shortness of breath, cough, nausea, vomiting. Patient with post operative pain which is controlled.   Passing gas, having Bms  Some pain, drain in place.     OBJECTIVE:  Temp:  [97.8 °F (36.6 °C)-98.5 °F (36.9 °C)] 97.8 °F (36.6 °C)  Pulse:  [61-75] 61  Resp:  [16-18] 18  BP: (127-141)/(79-84) 139/79  SpO2:  [97 %-100 %] 97 %  Exam  Gen: No acute distress, alert and oriented   Pulm: Lungs clear bilaterally, normal respiratory effort, no crackles, no wheezing  CV: Heart with regular rate and rhythm, no murmur.   Abd: Abdomen soft, post surgical tenderness , nondistended, bowel sounds present-hypoactive  MSK: no pitting edema or tenderness of the LE  Skin: no new rashes or lesions    Labs:   Recent Labs   Lab 05/21/24  1134 05/22/24  0536 05/23/24  0916 05/24/24  0459 05/25/24  0650   WBC 10.0 6.4 8.5 5.5 4.5   HGB 12.5 12.2 10.4* 9.0* 8.7*   MCV 83.4 86.5 85.3 89.0 85.5   .0 168.0 180.0 202.0 223.0       Recent Labs   Lab 05/21/24  1134 05/22/24  0536 05/23/24  0453 05/24/24  0459 05/25/24  0650 05/25/24  1628    139 142 139 141  --    K 3.9 3.7 3.5 3.3* 3.0*  3.0* 3.8    105 108 109 109  --    CO2 30.0 29.0 28.0 27.0 25.0  --    BUN 9 8* 9 10 6*  --    CREATSERUM 0.75 0.70 0.62 0.68 0.58  --    CA 8.0* 7.7* 8.0* 7.1* 7.3*  --    MG  --   --  1.9 1.5* 2.4  --    PHOS  --   --   --  2.8  --   --    * 118* 101* 87 85  --        Recent Labs   Lab 05/24/24  0459   ALT 12*   AST 9*   ALB 1.5*       Recent Labs   Lab 05/24/24  0816   PGLU 82       Meds:   Scheduled:    furosemide  20 mg Oral Daily    enoxaparin  40 mg Subcutaneous Daily    micafungin  100 mg Intravenous Q24H    famotidine  20 mg Oral BID    Or    famotidine  20 mg  Intravenous BID    magnesium oxide  400 mg Oral Daily    piperacillin-tazobactam  3.375 g Intravenous Q8H     Continuous Infusions:    sodium chloride Stopped (05/26/24 0632)     PRN:   acetaminophen    [Transfer Hold] bisacodyl    benzocaine-menthol    polyethylene glycol (PEG 3350)    sennosides    phenol    oxyCODONE **OR** oxyCODONE    HYDROmorphone **OR** HYDROmorphone    [Transfer Hold] simethicone    [Transfer Hold] ondansetron    [Transfer Hold] metoclopramide    ASSESSMENT / PLAN:   74 year old female with no significant past medical history presenting with planned surgical intervention for recent acute appendicitis with perforation/abscess.      Acute Appendicitis/abscess/small bowl perf s/p ex lap, lysis of adhesions, small bowel resection 5/23/2024  -pt admitted early April with this, received 2 drains and dc on iv abx --> completed course prior to admission  -POD # 8 s/p diagnostic laparoscopy, open appendectomy, extensive lysis of adhesions  -surgery following  -diet, wound care, drain care per surgery  -iv zosyn  -surgical cultures growing yeast >> iv micafungin started >> ID following  -stop IVF  -lasix 20 daily x 2 days; swelling BLE improved     Acute post Operative pain  -as per surgery  -iv hydromorphone prn  -po oxy prn     Quality:  DVT Prophylaxis: scd, heparin sq  CODE status: Full per chart  Pike:      Plan of care discussed with patient and staff     DISPO:  Cont inpt care  Dc planning in place   Likely dc home today per surgery  Will follow along        Shanice Knapp Hospitalist  Pager [phone]  Answering Service number: [phone]

## 2024-05-28 NOTE — CM/SW NOTE
05/28/24 1200   Discharge disposition   Expected discharge disposition Home-Health   Post Acute Care Provider [facility]   [facility]   Discharge transportation Private car     Pt discussed in rounds and per RN, she is ready for discharge today.    Noted order for OP IV Micafungin.  Sent to [facility] via [facility]. They quoted cost of $191/week  Pt current with [facility] RN for labs/line care    Met w/pt at the bedside to confirm plans and she is agreeable to plan/cost of medication.   She reports she is independent and active and able to manage her own IV antibiotics.  Plan will be to have  RN see her tomorrow to train her on new med administration.      Notified [facility] liaison, Jessika,  of discharge and she has scheduled an RN to see pt tomorrow morning.    [facility] liaison Rhonda will contact pt to schedule delivery of medication and supplies    / to remain available for support and/or discharge planning.     Patt Hall MBA MSN, RN CTL/  [id number]

## 2024-05-28 NOTE — PROGRESS NOTES
Alert and oriented x4. On room air. Continuous pulse ox. Vital signs stable.  GI: Abdomen soft, nondistended. Passing gas. Last BM: 5/26  : Voids.  Denies any pain at this time  Up ad jim.  Drains: TYREL x1 on right, output serous  Incisions: midline incision with staples, clean, dry, intact. Old TYREL site on left abdomen 4x4 gauze clean dry intact  Diet: Tolerating soft diet  IVF running per order.  All appropriate safety measures in place. All questions and concerns addressed.    Plan to discharge home 5/28 pending home antibiotics.

## 2024-05-28 NOTE — PLAN OF CARE
Patient alert and oriented x4. VSS, on RA. Ambulating independently in room. No report of pain this AM. Midline incision site with staples, MANISH, no drainage present. Tolerating soft diet, denies N/V.   TYREL drain to bulb suction, drain gauze dressing in place. PICC to [facility], dressing changed this AM.       Plan: PO antibiotics today, discharge home when HHC and meds arranged.

## 2024-05-28 NOTE — PROGRESS NOTES
Infectious Disease Progress Note      Date of admission: 5/20/2024  7:14 AM     Reason for consult: Peritonitis    Subjective: Feels well.  Abdominal pain continues to improve.  Having regular bowel movements and gas    The rest of the systems were reviewed and found to be negative except was mentioned above    Interval events: This is a 74-year-old female patient, with a complicated history of an intra-abdominal infection that started off with an appendicitis with an abscess.  Underwent drainage back in April, eventually came here for an appendectomy, course complicated by a small bowel perforation for which she underwent a small bowel resection.  Surgical cultures now growing Candida krusei.  Currently on IV Zosyn and IV micafungin.    Medications:    levoFLOXacin    metRONIDAZOLE    enoxaparin    acetaminophen    micafungin    [Transfer Hold] bisacodyl    benzocaine-menthol    polyethylene glycol (PEG 3350)    sennosides    famotidine **OR** famotidine    magnesium oxide    phenol    oxyCODONE **OR** oxyCODONE    HYDROmorphone **OR** HYDROmorphone    sodium chloride    [Transfer Hold] simethicone    [Transfer Hold] ondansetron    [Transfer Hold] metoclopramide     Allergies:  No Known Allergies    Physical Exam:  Vitals:    05/28/24 0545   BP: 139/79   Pulse: 61   Resp: 18   Temp: 97.8 °F (36.6 °C)     Vitals signs and nursing note reviewed.   Constitutional:       Appearance: Normal appearance.   HENT:      Head: Normocephalic and atraumatic.      Mouth: Mucous membranes are moist.   Neck:      Musculoskeletal: Neck supple.   Cardiovascular:      Rate and Rhythm: Normal rate.   Pulmonary:      Effort: Pulmonary effort is normal. No respiratory distress.   Abdominal:      General: Abdomen is flat. There is no distension.  Surgical incision noted without any signs of infection.  Drains noted.     Palpations: Abdomen is soft. There is no mass.      Tenderness: There is no tenderness. There is no guarding or  rebound.      Hernia: No hernia is present.   Musculoskeletal:      Right lower leg: No edema.      Left lower leg: No edema.   Skin:     General: Skin is warm and dry.   Neurological:      General: No focal deficit present.      Mental Status: Alert and oriented to person, place, and time.       Laboratory data:  I have reviewed all the lab results independently.        Recent Labs   Lab 05/25/24  0650   RBC 2.96*   HGB 8.7*   HCT 25.3*   MCV 85.5   MCH 29.4   MCHC 34.4   RDW 13.0   NEPRELIM 2.75   WBC 4.5   .0      Microbiology data:  Hospital Encounter on 05/20/24   1. Body Fluid Cult Aerobic and Anaerobic     Status: Abnormal    Collection Time: 05/23/24 12:08 PM    Specimen: Peritoneal fluid; Body fluid, unspecified   Result Value Ref Range    Body Fluid Culture Result Candida krusei (A) N/A    Body Fluid Smear No WBCs seen (A) N/A    Body Fluid Smear No organisms seen (A) N/A    Body Fluid Smear Gram stain of positive bottle shows: (A) N/A    Body Fluid Smear Yeast (A) N/A    Collected 4/20/2024 11:40       Status: Final result    Specimen Information: Subdiaphragmatic abscess   0 Result Notes  AEROBIC CULTURE RESULT 4+ growth Escherichia coli Abnormal       4+ growth Streptococcus anginosus Abnormal                AEROBIC SMEAR 4+ Gram Negative Rods      4+ Gram Positive Rods      4+ Gram positive cocci in pairs and clusters      1+ WBCs seen              Resulting Agency: [facility] ([facility])     Susceptibility     Escherichia coli     Not Specified    Ampicillin >=32 Resistant    Ampicillin + Sulbactam >=32 Resistant    Cefazolin <=4 Sensitive    Ciprofloxacin <=0.25 Sensitive    Gentamicin <=1 Sensitive    Levofloxacin 1 Sensitive    Meropenem <=0.25 Sensitive    Piperacillin + Tazobactam <=4 Sensitive    Trimethoprim/Sulfa <=20 Sensitive          Collected 4/20/2024 11:21       Status: Final result    Specimen Information: Subdiaphragmatic abscess   0 Result Notes  AEROBIC CULTURE RESULT 4+ growth  Streptococcus anginosus Abnormal       1+ growth Escherichia coli Abnormal    For susceptibility results see previous culture.   1+ growth Pseudomonas aeruginosa Abnormal                AEROBIC SMEAR 4+ Gram Negative Rods      4+ Gram Positive Rods      4+ Gram positive cocci in pairs and clusters      4+ WBCs seen              Resulting Agency: [facility] ([facility])     Susceptibility       Pseudomonas aeruginosa     Not Specified    Cefepime 4 Sensitive    Ceftazidime 2 Sensitive    Ciprofloxacin <=1 Sensitive    Levofloxacin 0.5 Sensitive    Meropenem <=1 Sensitive    Piperacillin + Tazobactam <=4 Sensitive    Tobramycin <=1 Sensitive          Collected 4/20/2024 11:21       Status: Final result    Specimen Information: Subdiaphragmatic abscess   0 Result Notes  Anaerobic Culture 4+ growth Bacteroides thetaiotaomicron Abnormal       4+ growth Parabacteroides distasonis Abnormal         Impression:  Merissa Oshea is a 74 year old female with    Small bowel perforation in the setting of recent appendectomy with threat to life  The patient originally presented back in April with acute appendicitis with abdominal abscess  Status post drainage on 4/20 with cultures then growing Pseudomonas aeruginosa, Bacteroides species, and E. coli along with strep species  Was discharged on IV meropenem  One of the abscesses resolved; however, the other abscess did not  Brought back to the hospital for an appendectomy that was done on 5/20  Taken back to the OR on 5/23 in the setting of a small bowel perforation for small bowel resection  Surgical cultures from 5/23 are growing Candida krusei, intrinsically resistant to fluconazole  Currently on IV Zosyn and IV micafungin    Recommendations:  Continue IV micafungin 100 mg daily  Discontinue IV Zosyn  Start Levaquin 750 mg p.o. daily along with Flagyl 500 mg p.o. twice daily  She will need 4 weeks of micafungin 100 daily through 6/19/2024  She will need to finish a total of 2  weeks of Levaquin and metronidazole through 2024  Weekly CBC with differential and CMP.  Fax results to [facility]. Fax: [phone]. Tel: [phone].  PICC line care as per protocol.  Follow-up with infectious disease in 2 weeks after discharge  Okay to discharge from ID perspective once okay with the other services  Continue to monitor daily labs for antibiotic toxicities    The plan of care was discussed with the primary hospital team, Jovanni Monsivais MD     Recommendations were also discussed with the patient; all questions were answered.     Thank you for this consultation. Please don't hesitate to call the ID team for questions or any acute changes in patient's clinical condition.    Please note that this report has been produced using speech recognition software and may contain errors related to that system including, but not limited to, errors in grammar, punctuation, and spelling, as well as words and phrases that possibly may have been recognized inappropriately.  If there are any questions or concerns, contact the dictating provider for clarification.    The  [employer] C[employer] Act makes medical notes like these available to patients in the interest of transparency. Please be advised this is a medical document. Medical documents are intended to carry relevant information, facts as evident, and the clinical opinion of the practitioner. The medical note is intended as peer to peer communication and may appear blunt or direct. It is written in medical language and may contain abbreviations or verbiage that are unfamiliar.     MD [facility]. Tel: [phone]. Fax: [phone].     Merissa RAI Dayo : 1950 MRN: [id number] [facility]: [id number]

## 2024-05-28 NOTE — PROGRESS NOTES
AVS reviewed, Picc line dsg changed, TYREL drain removed, meds delivered to bedside, will dc home w/ [facility] once daughter arrives, follow up w/ Dr. Monsivais as directed, verbalized understanding of dscharge instructions.

## 2024-06-03 ENCOUNTER — LAB REQUISITION (OUTPATIENT)
Dept: LAB | Age: 74
End: 2024-06-03
Payer: MEDICARE

## 2024-06-03 DIAGNOSIS — K35.211 ACUTE APPENDICITIS WITH GENERALIZED PERITONITIS, WITH PERFORATION AND ABSCESS: ICD-10-CM

## 2024-06-03 LAB
ALBUMIN SERPL-MCNC: 3.9 G/DL (ref 3.2–4.8)
ALBUMIN/GLOB SERPL: 1.5 {RATIO} (ref 1–2)
ALP LIVER SERPL-CCNC: 76 U/L
ALT SERPL-CCNC: 19 U/L
ANION GAP SERPL CALC-SCNC: 8 MMOL/L (ref 0–18)
AST SERPL-CCNC: 26 U/L (ref ?–34)
BASOPHILS # BLD AUTO: 0.03 X10(3) UL (ref 0–0.2)
BASOPHILS NFR BLD AUTO: 0.6 %
BILIRUB SERPL-MCNC: 0.4 MG/DL (ref 0.2–1.1)
BUN BLD-MCNC: 12 MG/DL (ref 9–23)
BUN/CREAT SERPL: 16.9 (ref 10–20)
CALCIUM BLD-MCNC: 9.4 MG/DL (ref 8.7–10.4)
CHLORIDE SERPL-SCNC: 102 MMOL/L (ref 98–112)
CO2 SERPL-SCNC: 26 MMOL/L (ref 21–32)
CREAT BLD-MCNC: 0.71 MG/DL
DEPRECATED RDW RBC AUTO: 45.1 FL (ref 35.1–46.3)
EGFRCR SERPLBLD CKD-EPI 2021: 90 ML/MIN/1.73M2 (ref 60–?)
EOSINOPHIL # BLD AUTO: 0.06 X10(3) UL (ref 0–0.7)
EOSINOPHIL NFR BLD AUTO: 1.2 %
ERYTHROCYTE [DISTWIDTH] IN BLOOD BY AUTOMATED COUNT: 14.1 % (ref 11–15)
FASTING STATUS PATIENT QL REPORTED: NO
GLOBULIN PLAS-MCNC: 2.6 G/DL (ref 2–3.5)
GLUCOSE BLD-MCNC: 83 MG/DL (ref 70–99)
HCT VFR BLD AUTO: 32.5 %
HGB BLD-MCNC: 10.8 G/DL
IMM GRANULOCYTES # BLD AUTO: 0.02 X10(3) UL (ref 0–1)
IMM GRANULOCYTES NFR BLD: 0.4 %
LYMPHOCYTES # BLD AUTO: 1.14 X10(3) UL (ref 1–4)
LYMPHOCYTES NFR BLD AUTO: 23.2 %
MCH RBC QN AUTO: 29.3 PG (ref 26–34)
MCHC RBC AUTO-ENTMCNC: 33.2 G/DL (ref 31–37)
MCV RBC AUTO: 88.3 FL
MONOCYTES # BLD AUTO: 0.56 X10(3) UL (ref 0.1–1)
MONOCYTES NFR BLD AUTO: 11.4 %
NEUTROPHILS # BLD AUTO: 3.1 X10 (3) UL (ref 1.5–7.7)
NEUTROPHILS # BLD AUTO: 3.1 X10(3) UL (ref 1.5–7.7)
NEUTROPHILS NFR BLD AUTO: 63.2 %
OSMOLALITY SERPL CALC.SUM OF ELEC: 281 MOSM/KG (ref 275–295)
PLATELET # BLD AUTO: 482 10(3)UL (ref 150–450)
POTASSIUM SERPL-SCNC: 3.5 MMOL/L (ref 3.5–5.1)
PROT SERPL-MCNC: 6.5 G/DL (ref 5.7–8.2)
RBC # BLD AUTO: 3.68 X10(6)UL
SODIUM SERPL-SCNC: 136 MMOL/L (ref 136–145)
WBC # BLD AUTO: 4.9 X10(3) UL (ref 4–11)

## 2024-06-03 PROCEDURE — 80053 COMPREHEN METABOLIC PANEL: CPT | Performed by: INTERNAL MEDICINE

## 2024-06-03 PROCEDURE — 85025 COMPLETE CBC W/AUTO DIFF WBC: CPT | Performed by: INTERNAL MEDICINE

## 2024-06-03 NOTE — CM/SW NOTE
SW received a VM from pt ([phone]) stating she is having issues with her at home infusion due to her PICC line. Pt was inquiring about going to the infusion clinic directly.    RYLEE reached out to [employer], pt's infusion company, and spoke with Rhonda ([phone]). Rhonda stated since pt has Medicare they are not able to services her at their clinics. Rhonda also stated that since pt is active with [facility] ([facility]), pt should be seen by [facility] RN to evaluate pt's issue with the PICC line.    RYLEE sent a message in [employer] to [facility] liaisonBrook, regarding pt's issue. RYLEE requested for [facility] RN to reach out to pt or to do a visit with pt to evaluate the situation. Brook stated she would reach out to her team and have someone call pt.    RYLEE called pt ([phone]) and discussed the issues she's experiencing. Pt reports she is having issues on and off with the PICC line working properly and not wanting to flush. Pt stated that the [facility] RN saw her today and the line was working fine, but it is not working properly as of now. RYLEE informed pt that she sent a message to [facility] liaisonBrook, regarding the issue. Infomred pt that brook will be reaching out to her team to have someone call her and discuss the issue.     Pt inquired about the possibility of going to the infusion clinic directly. RYLEE informed pt that since she has Medicare, [facility] is not able to services her at their clinics. RYLEE also informed pt that if the issue is with her PICC line, she would need to discuss the issue and be seen by her home RN first in order to proceed with resolving the issue. Pt verbalized understanding this and declined having any further questions at this time.    Maddison Dow, [facility]  Discharge Planner  [phone]

## 2024-06-07 ENCOUNTER — HOSPITAL ENCOUNTER (EMERGENCY)
Facility: HOSPITAL | Age: 74
Discharge: HOME | End: 2024-06-07
Attending: EMERGENCY MEDICINE
Payer: MEDICARE

## 2024-06-07 VITALS
TEMPERATURE: 98 F | HEIGHT: 65 IN | WEIGHT: 101 LBS | BODY MASS INDEX: 16.83 KG/M2 | DIASTOLIC BLOOD PRESSURE: 82 MMHG | HEART RATE: 63 BPM | OXYGEN SATURATION: 95 % | RESPIRATION RATE: 18 BRPM | SYSTOLIC BLOOD PRESSURE: 126 MMHG

## 2024-06-07 DIAGNOSIS — B49 FUNGAL INFECTION: Primary | ICD-10-CM

## 2024-06-07 PROCEDURE — 96365 THER/PROPH/DIAG IV INF INIT: CPT

## 2024-06-07 PROCEDURE — 99284 EMERGENCY DEPT VISIT MOD MDM: CPT

## 2024-06-07 PROCEDURE — 36415 COLL VENOUS BLD VENIPUNCTURE: CPT

## 2024-06-07 RX ORDER — SODIUM CHLORIDE 9 MG/ML
INJECTION, SOLUTION INTRAVENOUS CONTINUOUS
Status: DISCONTINUED | OUTPATIENT
Start: 2024-06-07 | End: 2024-06-07

## 2024-06-07 NOTE — DISCHARGE INSTRUCTIONS
If you are unable to get into the infusion center, return to the emergency department for another IV dose tomorrow.

## 2024-06-07 NOTE — ED INITIAL ASSESSMENT (HPI)
Pt sent here by Dr. Salas ([facility] infectious diseases) who is being seen for rupture appendicitis which caused abscesses in April 2024. Pt was sent home on IV antibiotics to an infusion center and then eventually was doing it herself at home. Pt was giving herself the antibiotics and May 20th she had an appendectomy where she had drains put in. Pt started noticing brown bile coming from the drains so then May 22nd she had to have a bowel resection where they found yeast in her culture and was put on anti-fungals. Pt was giving herself the anti-fungals at home where her home health nurse was having difficulty putting it through her PICC. Pt started experiencing issues with her PICC with flushing it. Pt is now short of her medicine and was not able to flush her PICC. Pt requested going to infusion center because she is supposed to be on the infusions until 6/19. Infusion center has not gotten back to her and she has not received her anti-fungals in 3 days. Pt needs her infusion and doctor Linn wants her admitted.

## 2024-06-07 NOTE — ED PROVIDER NOTES
Patient Seen in: [facility] Emergency Department      History     Chief Complaint   Patient presents with    Infection     Stated Complaint: Here for antifungal infusion    Subjective:     HPI    74-year-old woman who is usually quite healthy and had a recent appendectomy and small bowel perforation.  This required IV antibiotics for Pseudomonas, Bacteroides, and E. coli infection.  She was discharged on IV meropenem.  Her intra-abdominal drainage cultures grew Daisy Adrian I resistant to fluconazole.  It was advised that she get daily IV infusions of micafungin.  This was to be arranged through the [facility] infusion center and there were problems with getting this arranged.      Objective:   Past Medical History:    COVID    Mitral valve prolapse    Strep throat    Visual impairment    reading glasses              Past Surgical History:   Procedure Laterality Date    Arthroscopy of joint unlisted Right 2018          Colonoscopy,diagnostic      Dr. Seo    Colonoscopy,diagnostic  2018    hepatic flexure polyp    Egd N/A 2018    Procedure: ESOPHAGOGASTRODUODENOSCOPY, COLONOSCOPY, POSSIBLE BIOPSY, POSSIBLE POLYPECTOMY 11386, 66907;  Surgeon: Yan Quiñones MD;  Location: [facility]    Enlarge breast with implant Bilateral 2001    Implant left      Implant right      Other surgical history      abdominoplasty    Other surgical history      explant of breast implants    Other surgical history Left     neuroma removal of foot    Rev lower eyelid exten fat pad  10/14/2008    Performed by LISA FERRARA at [facility]    Rev lower eyelid exten fat pad  10/14/2008    Performed by LISA FERRARA at [facility]    Rev upper eyelid w excess skin  10/14/2008    Performed by LISA FERRARA at [facility]    Rev upper eyelid w excess skin  10/14/2008    Performed by LISA FERRARA at [facility]    Shoulder  arthroscopy Right 06/2019    manipulation    Upper gi endoscopy,diagnosis  02/28/2018    hiatal hernia, gastric and SB Bx taken                Social History     Socioeconomic History    Marital status:    Tobacco Use    Smoking status: Never    Smokeless tobacco: Never   Vaping Use    Vaping status: Never Used   Substance and Sexual Activity    Alcohol use: Yes     Comment: socially    Drug use: No    Sexual activity: Not Currently     Partners: Male     Social Determinants of Health     Financial Resource Strain: Low Risk  (6/21/2021)    Received from [facility]    Overall Financial Resource Strain (CARDIA)     Difficulty of Paying Living Expenses: Not hard at all   Food Insecurity: No Food Insecurity (5/21/2024)    Food Insecurity     Food Insecurity: Never true   Transportation Needs: No Transportation Needs (5/21/2024)    Transportation Needs     Lack of Transportation: No   Stress: No Stress Concern Present (6/21/2021)    Received from [facility]    [employer] [place] [employer] - Occupational Stress Questionnaire     Feeling of Stress : Not at all   Housing Stability: Low Risk  (5/21/2024)    Housing Stability     Housing Instability: No              Review of Systems    Positive for stated complaint: Here for antifungal infusion  Other systems are as noted in HPI.  Constitutional and vital signs reviewed.      All other systems reviewed and negative except as noted above.    Physical Exam     ED Triage Vitals [06/07/24 1535]   /74   Pulse 85   Resp 16   Temp 98.1 °F (36.7 °C)   Temp src Temporal   SpO2 99 %   O2 Device None (Room air)       Current:/82   Pulse 63   Temp 98.1 °F (36.7 °C) (Temporal)   Resp 18   Ht 165.1 cm (5' 5\")   Wt 45.8 kg   LMP  (LMP Unknown)   SpO2 95%   BMI 16.81 kg/m²       General:  Vitals as listed.  No acute distress   HEENT: Sclerae anicteric.  Conjunctivae show no pallor.  Oropharynx clear, mucous  membranes moist   Lungs: good air exchange  Neuro: Alert oriented and nonfocal        ED COURSE and MDM     I reviewed prior external notes including last week's note by her [facility] infectious disease physician    Was sent to the emergency department for admission only for a once daily dose of micafungin.  She strongly prefers to go home.  I did call the  who will try to arrange infusion clinic visit though highly unlikely since this is Friday evening.  She was advised to otherwise return to the emergency department tomorrow to receive another dose of micafungin, 100 mg IV, through her PICC line.    I have discussed with the patient the results of testing, differential diagnosis, and treatment plan. They expressed clear understanding of these instructions and agrees to the plan provided.    Disposition and Plan     Clinical Impression:  1. Fungal infection         Disposition:  Discharge  6/7/2024  6:53 pm    Follow-up:  Anjana Salas MD  [address]  [place] [address]  [phone]    Call in 1 day(s)          Medications Prescribed:  Current Discharge Medication List

## 2024-06-08 ENCOUNTER — HOSPITAL ENCOUNTER (EMERGENCY)
Facility: HOSPITAL | Age: 74
Discharge: HOME | End: 2024-06-08
Attending: EMERGENCY MEDICINE
Payer: MEDICARE

## 2024-06-08 VITALS
HEART RATE: 78 BPM | OXYGEN SATURATION: 98 % | DIASTOLIC BLOOD PRESSURE: 78 MMHG | TEMPERATURE: 98 F | RESPIRATION RATE: 18 BRPM | SYSTOLIC BLOOD PRESSURE: 130 MMHG

## 2024-06-08 DIAGNOSIS — Z76.89 ENCOUNTER FOR MEDICATION ADMINISTRATION: Primary | ICD-10-CM

## 2024-06-08 PROCEDURE — 99283 EMERGENCY DEPT VISIT LOW MDM: CPT

## 2024-06-08 PROCEDURE — 96365 THER/PROPH/DIAG IV INF INIT: CPT

## 2024-06-08 PROCEDURE — 99284 EMERGENCY DEPT VISIT MOD MDM: CPT

## 2024-06-08 NOTE — ED PROVIDER NOTES
Patient Seen in: [facility] Emergency Department      History     Chief Complaint   Patient presents with    Other     Stated Complaint: needs infusions of micafungin    Subjective:   HPI    Herer for micofungin infusion- went to [place] but they dont do that infusion there- sent in by ZAC teixeira in April and may- drains and abx and then subsequent infection of drain with yeast and biliary leak with resultant bowel resection      Objective:   Past Medical History:    COVID    Mitral valve prolapse    Strep throat    Visual impairment    reading glasses              Past Surgical History:   Procedure Laterality Date    Arthroscopy of joint unlisted Right 2018          Colonoscopy,diagnostic      Dr. Seo    Colonoscopy,diagnostic  2018    hepatic flexure polyp    Egd N/A 2018    Procedure: ESOPHAGOGASTRODUODENOSCOPY, COLONOSCOPY, POSSIBLE BIOPSY, POSSIBLE POLYPECTOMY 85864, 61349;  Surgeon: Yan Quiñones MD;  Location: [facility]    Enlarge breast with implant Bilateral 2001    Implant left      Implant right      Other surgical history      abdominoplasty    Other surgical history      explant of breast implants    Other surgical history Left     neuroma removal of foot    Rev lower eyelid exten fat pad  10/14/2008    Performed by LISA FERRARA at [facility]    Rev lower eyelid exten fat pad  10/14/2008    Performed by LISA FERRARA at [facility]    Rev upper eyelid w excess skin  10/14/2008    Performed by LISA FERRARA at [facility]    Rev upper eyelid w excess skin  10/14/2008    Performed by LISA FERRARA at [facility]    Shoulder arthroscopy Right 2019    manipulation    Upper gi endoscopy,diagnosis  2018    hiatal hernia, gastric and SB Bx taken                Social History     Socioeconomic History    Marital status:    Tobacco Use    Smoking  status: Never    Smokeless tobacco: Never   Vaping Use    Vaping status: Never Used   Substance and Sexual Activity    Alcohol use: Yes     Comment: socially    Drug use: No    Sexual activity: Not Currently     Partners: Male     Social Determinants of Health     Financial Resource Strain: Low Risk  (6/21/2021)    Received from [facility]    Overall Financial Resource Strain (CARDIA)     Difficulty of Paying Living Expenses: Not hard at all   Food Insecurity: No Food Insecurity (5/21/2024)    Food Insecurity     Food Insecurity: Never true   Transportation Needs: No Transportation Needs (5/21/2024)    Transportation Needs     Lack of Transportation: No   Stress: No Stress Concern Present (6/21/2021)    Received from [facility]    [employer] [place] [employer] - Occupational Stress Questionnaire     Feeling of Stress : Not at all   Housing Stability: Low Risk  (5/21/2024)    Housing Stability     Housing Instability: No              Review of Systems    Positive for stated complaint: needs infusions of micafungin  Other systems are as noted in HPI.  Constitutional and vital signs reviewed.      All other systems reviewed and negative except as noted above.    Physical Exam     ED Triage Vitals [06/08/24 1351]   /87   Pulse 70   Resp 14   Temp 97.5 °F (36.4 °C)   Temp src Temporal   SpO2 100 %   O2 Device None (Room air)       Current Vitals:   Vital Signs  BP: 127/79  Pulse: 68  Resp: 18  Temp: 97.5 °F (36.4 °C)  Temp src: Temporal    Oxygen Therapy  SpO2: 98 %  O2 Device: None (Room air)            Physical Exam    ***      ED Course   Labs Reviewed - No data to display          ***         MDM      ***                                   MDM    Disposition and Plan     Clinical Impression:  No diagnosis found.     Disposition:  Ed to ed  6/8/2024  2:08 pm    Follow-up:  No follow-up provider specified.        Medications Prescribed:  Current Discharge  Medication List

## 2024-06-08 NOTE — Clinical Note
Date:6/8/2024  Patient:Merissa Oshea  Attending Provider:Maria Isabel Begum DO    Merissa Oshea was transferred to [facility] emergency department because she needs a fungal infusion which is not available at our site..  Charge nurse wa s notified.  Condition at time of transfer was Good.

## 2024-06-08 NOTE — ED QUICK NOTES
Discussed case with Rody Foster RN at main ED - Rody verified with pharmacy that medication is available for patient

## 2024-06-09 ENCOUNTER — HOSPITAL ENCOUNTER (EMERGENCY)
Facility: HOSPITAL | Age: 74
Discharge: HOME | End: 2024-06-09
Attending: EMERGENCY MEDICINE
Payer: MEDICARE

## 2024-06-09 VITALS
HEART RATE: 62 BPM | SYSTOLIC BLOOD PRESSURE: 136 MMHG | TEMPERATURE: 98 F | HEIGHT: 64 IN | BODY MASS INDEX: 17.42 KG/M2 | WEIGHT: 102 LBS | RESPIRATION RATE: 18 BRPM | OXYGEN SATURATION: 100 % | DIASTOLIC BLOOD PRESSURE: 84 MMHG

## 2024-06-09 DIAGNOSIS — Z76.89 ENCOUNTER FOR MEDICATION ADMINISTRATION: Primary | ICD-10-CM

## 2024-06-09 PROCEDURE — 99284 EMERGENCY DEPT VISIT MOD MDM: CPT

## 2024-06-09 PROCEDURE — 99283 EMERGENCY DEPT VISIT LOW MDM: CPT

## 2024-06-09 PROCEDURE — 96365 THER/PROPH/DIAG IV INF INIT: CPT

## 2024-06-09 NOTE — ED PROVIDER NOTES
Patient Seen in: [facility] Emergency Department      History     Chief Complaint   Patient presents with    Other     Stated Complaint: returning for another infusion    Subjective:   HPI    Patient comes to the emergency department requiring infusion of antifungal medication which which is being administered on an repeated basis because of her fungal infection that occurred after her appendectomy.  Patient has no acute complaints.  Patient's physicians were unable to arrange for infusion center over the weekend and directed her to come here to have the infusions.    Objective:   Past Medical History:    COVID    Mitral valve prolapse    Strep throat    Visual impairment    reading glasses              Past Surgical History:   Procedure Laterality Date    Arthroscopy of joint unlisted Right 2018          Colonoscopy,diagnostic      Dr. Seo    Colonoscopy,diagnostic  2018    hepatic flexure polyp    Egd N/A 2018    Procedure: ESOPHAGOGASTRODUODENOSCOPY, COLONOSCOPY, POSSIBLE BIOPSY, POSSIBLE POLYPECTOMY 60535, 65231;  Surgeon: Yan Quiñones MD;  Location: [facility]    Enlarge breast with implant Bilateral 2001    Implant left      Implant right      Other surgical history      abdominoplasty    Other surgical history      explant of breast implants    Other surgical history Left     neuroma removal of foot    Rev lower eyelid exten fat pad  10/14/2008    Performed by LISA FERRARA at [facility]    Rev lower eyelid exten fat pad  10/14/2008    Performed by LISA FERRARA at [facility]    Rev upper eyelid w excess skin  10/14/2008    Performed by LISA FERRARA at [facility]    Rev upper eyelid w excess skin  10/14/2008    Performed by LISA FERRARA at [facility]    Shoulder arthroscopy Right 2019    manipulation    Upper gi endoscopy,diagnosis  2018    hiatal hernia, gastric and  SB Bx taken                Social History     Socioeconomic History    Marital status:    Tobacco Use    Smoking status: Never    Smokeless tobacco: Never   Vaping Use    Vaping status: Never Used   Substance and Sexual Activity    Alcohol use: Yes     Comment: socially    Drug use: No    Sexual activity: Not Currently     Partners: Male     Social Determinants of Health     Financial Resource Strain: Low Risk  (6/21/2021)    Received from [facility]    Overall Financial Resource Strain (CARDIA)     Difficulty of Paying Living Expenses: Not hard at all   Food Insecurity: No Food Insecurity (5/21/2024)    Food Insecurity     Food Insecurity: Never true   Transportation Needs: No Transportation Needs (5/21/2024)    Transportation Needs     Lack of Transportation: No   Stress: No Stress Concern Present (6/21/2021)    Received from [facility]    Citizen of [place] of Occupational Health - Occupational Stress Questionnaire     Feeling of Stress : Not at all   Housing Stability: Low Risk  (5/21/2024)    Housing Stability     Housing Instability: No              Review of Systems    Positive for stated complaint: returning for another infusion  Other systems are as noted in HPI.  Constitutional and vital signs reviewed.      All other systems reviewed and negative except as noted above.    Physical Exam     ED Triage Vitals [06/09/24 1516]   /84   Pulse 62   Resp 18   Temp 97.8 °F (36.6 °C)   Temp src Temporal   SpO2 100 %   O2 Device None (Room air)       Current Vitals:   Vital Signs  BP: 136/84  Pulse: 62  Resp: 18  Temp: 97.8 °F (36.6 °C)  Temp src: Temporal  MAP (mmHg): (!) 102    Oxygen Therapy  SpO2: 100 %  O2 Device: None (Room air)            Physical Exam  Vitals and nursing note reviewed.   Constitutional:       General: She is not in acute distress.     Appearance: Normal appearance. She is well-developed.   Pulmonary:      Effort: Pulmonary effort is  normal. No respiratory distress.      Breath sounds: Normal breath sounds. No wheezing.   Abdominal:      General: Bowel sounds are normal. There is no distension.      Palpations: Abdomen is soft.      Tenderness: There is no abdominal tenderness. There is no guarding.   Neurological:      Mental Status: She is alert and oriented to person, place, and time.              ED Course   Labs Reviewed - No data to display          Patient received her usual dose of micafungin in the emergency department.  Infusion occurred uneventfully.         MDM      Patient presents to the emergency department for infusion of micafungin.  Infusion proceeded without any complications.  Patient had no acute clinical findings or history suggestive of any acute abdominal inflammation or any allergic reaction to the medication.  She was instructed follow-up with her primary physician and her infusion center tomorrow for continuation of her infusion.                                   Medical Decision Making      Disposition and Plan     Clinical Impression:  1. Encounter for medication administration         Disposition:  Discharge  6/9/2024  5:25 pm    Follow-up:  Cruz Puckett MD  [address] [facility]  [address] [place] [id number] [phone]    Follow up            Medications Prescribed:  Current Discharge Medication List

## 2024-06-09 NOTE — DISCHARGE INSTRUCTIONS
Return for any pain, fever or any reaction to medications.  Follow-up with your primary physician and your infusion center tomorrow.

## 2024-06-10 ENCOUNTER — LAB REQUISITION (OUTPATIENT)
Dept: LAB | Age: 74
End: 2024-06-10
Payer: MEDICARE

## 2024-06-10 ENCOUNTER — OFFICE VISIT (OUTPATIENT)
Dept: HEMATOLOGY/ONCOLOGY | Facility: HOSPITAL | Age: 74
End: 2024-06-10
Attending: INTERNAL MEDICINE
Payer: MEDICARE

## 2024-06-10 ENCOUNTER — TELEPHONE (OUTPATIENT)
Dept: CASE MANAGEMENT | Facility: HOSPITAL | Age: 74
End: 2024-06-10

## 2024-06-10 VITALS
TEMPERATURE: 98 F | WEIGHT: 103 LBS | SYSTOLIC BLOOD PRESSURE: 152 MMHG | OXYGEN SATURATION: 100 % | DIASTOLIC BLOOD PRESSURE: 78 MMHG | BODY MASS INDEX: 17.58 KG/M2 | RESPIRATION RATE: 16 BRPM | HEIGHT: 64.02 IN | HEART RATE: 62 BPM

## 2024-06-10 DIAGNOSIS — K35.211 ACUTE APPENDICITIS WITH PERFORATION, GENERALIZED PERITONITIS, AND ABSCESS, UNSPECIFIED WHETHER GANGRENE PRESENT: Primary | ICD-10-CM

## 2024-06-10 DIAGNOSIS — K35.211 ACUTE APPENDICITIS WITH GENERALIZED PERITONITIS, WITH PERFORATION AND ABSCESS: ICD-10-CM

## 2024-06-10 LAB
ALBUMIN SERPL-MCNC: 4.1 G/DL (ref 3.2–4.8)
ALBUMIN/GLOB SERPL: 1.5 {RATIO} (ref 1–2)
ALP LIVER SERPL-CCNC: 70 U/L
ALT SERPL-CCNC: <7 U/L
ANION GAP SERPL CALC-SCNC: 8 MMOL/L (ref 0–18)
AST SERPL-CCNC: 30 U/L (ref ?–34)
BASOPHILS # BLD AUTO: 0.05 X10(3) UL (ref 0–0.2)
BASOPHILS NFR BLD AUTO: 1.6 %
BILIRUB SERPL-MCNC: 0.4 MG/DL (ref 0.2–1.1)
BUN BLD-MCNC: 17 MG/DL (ref 9–23)
BUN/CREAT SERPL: 25.4 (ref 10–20)
CALCIUM BLD-MCNC: 9 MG/DL (ref 8.7–10.4)
CHLORIDE SERPL-SCNC: 105 MMOL/L (ref 98–112)
CO2 SERPL-SCNC: 28 MMOL/L (ref 21–32)
CREAT BLD-MCNC: 0.67 MG/DL
DEPRECATED RDW RBC AUTO: 46.5 FL (ref 35.1–46.3)
EGFRCR SERPLBLD CKD-EPI 2021: 93 ML/MIN/1.73M2 (ref 60–?)
EOSINOPHIL # BLD AUTO: 0.04 X10(3) UL (ref 0–0.7)
EOSINOPHIL NFR BLD AUTO: 1.2 %
ERYTHROCYTE [DISTWIDTH] IN BLOOD BY AUTOMATED COUNT: 14.6 % (ref 11–15)
GLOBULIN PLAS-MCNC: 2.7 G/DL (ref 2–3.5)
GLUCOSE BLD-MCNC: 84 MG/DL (ref 70–99)
HCT VFR BLD AUTO: 33.5 %
HGB BLD-MCNC: 11.4 G/DL
IMM GRANULOCYTES # BLD AUTO: 0 X10(3) UL (ref 0–1)
IMM GRANULOCYTES NFR BLD: 0 %
LYMPHOCYTES # BLD AUTO: 1.29 X10(3) UL (ref 1–4)
LYMPHOCYTES NFR BLD AUTO: 40.1 %
MCH RBC QN AUTO: 29.9 PG (ref 26–34)
MCHC RBC AUTO-ENTMCNC: 34 G/DL (ref 31–37)
MCV RBC AUTO: 87.9 FL
MONOCYTES # BLD AUTO: 0.4 X10(3) UL (ref 0.1–1)
MONOCYTES NFR BLD AUTO: 12.4 %
NEUTROPHILS # BLD AUTO: 1.44 X10 (3) UL (ref 1.5–7.7)
NEUTROPHILS # BLD AUTO: 1.44 X10(3) UL (ref 1.5–7.7)
NEUTROPHILS NFR BLD AUTO: 44.7 %
OSMOLALITY SERPL CALC.SUM OF ELEC: 293 MOSM/KG (ref 275–295)
PLATELET # BLD AUTO: 280 10(3)UL (ref 150–450)
POTASSIUM SERPL-SCNC: 4.1 MMOL/L (ref 3.5–5.1)
PROT SERPL-MCNC: 6.8 G/DL (ref 5.7–8.2)
RBC # BLD AUTO: 3.81 X10(6)UL
SODIUM SERPL-SCNC: 141 MMOL/L (ref 136–145)
WBC # BLD AUTO: 3.2 X10(3) UL (ref 4–11)

## 2024-06-10 PROCEDURE — 85025 COMPLETE CBC W/AUTO DIFF WBC: CPT | Performed by: INTERNAL MEDICINE

## 2024-06-10 PROCEDURE — 96365 THER/PROPH/DIAG IV INF INIT: CPT

## 2024-06-10 PROCEDURE — 80053 COMPREHEN METABOLIC PANEL: CPT | Performed by: INTERNAL MEDICINE

## 2024-06-10 NOTE — PROGRESS NOTES
Pt here for Micafungin. Pt denies any issues or concerns.      Ordering MD: Blane  Order Exp: 06/19/25     Pt tolerated infusion without difficulty or complaint. Reviewed next apt date/time: yes      Education Record  Learner:  Patient  Disease / Diagnosis: appendicitis/small bowel perforation.   Barriers / Limitations:  None  Method:  Discussion  General Topics:  Plan of care reviewed  Outcome:  Shows understanding     Patient in treatment center for antifungal infusion- patient had received last three doses in the ER. Patient feeling well today with no specific complaints or questions.   Patients home health nurse changed PICC dressing and got labs ordered by MD prior to patients appointment at the treatment center today.. Patient will plan to get dressing change and labs done at the treatment center next Monday.     Patient aware of next appointment date/time and left treatment center in stable condition.

## 2024-06-11 ENCOUNTER — OFFICE VISIT (OUTPATIENT)
Dept: HEMATOLOGY/ONCOLOGY | Facility: HOSPITAL | Age: 74
End: 2024-06-11
Attending: INTERNAL MEDICINE
Payer: MEDICARE

## 2024-06-11 VITALS
BODY MASS INDEX: 17.45 KG/M2 | HEIGHT: 64.02 IN | DIASTOLIC BLOOD PRESSURE: 83 MMHG | WEIGHT: 102.19 LBS | TEMPERATURE: 98 F | SYSTOLIC BLOOD PRESSURE: 137 MMHG | OXYGEN SATURATION: 100 % | HEART RATE: 62 BPM

## 2024-06-11 DIAGNOSIS — K35.211 ACUTE APPENDICITIS WITH PERFORATION, GENERALIZED PERITONITIS, AND ABSCESS, UNSPECIFIED WHETHER GANGRENE PRESENT: Primary | ICD-10-CM

## 2024-06-11 PROCEDURE — 96365 THER/PROPH/DIAG IV INF INIT: CPT

## 2024-06-11 NOTE — PROGRESS NOTES
Education Record    Learner:  Patient    Disease / Diagnosis: appendicitis - micafungin infusion    Barriers / Limitations:  None   Comments:    Method:  Brief focused   Comments:    General Topics:  Plan of care reviewed   Comments:    Outcome:  Shows understanding   Comments:    Tolerated without issue. Will return tomorrow for same.

## 2024-06-12 ENCOUNTER — OFFICE VISIT (OUTPATIENT)
Dept: HEMATOLOGY/ONCOLOGY | Facility: HOSPITAL | Age: 74
End: 2024-06-12
Attending: INTERNAL MEDICINE
Payer: MEDICARE

## 2024-06-12 VITALS
TEMPERATURE: 98 F | RESPIRATION RATE: 16 BRPM | OXYGEN SATURATION: 100 % | SYSTOLIC BLOOD PRESSURE: 126 MMHG | HEIGHT: 64.02 IN | DIASTOLIC BLOOD PRESSURE: 74 MMHG | BODY MASS INDEX: 17.62 KG/M2 | HEART RATE: 63 BPM | WEIGHT: 103.19 LBS

## 2024-06-12 DIAGNOSIS — K35.211 ACUTE APPENDICITIS WITH PERFORATION, GENERALIZED PERITONITIS, AND ABSCESS, UNSPECIFIED WHETHER GANGRENE PRESENT: Primary | ICD-10-CM

## 2024-06-12 LAB
BASOPHILS # BLD AUTO: 0.04 X10(3) UL (ref 0–0.2)
BASOPHILS NFR BLD AUTO: 1 %
EOSINOPHIL # BLD AUTO: 0.04 X10(3) UL (ref 0–0.7)
EOSINOPHIL NFR BLD AUTO: 1 %
ERYTHROCYTE [DISTWIDTH] IN BLOOD BY AUTOMATED COUNT: 14.5 %
HCT VFR BLD AUTO: 32.8 %
HGB BLD-MCNC: 11 G/DL
IMM GRANULOCYTES # BLD AUTO: 0.01 X10(3) UL (ref 0–1)
IMM GRANULOCYTES NFR BLD: 0.2 %
LYMPHOCYTES # BLD AUTO: 1.37 X10(3) UL (ref 1–4)
LYMPHOCYTES NFR BLD AUTO: 33.2 %
MCH RBC QN AUTO: 29 PG (ref 26–34)
MCHC RBC AUTO-ENTMCNC: 33.5 G/DL (ref 31–37)
MCV RBC AUTO: 86.5 FL
MONOCYTES # BLD AUTO: 0.32 X10(3) UL (ref 0.1–1)
MONOCYTES NFR BLD AUTO: 7.7 %
NEUTROPHILS # BLD AUTO: 2.35 X10 (3) UL (ref 1.5–7.7)
NEUTROPHILS # BLD AUTO: 2.35 X10(3) UL (ref 1.5–7.7)
NEUTROPHILS NFR BLD AUTO: 56.9 %
PLATELET # BLD AUTO: 219 10(3)UL (ref 150–450)
RBC # BLD AUTO: 3.79 X10(6)UL
WBC # BLD AUTO: 4.1 X10(3) UL (ref 4–11)

## 2024-06-12 PROCEDURE — 85025 COMPLETE CBC W/AUTO DIFF WBC: CPT

## 2024-06-12 PROCEDURE — 96365 THER/PROPH/DIAG IV INF INIT: CPT

## 2024-06-12 NOTE — PROGRESS NOTES
Pt here for micafungin . Pt denies any issues or concerns.      Ordering MD: Maritza  Order Exp: 6/19/24     Pt tolerated infusion without difficulty or complaint. Reviewed next apt date/time: 6/13 at 3pm      Education Record  Learner:  Patient  Disease / Diagnosis: appendicitis with perforation  Barriers / Limitations:  None  Method:  Reinforcement  General Topics:  Medication and Plan of care reviewed  Outcome:  Shows understanding

## 2024-06-13 ENCOUNTER — OFFICE VISIT (OUTPATIENT)
Dept: HEMATOLOGY/ONCOLOGY | Facility: HOSPITAL | Age: 74
End: 2024-06-13
Attending: INTERNAL MEDICINE
Payer: MEDICARE

## 2024-06-13 VITALS
RESPIRATION RATE: 16 BRPM | SYSTOLIC BLOOD PRESSURE: 153 MMHG | OXYGEN SATURATION: 100 % | WEIGHT: 101.19 LBS | TEMPERATURE: 98 F | BODY MASS INDEX: 17.28 KG/M2 | HEIGHT: 64.02 IN | HEART RATE: 60 BPM | DIASTOLIC BLOOD PRESSURE: 74 MMHG

## 2024-06-13 DIAGNOSIS — K35.211 ACUTE APPENDICITIS WITH PERFORATION, GENERALIZED PERITONITIS, AND ABSCESS, UNSPECIFIED WHETHER GANGRENE PRESENT: Primary | ICD-10-CM

## 2024-06-13 PROCEDURE — 96365 THER/PROPH/DIAG IV INF INIT: CPT

## 2024-06-13 NOTE — PROGRESS NOTES
Education Record    Learner:  Patient    Disease / Diagnosis: appendicitis with perforation    Barriers / Limitations:  None   Comments:    Method:  Discussion   Comments:    General Topics:  Medication, Side effects and symptom management, Plan of care reviewed, and Fall risk and prevention   Comments:    Outcome:  Shows understanding   Comments:    Micafungin infusion tolerated well. Pt stating she saw infectious disease today and her last day will be June 19th. Informed her that the ID office will have to send us an order stating ok to remove PICC - she verbalized understanding. Discharged ambulatory in stable condition.

## 2024-06-14 ENCOUNTER — OFFICE VISIT (OUTPATIENT)
Dept: HEMATOLOGY/ONCOLOGY | Facility: HOSPITAL | Age: 74
End: 2024-06-14
Attending: INTERNAL MEDICINE
Payer: MEDICARE

## 2024-06-14 VITALS
WEIGHT: 101.63 LBS | DIASTOLIC BLOOD PRESSURE: 78 MMHG | TEMPERATURE: 98 F | OXYGEN SATURATION: 100 % | HEIGHT: 64.02 IN | RESPIRATION RATE: 16 BRPM | SYSTOLIC BLOOD PRESSURE: 114 MMHG | BODY MASS INDEX: 17.35 KG/M2 | HEART RATE: 62 BPM

## 2024-06-14 DIAGNOSIS — K35.211 ACUTE APPENDICITIS WITH PERFORATION, GENERALIZED PERITONITIS, AND ABSCESS, UNSPECIFIED WHETHER GANGRENE PRESENT: Primary | ICD-10-CM

## 2024-06-14 PROCEDURE — 96365 THER/PROPH/DIAG IV INF INIT: CPT

## 2024-06-14 NOTE — PROGRESS NOTES
Education Record    Learner:  Patient    Disease / Diagnosis: acute appendicitis    Barriers / Limitations:  None   Comments:    Method:  Brief focused   Comments:    General Topics:  Plan of care reviewed   Comments:    Outcome:  Shows understanding   Comments:    Micafungin infusion over 1 hour, 100mg. Tolerated without issue. Saw ID yesterday, will in fact be done with her infusion as of 6/19 and will have PICC line removed then.

## 2024-06-15 ENCOUNTER — OFFICE VISIT (OUTPATIENT)
Dept: HEMATOLOGY/ONCOLOGY | Facility: HOSPITAL | Age: 74
End: 2024-06-15
Attending: INTERNAL MEDICINE
Payer: MEDICARE

## 2024-06-15 VITALS
TEMPERATURE: 97 F | BODY MASS INDEX: 17 KG/M2 | HEART RATE: 59 BPM | HEIGHT: 64.02 IN | OXYGEN SATURATION: 100 % | DIASTOLIC BLOOD PRESSURE: 81 MMHG | SYSTOLIC BLOOD PRESSURE: 151 MMHG

## 2024-06-15 DIAGNOSIS — K35.211 ACUTE APPENDICITIS WITH PERFORATION, GENERALIZED PERITONITIS, AND ABSCESS, UNSPECIFIED WHETHER GANGRENE PRESENT: Primary | ICD-10-CM

## 2024-06-15 PROCEDURE — 96365 THER/PROPH/DIAG IV INF INIT: CPT

## 2024-06-15 NOTE — PROGRESS NOTES
Education Record    Learner:  Patient    Disease / Diagnosis: Here for IV Micafungin    Barriers / Limitations:  None    Method:  Brief focused, printed material and  reinforcement    General Topics:  Plan of care reviewed    Outcome:  Shows understanding. Pt tolerated treatment. Left in stable condition. Appts in place.

## 2024-06-16 ENCOUNTER — OFFICE VISIT (OUTPATIENT)
Dept: HEMATOLOGY/ONCOLOGY | Facility: HOSPITAL | Age: 74
End: 2024-06-16
Attending: INTERNAL MEDICINE
Payer: MEDICARE

## 2024-06-16 VITALS
SYSTOLIC BLOOD PRESSURE: 121 MMHG | RESPIRATION RATE: 18 BRPM | OXYGEN SATURATION: 98 % | TEMPERATURE: 98 F | HEART RATE: 64 BPM | DIASTOLIC BLOOD PRESSURE: 75 MMHG

## 2024-06-16 DIAGNOSIS — K35.211 ACUTE APPENDICITIS WITH PERFORATION, GENERALIZED PERITONITIS, AND ABSCESS, UNSPECIFIED WHETHER GANGRENE PRESENT: Primary | ICD-10-CM

## 2024-06-16 PROCEDURE — 96365 THER/PROPH/DIAG IV INF INIT: CPT

## 2024-06-16 NOTE — PROGRESS NOTES
Education Record    Learner:  Patient    Disease / Diagnosis: pt here for invanz    Barriers / Limitations:  None    Method:  Brief focused, printed material and  reinforcement    General Topics:  Plan of care reviewed    Outcome: pt tolerated infusion with no c/o.

## 2024-06-17 ENCOUNTER — OFFICE VISIT (OUTPATIENT)
Dept: HEMATOLOGY/ONCOLOGY | Facility: HOSPITAL | Age: 74
End: 2024-06-17
Attending: INTERNAL MEDICINE
Payer: MEDICARE

## 2024-06-17 VITALS
SYSTOLIC BLOOD PRESSURE: 124 MMHG | HEART RATE: 103 BPM | RESPIRATION RATE: 18 BRPM | DIASTOLIC BLOOD PRESSURE: 79 MMHG | TEMPERATURE: 99 F | OXYGEN SATURATION: 96 %

## 2024-06-17 DIAGNOSIS — K35.211 ACUTE APPENDICITIS WITH PERFORATION, GENERALIZED PERITONITIS, AND ABSCESS, UNSPECIFIED WHETHER GANGRENE PRESENT: Primary | ICD-10-CM

## 2024-06-17 LAB
ALBUMIN SERPL-MCNC: 3.4 G/DL (ref 3.4–5)
ALBUMIN/GLOB SERPL: 1 {RATIO} (ref 1–2)
ALP LIVER SERPL-CCNC: 72 U/L
ALT SERPL-CCNC: 12 U/L
ANION GAP SERPL CALC-SCNC: 5 MMOL/L (ref 0–18)
AST SERPL-CCNC: 18 U/L (ref 15–37)
BASOPHILS # BLD AUTO: 0.03 X10(3) UL (ref 0–0.2)
BASOPHILS NFR BLD AUTO: 0.7 %
BILIRUB SERPL-MCNC: 0.5 MG/DL (ref 0.1–2)
BUN BLD-MCNC: 17 MG/DL (ref 9–23)
CALCIUM BLD-MCNC: 9.1 MG/DL (ref 8.5–10.1)
CHLORIDE SERPL-SCNC: 105 MMOL/L (ref 98–112)
CO2 SERPL-SCNC: 26 MMOL/L (ref 21–32)
CREAT BLD-MCNC: 0.76 MG/DL
EGFRCR SERPLBLD CKD-EPI 2021: 83 ML/MIN/1.73M2 (ref 60–?)
EOSINOPHIL # BLD AUTO: 0.06 X10(3) UL (ref 0–0.7)
EOSINOPHIL NFR BLD AUTO: 1.4 %
ERYTHROCYTE [DISTWIDTH] IN BLOOD BY AUTOMATED COUNT: 14.4 %
FASTING STATUS PATIENT QL REPORTED: NO
GLOBULIN PLAS-MCNC: 3.5 G/DL (ref 2.8–4.4)
GLUCOSE BLD-MCNC: 92 MG/DL (ref 70–99)
HCT VFR BLD AUTO: 35.2 %
HGB BLD-MCNC: 11.6 G/DL
IMM GRANULOCYTES # BLD AUTO: 0.01 X10(3) UL (ref 0–1)
IMM GRANULOCYTES NFR BLD: 0.2 %
LYMPHOCYTES # BLD AUTO: 1.56 X10(3) UL (ref 1–4)
LYMPHOCYTES NFR BLD AUTO: 37.2 %
MCH RBC QN AUTO: 29.2 PG (ref 26–34)
MCHC RBC AUTO-ENTMCNC: 33 G/DL (ref 31–37)
MCV RBC AUTO: 88.7 FL
MONOCYTES # BLD AUTO: 0.42 X10(3) UL (ref 0.1–1)
MONOCYTES NFR BLD AUTO: 10 %
NEUTROPHILS # BLD AUTO: 2.11 X10 (3) UL (ref 1.5–7.7)
NEUTROPHILS # BLD AUTO: 2.11 X10(3) UL (ref 1.5–7.7)
NEUTROPHILS NFR BLD AUTO: 50.5 %
OSMOLALITY SERPL CALC.SUM OF ELEC: 283 MOSM/KG (ref 275–295)
PLATELET # BLD AUTO: 205 10(3)UL (ref 150–450)
POTASSIUM SERPL-SCNC: 3.7 MMOL/L (ref 3.5–5.1)
PROT SERPL-MCNC: 6.9 G/DL (ref 6.4–8.2)
RBC # BLD AUTO: 3.97 X10(6)UL
SODIUM SERPL-SCNC: 136 MMOL/L (ref 136–145)
WBC # BLD AUTO: 4.2 X10(3) UL (ref 4–11)

## 2024-06-17 PROCEDURE — 85025 COMPLETE CBC W/AUTO DIFF WBC: CPT | Performed by: INTERNAL MEDICINE

## 2024-06-17 PROCEDURE — 80053 COMPREHEN METABOLIC PANEL: CPT | Performed by: INTERNAL MEDICINE

## 2024-06-17 PROCEDURE — 96365 THER/PROPH/DIAG IV INF INIT: CPT

## 2024-06-17 NOTE — PROGRESS NOTES
Pt here for Micafungin . Pt denies any issues or concerns.      Ordering MD: dr bennett  Order Exp: 6-19-24     Pt tolerated infusion without difficulty or complaint. Reviewed next apt date/time: yes      Education Record  Learner:  Patient  Disease / Diagnosis: Acute Appendicitis  Barriers / Limitations:  None  Method:  Brief focused  General Topics:  Plan of care reviewed  Outcome:  Shows understanding

## 2024-06-18 ENCOUNTER — OFFICE VISIT (OUTPATIENT)
Dept: HEMATOLOGY/ONCOLOGY | Facility: HOSPITAL | Age: 74
End: 2024-06-18
Attending: INTERNAL MEDICINE
Payer: MEDICARE

## 2024-06-18 VITALS
HEART RATE: 55 BPM | OXYGEN SATURATION: 100 % | RESPIRATION RATE: 16 BRPM | DIASTOLIC BLOOD PRESSURE: 74 MMHG | TEMPERATURE: 97 F | SYSTOLIC BLOOD PRESSURE: 147 MMHG

## 2024-06-18 DIAGNOSIS — K35.211 ACUTE APPENDICITIS WITH PERFORATION, GENERALIZED PERITONITIS, AND ABSCESS, UNSPECIFIED WHETHER GANGRENE PRESENT: Primary | ICD-10-CM

## 2024-06-18 PROCEDURE — 96365 THER/PROPH/DIAG IV INF INIT: CPT

## 2024-06-18 NOTE — PROGRESS NOTES
Pt here for Micafungin . Pt denies any issues or concerns.      Ordering MD: Blane  Order Exp: tomorrow, 6/19     Pt tolerated infusion without difficulty or complaint. Reviewed next apt date/time: yes      Education Record  Learner:  Patient  Disease / Diagnosis: Acute appendicitis/perforated bwoel  Barriers / Limitations:  None  Method:  Discussion  General Topics:  Plan of care reviewed  Outcome:  Shows understanding

## 2024-06-19 ENCOUNTER — OFFICE VISIT (OUTPATIENT)
Dept: HEMATOLOGY/ONCOLOGY | Facility: HOSPITAL | Age: 74
End: 2024-06-19
Attending: INTERNAL MEDICINE
Payer: MEDICARE

## 2024-06-19 VITALS
OXYGEN SATURATION: 100 % | SYSTOLIC BLOOD PRESSURE: 128 MMHG | HEART RATE: 62 BPM | DIASTOLIC BLOOD PRESSURE: 65 MMHG | TEMPERATURE: 98 F | RESPIRATION RATE: 16 BRPM

## 2024-06-19 DIAGNOSIS — K35.211 ACUTE APPENDICITIS WITH PERFORATION, GENERALIZED PERITONITIS, AND ABSCESS, UNSPECIFIED WHETHER GANGRENE PRESENT: Primary | ICD-10-CM

## 2024-06-19 PROCEDURE — 96365 THER/PROPH/DIAG IV INF INIT: CPT

## 2024-06-19 NOTE — PROGRESS NOTES
Education Record    Learner:  Patient    Disease / Diagnosis: Here for IV antibiotics.     Barriers / Limitations:  None    Method:  Brief focused, printed material and  reinforcement    General Topics:  Plan of care reviewed    Outcome:  Shows understanding. Pt tolerated treatment. Left in stable condition. PICC line removed. Last treatment today!

## 2024-12-21 ENCOUNTER — HOSPITAL ENCOUNTER (OUTPATIENT)
Age: 74
Discharge: HOME | End: 2024-12-21
Payer: MEDICARE

## 2024-12-21 VITALS
HEIGHT: 64.5 IN | TEMPERATURE: 99 F | RESPIRATION RATE: 18 BRPM | BODY MASS INDEX: 17.54 KG/M2 | DIASTOLIC BLOOD PRESSURE: 66 MMHG | OXYGEN SATURATION: 100 % | HEART RATE: 62 BPM | SYSTOLIC BLOOD PRESSURE: 117 MMHG | WEIGHT: 104 LBS

## 2024-12-21 DIAGNOSIS — J10.1 INFLUENZA A: Primary | ICD-10-CM

## 2024-12-21 LAB
POCT INFLUENZA A: POSITIVE
POCT INFLUENZA B: NEGATIVE

## 2024-12-21 PROCEDURE — 87502 INFLUENZA DNA AMP PROBE: CPT | Performed by: PHYSICIAN ASSISTANT

## 2024-12-21 PROCEDURE — 99213 OFFICE O/P EST LOW 20 MIN: CPT

## 2024-12-21 PROCEDURE — 99212 OFFICE O/P EST SF 10 MIN: CPT

## 2024-12-21 NOTE — ED PROVIDER NOTES
Patient Seen in: [facility]      History     Chief Complaint   Patient presents with    Cold     Stated Complaint: Cold Symptoms    Subjective:   HPI    73 YO female with below stated past medical history presents to immediate care for evaluation of bodyaches, fatigue and infrequent nonproductive cough starting 2 days ago.  Home COVID test negative.  She denies SOB, chest pain or any other complaints at this time.      Objective:     Past Medical History:    COVID    Mitral valve prolapse    Strep throat    Visual impairment    reading glasses              Past Surgical History:   Procedure Laterality Date    Arthroscopy of joint unlisted Right 2018          Colonoscopy,diagnostic      Dr. Seo    Colonoscopy,diagnostic  2018    hepatic flexure polyp    Egd N/A 2018    Procedure: ESOPHAGOGASTRODUODENOSCOPY, COLONOSCOPY, POSSIBLE BIOPSY, POSSIBLE POLYPECTOMY 75953, 12606;  Surgeon: Yan Quiñones MD;  Location: [facility]    Enlarge breast with implant Bilateral 2001    Implant left      Implant right      Other surgical history      abdominoplasty    Other surgical history      explant of breast implants    Other surgical history Left     neuroma removal of foot    Rev lower eyelid exten fat pad  10/14/2008    Performed by LISA FERRARA at [facility]    Rev lower eyelid exten fat pad  10/14/2008    Performed by LISA FERRARA at [facility]    Rev upper eyelid w excess skin  10/14/2008    Performed by LISA FERRARA at [facility]    Rev upper eyelid w excess skin  10/14/2008    Performed by LISA FERRARA at [facility]    Shoulder arthroscopy Right 2019    manipulation    Upper gi endoscopy,diagnosis  2018    hiatal hernia, gastric and SB Bx taken                Social History     Socioeconomic History    Marital status:    Tobacco Use    Smoking status: Never     Smokeless tobacco: Never   Vaping Use    Vaping status: Never Used   Substance and Sexual Activity    Alcohol use: Yes     Comment: socially    Drug use: No    Sexual activity: Not Currently     Partners: Male     Social Drivers of Health     Financial Resource Strain: Low Risk  (6/21/2021)    Received from [facility]    Overall Financial Resource Strain (CARDIA)     Difficulty of Paying Living Expenses: Not hard at all   Food Insecurity: No Food Insecurity (5/21/2024)    Food Insecurity     Food Insecurity: Never true   Transportation Needs: No Transportation Needs (5/21/2024)    Transportation Needs     Lack of Transportation: No   Stress: No Stress Concern Present (6/21/2021)    Received from [facility]    [place] of Occupational Health - Occupational Stress Questionnaire     Feeling of Stress : Not at all   Housing Stability: Low Risk  (5/21/2024)    Housing Stability     Housing Instability: No              Review of Systems    Positive for stated complaint: Cold Symptoms  Other systems are as noted in HPI.  Constitutional and vital signs reviewed.      All other systems reviewed and negative except as noted above.    Physical Exam     ED Triage Vitals [12/21/24 1330]   /66   Pulse 62   Resp 18   Temp 98.6 °F (37 °C)   Temp src Oral   SpO2 100 %   O2 Device None (Room air)       Current Vitals:   Vital Signs  BP: 117/66  Pulse: 62  Resp: 18  Temp: 98.6 °F (37 °C)  Temp src: Oral    Oxygen Therapy  SpO2: 100 %  O2 Device: None (Room air)        Physical Exam  Vitals and nursing note reviewed.   Constitutional:       General: She is not in acute distress.     Appearance: Normal appearance. She is not ill-appearing, toxic-appearing or diaphoretic.   Cardiovascular:      Rate and Rhythm: Normal rate.   Pulmonary:      Effort: Pulmonary effort is normal. No respiratory distress.      Breath sounds: Normal breath sounds. No wheezing.   Neurological:       Mental Status: She is alert and oriented to person, place, and time.   Psychiatric:         Mood and Affect: Mood normal.         Behavior: Behavior normal.         ED Course     Labs Reviewed   POCT FLU TEST - Abnormal; Notable for the following components:       Result Value    POCT INFLUENZA A Positive (*)     All other components within normal limits    Narrative:     This assay is a rapid molecular in vitro test utilizing nucleic acid amplification of influenza A and B viral RNA.        MDM      Differential diagnosis considered but not limited to influenza, COVID, pneumonia    Patient is afebrile and nontoxic in appearance.  Physical exam is reassuring.  Influenza A resulted positive.  Supportive care and return instructions discussed with understanding.        Medical Decision Making  Amount and/or Complexity of Data Reviewed  Labs: ordered. Decision-making details documented in ED Course.    Risk  OTC drugs.        Disposition and Plan     Clinical Impression:  1. Influenza A         Disposition:  Discharge  12/21/2024  2:50 pm    Follow-up:  [facility]  [address]  [facility] [address]  [phone]    If symptoms worsen          Medications Prescribed:  Discharge Medication List as of 12/21/2024  2:52 PM              Supplementary Documentation:

## 2025-05-29 ENCOUNTER — APPOINTMENT (OUTPATIENT)
Dept: CT IMAGING | Facility: HOSPITAL | Age: 75
End: 2025-05-29
Attending: EMERGENCY MEDICINE
Payer: MEDICARE

## 2025-05-29 ENCOUNTER — APPOINTMENT (OUTPATIENT)
Dept: GENERAL RADIOLOGY | Facility: HOSPITAL | Age: 75
End: 2025-05-29
Attending: EMERGENCY MEDICINE
Payer: MEDICARE

## 2025-05-29 ENCOUNTER — HOSPITAL ENCOUNTER (INPATIENT)
Facility: HOSPITAL | Age: 75
LOS: 1 days | Discharge: HOME | End: 2025-05-30
Attending: EMERGENCY MEDICINE | Admitting: INTERNAL MEDICINE
Payer: MEDICARE

## 2025-05-29 DIAGNOSIS — R11.2 NAUSEA AND VOMITING IN ADULT: Primary | ICD-10-CM

## 2025-05-29 DIAGNOSIS — K56.609 SBO (SMALL BOWEL OBSTRUCTION) (HCC): ICD-10-CM

## 2025-05-29 DIAGNOSIS — R10.30 LOWER ABDOMINAL PAIN: ICD-10-CM

## 2025-05-29 LAB
ALBUMIN SERPL-MCNC: 4.7 G/DL (ref 3.2–4.8)
ALBUMIN/GLOB SERPL: 1.9 {RATIO} (ref 1–2)
ALP LIVER SERPL-CCNC: 68 U/L (ref 55–142)
ALT SERPL-CCNC: 15 U/L (ref 10–49)
ANION GAP SERPL CALC-SCNC: 9 MMOL/L (ref 0–18)
AST SERPL-CCNC: 26 U/L (ref ?–34)
BASOPHILS # BLD AUTO: 0.02 X10(3) UL (ref 0–0.2)
BASOPHILS NFR BLD AUTO: 0.2 %
BILIRUB SERPL-MCNC: 0.6 MG/DL (ref 0.2–1.1)
BUN BLD-MCNC: 31 MG/DL (ref 9–23)
CALCIUM BLD-MCNC: 9.9 MG/DL (ref 8.7–10.6)
CHLORIDE SERPL-SCNC: 100 MMOL/L (ref 98–112)
CO2 SERPL-SCNC: 30 MMOL/L (ref 21–32)
CREAT BLD-MCNC: 0.95 MG/DL (ref 0.55–1.02)
EGFRCR SERPLBLD CKD-EPI 2021: 63 ML/MIN/1.73M2 (ref 60–?)
EOSINOPHIL # BLD AUTO: 0.02 X10(3) UL (ref 0–0.7)
EOSINOPHIL NFR BLD AUTO: 0.2 %
ERYTHROCYTE [DISTWIDTH] IN BLOOD BY AUTOMATED COUNT: 13.9 %
GLOBULIN PLAS-MCNC: 2.5 G/DL (ref 2–3.5)
GLUCOSE BLD-MCNC: 140 MG/DL (ref 70–99)
HCT VFR BLD AUTO: 40.9 % (ref 35–48)
HGB BLD-MCNC: 13.9 G/DL (ref 12–16)
IMM GRANULOCYTES # BLD AUTO: 0.04 X10(3) UL (ref 0–1)
IMM GRANULOCYTES NFR BLD: 0.4 %
LIPASE SERPL-CCNC: 33 U/L (ref 12–53)
LYMPHOCYTES # BLD AUTO: 1.03 X10(3) UL (ref 1–4)
LYMPHOCYTES NFR BLD AUTO: 9.7 %
MCH RBC QN AUTO: 29 PG (ref 26–34)
MCHC RBC AUTO-ENTMCNC: 34 G/DL (ref 31–37)
MCV RBC AUTO: 85.4 FL (ref 80–100)
MONOCYTES # BLD AUTO: 0.47 X10(3) UL (ref 0.1–1)
MONOCYTES NFR BLD AUTO: 4.4 %
NEUTROPHILS # BLD AUTO: 9 X10 (3) UL (ref 1.5–7.7)
NEUTROPHILS # BLD AUTO: 9 X10(3) UL (ref 1.5–7.7)
NEUTROPHILS NFR BLD AUTO: 85.1 %
OSMOLALITY SERPL CALC.SUM OF ELEC: 297 MOSM/KG (ref 275–295)
PLATELET # BLD AUTO: 242 10(3)UL (ref 150–450)
POTASSIUM SERPL-SCNC: 4 MMOL/L (ref 3.5–5.1)
PROT SERPL-MCNC: 7.2 G/DL (ref 5.7–8.2)
RBC # BLD AUTO: 4.79 X10(6)UL (ref 3.8–5.3)
SODIUM SERPL-SCNC: 139 MMOL/L (ref 136–145)
WBC # BLD AUTO: 10.6 X10(3) UL (ref 4–11)

## 2025-05-29 PROCEDURE — 80053 COMPREHEN METABOLIC PANEL: CPT

## 2025-05-29 PROCEDURE — 96374 THER/PROPH/DIAG INJ IV PUSH: CPT

## 2025-05-29 PROCEDURE — 85025 COMPLETE CBC W/AUTO DIFF WBC: CPT

## 2025-05-29 PROCEDURE — 96375 TX/PRO/DX INJ NEW DRUG ADDON: CPT

## 2025-05-29 PROCEDURE — 99285 EMERGENCY DEPT VISIT HI MDM: CPT

## 2025-05-29 PROCEDURE — 80053 COMPREHEN METABOLIC PANEL: CPT | Performed by: EMERGENCY MEDICINE

## 2025-05-29 PROCEDURE — 71045 X-RAY EXAM CHEST 1 VIEW: CPT | Performed by: EMERGENCY MEDICINE

## 2025-05-29 PROCEDURE — 83690 ASSAY OF LIPASE: CPT | Performed by: EMERGENCY MEDICINE

## 2025-05-29 PROCEDURE — 74177 CT ABD & PELVIS W/CONTRAST: CPT | Performed by: EMERGENCY MEDICINE

## 2025-05-29 PROCEDURE — 85025 COMPLETE CBC W/AUTO DIFF WBC: CPT | Performed by: EMERGENCY MEDICINE

## 2025-05-29 RX ORDER — MORPHINE SULFATE 2 MG/ML
2 INJECTION, SOLUTION INTRAMUSCULAR; INTRAVENOUS EVERY 2 HOUR PRN
Status: CANCELLED | OUTPATIENT
Start: 2025-05-29

## 2025-05-29 RX ORDER — ONDANSETRON 2 MG/ML
4 INJECTION INTRAMUSCULAR; INTRAVENOUS EVERY 6 HOURS PRN
Status: DISCONTINUED | OUTPATIENT
Start: 2025-05-29 | End: 2025-05-30

## 2025-05-29 RX ORDER — MORPHINE SULFATE 4 MG/ML
0.5 INJECTION, SOLUTION INTRAMUSCULAR; INTRAVENOUS EVERY 2 HOUR PRN
Status: DISCONTINUED | OUTPATIENT
Start: 2025-05-29 | End: 2025-05-30

## 2025-05-29 RX ORDER — MORPHINE SULFATE 4 MG/ML
2 INJECTION, SOLUTION INTRAMUSCULAR; INTRAVENOUS EVERY 2 HOUR PRN
Status: DISCONTINUED | OUTPATIENT
Start: 2025-05-29 | End: 2025-05-30

## 2025-05-29 RX ORDER — ONDANSETRON 2 MG/ML
4 INJECTION INTRAMUSCULAR; INTRAVENOUS ONCE
Status: COMPLETED | OUTPATIENT
Start: 2025-05-29 | End: 2025-05-29

## 2025-05-29 RX ORDER — LIDOCAINE HYDROCHLORIDE 20 MG/ML
10 SOLUTION OROPHARYNGEAL ONCE
Status: COMPLETED | OUTPATIENT
Start: 2025-05-29 | End: 2025-05-29

## 2025-05-29 RX ORDER — ENOXAPARIN SODIUM 100 MG/ML
40 INJECTION SUBCUTANEOUS DAILY
Status: DISCONTINUED | OUTPATIENT
Start: 2025-05-29 | End: 2025-05-30

## 2025-05-29 RX ORDER — MORPHINE SULFATE 4 MG/ML
1 INJECTION, SOLUTION INTRAMUSCULAR; INTRAVENOUS EVERY 2 HOUR PRN
Status: DISCONTINUED | OUTPATIENT
Start: 2025-05-29 | End: 2025-05-30

## 2025-05-29 RX ORDER — MORPHINE SULFATE 2 MG/ML
2 INJECTION, SOLUTION INTRAMUSCULAR; INTRAVENOUS ONCE
Status: COMPLETED | OUTPATIENT
Start: 2025-05-29 | End: 2025-05-29

## 2025-05-29 RX ORDER — METOCLOPRAMIDE HYDROCHLORIDE 5 MG/ML
5 INJECTION INTRAMUSCULAR; INTRAVENOUS EVERY 8 HOURS PRN
Status: DISCONTINUED | OUTPATIENT
Start: 2025-05-29 | End: 2025-05-30

## 2025-05-29 RX ORDER — SODIUM CHLORIDE 9 MG/ML
INJECTION, SOLUTION INTRAVENOUS CONTINUOUS
Status: DISCONTINUED | OUTPATIENT
Start: 2025-05-29 | End: 2025-05-30

## 2025-05-29 NOTE — ED QUICK NOTES
Pt rounded, awake and alert. Daughter at bedside. NGT in place. Pt states discomfort from it. Awaiting for transport.  Pt aware of her admission, verbalizing understanding

## 2025-05-29 NOTE — ED INITIAL ASSESSMENT (HPI)
Pt to ED with c/o upper abdominal pain since 10 pm last night. +Vomiting, denies diarrhea.  Pt with hx of bowel resection last year.  Denies fevers.

## 2025-05-29 NOTE — ED PROVIDER NOTES
Patient Seen in: [facility] Emergency Department        History  Chief Complaint   Patient presents with    Abdomen/Flank Pain    Nausea/Vomiting/Diarrhea     Stated Complaint: UPPER ABD PAIN WITH VOMITING    Subjective:   HPI            75-year-old female past medical history of mitral valve prolapse, history of perforated appendicitis with abscess formation presents ED with complaints of abdominal discomfort and vomiting as well.  Symptoms began over the last 24 hours no fever complaining of constipation unable to pass gas.      Objective:     Past Medical History:    COVID    Mitral valve prolapse    Strep throat    Visual impairment    reading glasses              Past Surgical History:   Procedure Laterality Date    Arthroscopy of joint unlisted Right 2018          Colonoscopy,diagnostic      Dr. Seo    Colonoscopy,diagnostic  2018    hepatic flexure polyp    Egd N/A 2018    Procedure: ESOPHAGOGASTRODUODENOSCOPY, COLONOSCOPY, POSSIBLE BIOPSY, POSSIBLE POLYPECTOMY 95269, 38761;  Surgeon: Yan Quiñones MD;  Location: [facility]    Enlarge breast with implant Bilateral 2001    Implant left      Implant right      Other surgical history      abdominoplasty    Other surgical history      explant of breast implants    Other surgical history Left     neuroma removal of foot    Rev lower eyelid exten fat pad  10/14/2008    Performed by LISA FERRARA at [facility]    Rev lower eyelid exten fat pad  10/14/2008    Performed by LISA FERRARA at [facility]    Rev upper eyelid w excess skin  10/14/2008    Performed by LISA FERRARA at [facility]    Rev upper eyelid w excess skin  10/14/2008    Performed by LISA FERRARA at [facility]    Shoulder arthroscopy Right 2019    manipulation    Upper gi endoscopy,diagnosis  2018    hiatal hernia, gastric and SB Bx taken                Social  History     Socioeconomic History    Marital status:    Tobacco Use    Smoking status: Never    Smokeless tobacco: Never   Vaping Use    Vaping status: Never Used   Substance and Sexual Activity    Alcohol use: Yes     Comment: socially    Drug use: No    Sexual activity: Not Currently     Partners: Male     Social Drivers of Health     Food Insecurity: No Food Insecurity (5/21/2024)    Food Insecurity     Food Insecurity: Never true   Transportation Needs: No Transportation Needs (5/21/2024)    Transportation Needs     Lack of Transportation: No   Housing Stability: Low Risk  (5/21/2024)    Housing Stability     Housing Instability: No                                Physical Exam    ED Triage Vitals [05/29/25 0213]   /68   Pulse 59   Resp 18   Temp 96.9 °F (36.1 °C)   Temp src Temporal   SpO2 98 %   O2 Device None (Room air)       Current Vitals:   Vital Signs  BP: 128/79  Pulse: 62  Resp: 16  Temp: 96.9 °F (36.1 °C)  Temp src: Temporal  MAP (mmHg): 95    Oxygen Therapy  SpO2: 100 %  O2 Device: None (Room air)            Physical Exam  Vitals and nursing note reviewed.   Constitutional:       Appearance: She is well-developed.   HENT:      Head: Normocephalic and atraumatic.   Eyes:      Pupils: Pupils are equal, round, and reactive to light.   Cardiovascular:      Rate and Rhythm: Normal rate and regular rhythm.   Pulmonary:      Effort: Pulmonary effort is normal.      Breath sounds: Normal breath sounds.   Abdominal:      General: Bowel sounds are normal.      Palpations: Abdomen is soft.   Musculoskeletal:         General: No deformity.      Cervical back: Normal range of motion and neck supple.   Skin:     General: Skin is warm and dry.      Capillary Refill: Capillary refill takes less than 2 seconds.   Neurological:      Mental Status: She is alert and oriented to person, place, and time.                 ED Course  Labs Reviewed   COMP METABOLIC PANEL (14) - Abnormal; Notable for the following  components:       Result Value    Glucose 140 (*)     BUN 31 (*)     Calculated Osmolality 297 (*)     All other components within normal limits   CBC WITH DIFFERENTIAL WITH PLATELET - Abnormal; Notable for the following components:    Neutrophil Absolute Prelim 9.00 (*)     Neutrophil Absolute 9.00 (*)     All other components within normal limits   LIPASE - Normal   RAINBOW DRAW LAVENDER   RAINBOW DRAW LIGHT GREEN   RAINBOW DRAW BLUE                                MDM     This is a 75-year-old female presents He with complaints of nausea and vomiting and constipation.  Vital stable arrival patient appears nontoxic examination includes bowel gas pattern on examination.  Minimal tenderness palpation left lower quadrant rebound guarding or rigidity.  Basic labs obtained will obtain CT abdomen pelvis rule out diverticulitis versus colitis versus SBO.    Admission disposition: 5/29/2025  5:23 AM           Medical Decision Making      Disposition and Plan     Clinical Impression:  1. Nausea and vomiting in adult    2. Lower abdominal pain    3. SBO (small bowel obstruction) (HCC)         Disposition:  Admit  5/29/2025  5:23 am    Follow-up:  No follow-up provider specified.        Medications Prescribed:  Current Discharge Medication List                Supplementary Documentation:         Hospital Problems       Present on Admission  Date Reviewed: 12/21/2024   None

## 2025-05-29 NOTE — H&P
[facility] Hospitalist History and Physical      PCP: Cruz Puckett MD    History of Present Illness: This is the story of Ms. Shaylee Oshea who is a 76 y/o F w/ PMHx of Complicated Appendicitis c/b Abscess in April of 2024 s/p drainage, partial resection of abscess, and small bowel resection on 5/23, MVP presenting to the hospital w/ chief complaint of AP, N/V. Patient states that her symptoms began about 24-48 hours, and has not been able to have a bowel movement. Pain was worsening so she decided to come into the hospital for further evaluation.  ED Vitals: wnl  Labs:  Wnl    CTAP: SBO w/ a transition point in the mid abdomen  GNS was consulted, NGT was inserted successfully and patient was admitted to the hospital for further evaluation and treatment.    Patient was given Morphine/Zofran and started on IVF    Past Medical History[1]   Past Surgical History[2]     Prior to Admission Medications[3]    Social History     Tobacco Use    Smoking status: Never    Smokeless tobacco: Never   Substance Use Topics    Alcohol use: Yes     Comment: socially        OBJECTIVE:  /62   Pulse 56   Temp 96.9 °F (36.1 °C) (Temporal)   Resp 16   Ht 5' 5\" (1.651 m)   Wt 110 lb (49.9 kg)   LMP  (LMP Unknown)   SpO2 100%   BMI 18.30 kg/m²   Gen: No acute distress, alert and oriented x3  HEENT: NGT in place  Pulm: Lungs clear bilaterally, normal respiratory effort  CV: Heart with regular rate and rhythm  Abd: Abdomen soft, nontender, non-distended  Skin: no rashes or lesions  Extremities: no edema noted  Neuro:  no focal deficits      Data Review:    Pertinent Labs and Imaging independently reviewed    Assessment/Plan:   Outpatient records or previous hospital records reviewed.   [facility] Hospitalist to continue to follow patient while in house    A/P: 76 y/o F w/ Multiple abdominal surgeries, MVP who presents to the hospital for AP, N/V. Found to have SBO    SBO  Likely 2/2 to adhesions in the setting of multiple  abdominal surgeries  CTAP noted  Plan:  - GNS on consult, NGT in place, defer to them for timing of clamp trial and removal  - NPO w/ sips  - IVF, PRN pain control    A total of 42 minutes taken with patient and coordinating care.  Greater than 50% face to face encounter.      Jer Lee D.O.  [facility] Hospitalist     **Certification      PHYSICIAN Certification of Need for Inpatient Hospitalization - Initial Certification    Patient will require inpatient services that will reasonably be expected to span two midnight's based on the clinical documentation in H+P.   Based on patients current state of illness, I anticipate that, after discharge, patient will require TBD.         [1]   Past Medical History:   COVID    Mitral valve prolapse    Nausea and vomiting in adult    Strep throat    Visual impairment    reading glasses   [2]   Past Surgical History:  Procedure Laterality Date    Arthroscopy of joint unlisted Right 2018          Colonoscopy,diagnostic      Dr. Rayray Vernon,diagnostic  2018    hepatic flexure polyp    Egd N/A 2018    Procedure: ESOPHAGOGASTRODUODENOSCOPY, COLONOSCOPY, POSSIBLE BIOPSY, POSSIBLE POLYPECTOMY 44862, 18888;  Surgeon: Yan Quiñones MD;  Location: [facility]    Enlarge breast with implant Bilateral 2001    Implant left      Implant right      Other surgical history      abdominoplasty    Other surgical history      explant of breast implants    Other surgical history Left     neuroma removal of foot    Rev lower eyelid exten fat pad  10/14/2008    Performed by LISA FERRARA at [facility]    Rev lower eyelid exten fat pad  10/14/2008    Performed by LISA FERRARA at [facility]    Rev upper eyelid w excess skin  10/14/2008    Performed by LISA FERRARA at [facility]    Rev upper eyelid w excess skin  10/14/2008    Performed by L[facility]ERRARA at [facility]  [place]    Shoulder arthroscopy Right 06/2019    manipulation    Upper gi endoscopy,diagnosis  02/28/2018    hiatal hernia, gastric and SB Bx taken   [3]   No current outpatient medications on file.

## 2025-05-29 NOTE — PROGRESS NOTES
NURSING ADMISSION NOTE      Patient admitted via Cart  Oriented to room.  Safety precautions initiated.  Bed in low position.  Call light in reach.  Alert and oriented x4.  RA  NG to left nare. LIS 56 cm.   MPO- okay for ice chips  Lovenox for VTE prophylaxis  XRAY obstruction series tomorrow am   PRN pain medication given for abdominal pain.  Throat pain- chloraseptic spray ordered.   Daughter at bedside,

## 2025-05-29 NOTE — ED QUICK NOTES
Orders for admission, patient is aware of plan and ready to go upstairs. Any questions, please call ED RN JAY at extension [phone].     Patient Covid vaccination status: Fully vaccinated     COVID Test Ordered in ED: None    COVID Suspicion at Admission: N/A    Running Infusions: Medication Infusions[1] None    Mental Status/LOC at time of transport: ALERT X 4    Other pertinent information: NGT IN PLACE. HX OF BOWEL RESECTION THIS YEAR DUE TO ABSCESS FORMATION POST APPY (RUPTURED APPENDIX) PER PT. AFEBRILE  CIWA score: N/A   NIH score:  N/A             [1]

## 2025-05-29 NOTE — CONSULTS
[facility]  Report of Consultation    Merissa Oshea Patient Status:  Inpatient    1950 MRN [id number]   Location [facility] 0SW-A Attending Jer Lee, DO   Hosp Day # 0 PCP Cruz Puckett MD     25    Reason for Consultation:    Surgical Consultation     CC:   Chief Complaint   Patient presents with    Abdomen/Flank Pain    Nausea/Vomiting/Diarrhea        History of Present Illness:    Merissa Oshea is a a(n) 75 year old female. Patient complains of abdominal pain that began yesterday evening. She had associated n/v.   CT scan obtained revealing SBO  NG tube inserted  General Surgery consulted  Patient had appendectomy last year- perforated, subsequent ex lap partial SBR for small bowel perforation POD # 3    She denies any flatus      Past Medical History:    Past Medical History[1]    Past Surgical History:    Past Surgical History[2]    Family History:    Family History[3]    Social History:     reports that she has never smoked. She has never used smokeless tobacco. She reports current alcohol use. She reports that she does not use drugs.    Allergies:    Allergies[4]    Current Medications:    Current Hospital Medications[5]    Home Medications:    Medications Ordered Prior to Encounter[6]    Review of Systems:    10 point review performed pertinent positives and negatives per history of present illness.    Physical Exam:    Blood pressure 120/65, pulse 53, temperature 97.9 °F (36.6 °C), temperature source Temporal, resp. rate 18, height 5' 5\" (1.651 m), weight 110 lb (49.9 kg), SpO2 100%, not currently breastfeeding.    GENERAL: Alert and oriented x 3, well developed, well nourished female, in no apparent distress    SKIN: anicteric    RESPIRATORY: non labored breathing    CARDIOVASCULAR: RRR    ABDOMEN: soft, mildly distended, tenderness in RLQ with guarding    LYMPHATIC: no lymphadenopathy    EXTREMITIES: no cyanosis, clubbing or edema    .    Laboratory Data:  Recent Labs    Lab 05/29/25 0219   WBC 10.6   HGB 13.9   MCV 85.4   .0       Recent Labs   Lab 05/29/25 0219      K 4.0      CO2 30.0   BUN 31*   CREATSERUM 0.95   *   CA 9.9       Recent Labs   Lab 05/29/25 0219   ALT 15   AST 26   ALB 4.7       No results for input(s): \"TROP\" in the last 168 hours.          Radiology:    XR CHEST AP PORTABLE  (CPT=71045)  Result Date: 5/29/2025  PROCEDURE:  XR CHEST AP PORTABLE  (CPT=71045)  TECHNIQUE:  AP chest radiograph was obtained.  COMPARISON:  [facility] , XR, XR CHEST AP PORTABLE  (CPT=71045), 5/21/2024, 9:38 AM.  INDICATIONS:  Verify correct tube placement  PATIENT STATED HISTORY: (As transcribed by Technologist)  NG placement    FINDINGS:  Enteric tube terminates within the stomach.            CONCLUSION:  Enteric tube terminates within the stomach.   LOCATION:  [facility]      Dictated by (CST): Stromberg, LeRoy, MD on 5/29/2025 at 7:05 AM     Finalized by (CST): Stromberg, LeRoy, MD on 5/29/2025 at 7:06 AM          Problem List:    Problem List[7]    Impression:    74 y/o with SBO  CT scan as noted above  Afebrile, no leukocytosis  Appears non toxic, soft, mildly distended with tenderness in RLQ    Plan:    Reviewed with patient recommendations for conservative management   -NPO ice chips ok  -NG to LIS  -serial abdominal exams  -IV fluids  -encourage ambulation  -obstructive series  Explained to patient that if their condition deteriorates, they may require surgical management.     Patient seen and examined with MOO Gonzalez  [facility]  General Surgery  5/29/2025    Addendum  Agree as above  Benign abdominal exam and minimally distended  Will continue with current conservative management  Will obtain OBS and if not improving then will obtain SBFT study  Voiced understanding         [1]   Past Medical History:   COVID    Mitral valve prolapse    Nausea and vomiting in adult    Strep throat    Visual impairment    reading glasses    [2]   Past Surgical History:  Procedure Laterality Date    Arthroscopy of joint unlisted Right 2018          Colonoscopy,diagnostic      Dr. Seo    Colonoscopy,diagnostic  2018    hepatic flexure polyp    Egd N/A 2018    Procedure: ESOPHAGOGASTRODUODENOSCOPY, COLONOSCOPY, POSSIBLE BIOPSY, POSSIBLE POLYPECTOMY 28861, 31686;  Surgeon: Yan Quiñones MD;  Location: [facility]    Enlarge breast with implant Bilateral 2001    Implant left      Implant right      Other surgical history      abdominoplasty    Other surgical history      explant of breast implants    Other surgical history Left     neuroma removal of foot    Rev lower eyelid exten fat pad  10/14/2008    Performed by LISA FERRARA at [facility]    Rev lower eyelid exten fat pad  10/14/2008    Performed by LISA FERRARA at [facility]    Rev upper eyelid w excess skin  10/14/2008    Performed by LISA FERRARA at [facility]    Rev upper eyelid w excess skin  10/14/2008    Performed by LISA FERRARA at [facility]    Shoulder arthroscopy Right 2019    manipulation    Upper gi endoscopy,diagnosis  2018    hiatal hernia, gastric and SB Bx taken   [3]   Family History  Problem Relation Age of Onset    Cancer Mother         lung/brain/liver    Other (Other) Other         self-MVP    Heart Disorder Father     Hypertension Neg     Lipids Neg     Obesity Neg     Psychiatric Neg    [4] No Known Allergies  [5]   Current Facility-Administered Medications:     sodium chloride 0.9% infusion, , Intravenous, Continuous    enoxaparin (Lovenox) 40 MG/0.4ML SUBQ injection 40 mg, 40 mg, Subcutaneous, Daily    ondansetron (Zofran) 4 MG/2ML injection 4 mg, 4 mg, Intravenous, Q6H PRN    metoclopramide (Reglan) 5 mg/mL injection 5 mg, 5 mg, Intravenous, Q8H PRN    morphINE PF 4 MG/ML injection 0.52 mg, 0.52 mg, Intravenous, Q2H PRN **OR** morphINE PF 4  MG/ML injection 1 mg, 1 mg, Intravenous, Q2H PRN **OR** morphINE PF 4 MG/ML injection 2 mg, 2 mg, Intravenous, Q2H PRN    phenol (Chloraseptic) 1.4 % oral liquid spray, , Oral, Q2H PRN  [6]   No current facility-administered medications on file prior to encounter.     Current Outpatient Medications on File Prior to Encounter   Medication Sig Dispense Refill    HYDROcodone-acetaminophen 5-325 MG Oral Tab Take 1-2 tablets by mouth every 6 (six) hours as needed for Pain. (Patient not taking: Reported on 6/7/2024) 20 tablet 0   [7]   Patient Active Problem List  Diagnosis    Lumbar stenosis    Decreased strength of upper extremity    Plantar neuroma of left foot    Capsular contracture of breast implant    Depressive disorder    Hearing loss    Martinez's metatarsalgia    Bradycardia    Diarrhea of presumed infectious origin    Acute appendicitis with perforation, generalized peritonitis, and abscess, unspecified whether gangrene present    Dehydration    Hypokalemia    Hyponatremia    Postoperative pain    Nausea and vomiting in adult    Lower abdominal pain    SBO (small bowel obstruction) ([facility])

## 2025-05-30 ENCOUNTER — APPOINTMENT (OUTPATIENT)
Dept: GENERAL RADIOLOGY | Facility: HOSPITAL | Age: 75
End: 2025-05-30
Attending: PHYSICIAN ASSISTANT
Payer: MEDICARE

## 2025-05-30 VITALS
OXYGEN SATURATION: 98 % | HEART RATE: 55 BPM | TEMPERATURE: 98 F | RESPIRATION RATE: 16 BRPM | WEIGHT: 110 LBS | SYSTOLIC BLOOD PRESSURE: 113 MMHG | DIASTOLIC BLOOD PRESSURE: 67 MMHG | HEIGHT: 65 IN | BODY MASS INDEX: 18.33 KG/M2

## 2025-05-30 LAB
ANION GAP SERPL CALC-SCNC: 10 MMOL/L (ref 0–18)
BUN BLD-MCNC: 16 MG/DL (ref 9–23)
CALCIUM BLD-MCNC: 8.3 MG/DL (ref 8.7–10.6)
CHLORIDE SERPL-SCNC: 107 MMOL/L (ref 98–112)
CO2 SERPL-SCNC: 27 MMOL/L (ref 21–32)
CREAT BLD-MCNC: 0.83 MG/DL (ref 0.55–1.02)
EGFRCR SERPLBLD CKD-EPI 2021: 74 ML/MIN/1.73M2 (ref 60–?)
ERYTHROCYTE [DISTWIDTH] IN BLOOD BY AUTOMATED COUNT: 14.3 %
GLUCOSE BLD-MCNC: 89 MG/DL (ref 70–99)
HCT VFR BLD AUTO: 37.1 % (ref 35–48)
HGB BLD-MCNC: 12.6 G/DL (ref 12–16)
MAGNESIUM SERPL-MCNC: 1.9 MG/DL (ref 1.6–2.6)
MCH RBC QN AUTO: 29.5 PG (ref 26–34)
MCHC RBC AUTO-ENTMCNC: 34 G/DL (ref 31–37)
MCV RBC AUTO: 86.9 FL (ref 80–100)
OSMOLALITY SERPL CALC.SUM OF ELEC: 299 MOSM/KG (ref 275–295)
PLATELET # BLD AUTO: 178 10(3)UL (ref 150–450)
POTASSIUM SERPL-SCNC: 3.6 MMOL/L (ref 3.5–5.1)
RBC # BLD AUTO: 4.27 X10(6)UL (ref 3.8–5.3)
SODIUM SERPL-SCNC: 144 MMOL/L (ref 136–145)
WBC # BLD AUTO: 4.6 X10(3) UL (ref 4–11)

## 2025-05-30 PROCEDURE — 83735 ASSAY OF MAGNESIUM: CPT | Performed by: INTERNAL MEDICINE

## 2025-05-30 PROCEDURE — 74019 RADEX ABDOMEN 2 VIEWS: CPT | Performed by: PHYSICIAN ASSISTANT

## 2025-05-30 PROCEDURE — 80048 BASIC METABOLIC PNL TOTAL CA: CPT | Performed by: INTERNAL MEDICINE

## 2025-05-30 PROCEDURE — 85027 COMPLETE CBC AUTOMATED: CPT | Performed by: INTERNAL MEDICINE

## 2025-05-30 RX ORDER — IBUPROFEN 600 MG/1
600 TABLET, FILM COATED ORAL ONCE
Status: COMPLETED | OUTPATIENT
Start: 2025-05-30 | End: 2025-05-30

## 2025-05-30 NOTE — PROGRESS NOTES
[facility] Hospitalist Progress Note       SUBJECTIVE:  Pt feels well  Ng clamped        OBJECTIVE:  Scheduled Meds: Scheduled Medications[1]  Continuous Infusions: Medication Infusions[2]  PRN Meds: PRN Medications[3]    Vitals  Vitals:    05/30/25 0500   BP: 116/64   Pulse: 53   Resp: 16   Temp: 98.4 °F (36.9 °C)         Exam   Gen-    no acute distress, alert and oriented x 3   RESP-   Lungs CTA, normal respiratory effort  CV-      Heart RRR, no mgr  Abd-    soft, nondistended, nontender, bowel sounds present  Skin-    no rash  Neuro-  no focal neurologic deficits  Ext-      No edema in extremities   Psych- alert and oriented x 3     Labs:     Recent Labs   Lab 05/29/25 0219 05/30/25  0636   WBC 10.6 4.6   HGB 13.9 12.6   MCV 85.4 86.9   .0 178.0       Recent Labs   Lab 05/29/25 0219 05/30/25  0636    144   K 4.0 3.6    107   CO2 30.0 27.0   BUN 31* 16   CREATSERUM 0.95 0.83   CA 9.9 8.3*   MG  --  1.9   * 89       Recent Labs   Lab 05/29/25  0219   ALT 15   AST 26   ALB 4.7       No results for input(s): \"PGLU\" in the last 168 hours.    AP:  A/P: 76 y/o F w/ Multiple abdominal surgeries, MVP who presents to the hospital for AP, N/V. Found to have SBO     SBO  Likely 2/2 to adhesions in the setting of multiple abdominal surgeries  CTAP noted  Plan:  - GNS on consult, NGT in place, defer to them for timing of clamp trial and removal  - NPO w/ sips  - IVF, PRN pain control           Rosita Barillas MD             [1]    enoxaparin  40 mg Subcutaneous Daily   [2]    sodium chloride 100 mL/hr at 05/30/25 0513   [3]   ondansetron    metoclopramide    morphINE **OR** morphINE **OR** morphINE    phenol

## 2025-05-30 NOTE — PLAN OF CARE
Ng clamp trial completed. 20cc out. Ng removed and started on clear liquids. Tolerated clear liquids well. Updated Taylor CORTÉS. Ok to advance to full liquids and if tolerated can discharge and advance to low fiber at home. Completed full liquids and tolerated well. Updated hospitalist.

## 2025-05-30 NOTE — PLAN OF CARE
Cleared for discharge. Discharge instructions given/reviewed with patient, verbalized understanding. Friend to take home.

## 2025-05-30 NOTE — PROGRESS NOTES
[facility]  Progress Note    Merissa Oshea Patient Status:  Inpatient    1950 MRN [id number]   Location [facility] 0SW-A Attending Rosita Barillas MD   Hosp Day # 1 PCP Cruz Puckett MD     Subjective:  Patient passed a little gas after getting up.  She is feeling better.  She tolerated ice chips.    Objective/Physical Exam:  General: No apparent distress.  Vital Signs:  Blood pressure 128/66, pulse 59, temperature 97.4 °F (36.3 °C), temperature source Oral, resp. rate 16, height 5' 5\" (1.651 m), weight 110 lb (49.9 kg), SpO2 98%, not currently breastfeeding.    Abdomen:  soft and non-distended  Extremities:  No calf tenderness       Intake/Output Summary (Last 24 hours) at 2025 0843  Last data filed at 2025 0748  Gross per 24 hour   Intake 1947 ml   Output 900 ml   Net 1047 ml       Labs:  Lab Results   Component Value Date    WBC 4.6 2025    HGB 12.6 2025    HCT 37.1 2025    .0 2025    CREATSERUM 0.83 2025    BUN 16 2025     2025    K 3.6 2025     2025    CO2 27.0 2025    GLU 89 2025    CA 8.3 2025    MG 1.9 2025       Assessment/Plan: SBO - resolving    -keep the NGT clamped and obtain OBS today  -check residual in 6 hours and if < 150 ml ok to remove and start on clears  -if the output is high then reconnect to suction and will obtain SBFT study  -encouraged ambulation  -voiced understanding    Dilcia Garcia MD  2025  8:43 AM

## 2025-05-30 NOTE — DISCHARGE SUMMARY
[facility] Hospitalist Discharge Summary    Patient ID  Merissa Oshea  [id number]  75 year old  5/12/1950  Admit date: 5/29/2025  Discharge date: No discharge date for patient encounter. ***  Attending: Rosita Barillas MD   Primary Care Physician: Cruz Puckett MD ***  Reason for admission: *** (see HPI on HP for further detail)  Discharge condition: {condition:68996}  Disposition: {disposition:23417}    Important follow up:  -PCP within 7*** 14*** days or other ***  -specialists: ***    -labs: ***  -radiology: ***    Additional patient instructions  ***n/a    Discharge med list     Medication List        STOP taking these medications      HYDROcodone-acetaminophen 5-325 MG Tabs  Commonly known as: Norco              Discharge Diagnoses/Hospital course:  ***    Consults:  IP CONSULT TO HOSPITALIST  IP CONSULT TO GENERAL SURGERY    Radiology:  XR ABDOMEN OBSTRUCTIVE SERIES ROUTINE(2 VW)(CPT=74019)  Result Date: 5/30/2025  PROCEDURE:  XR ABDOMEN OBSTRUCTIVE SERIES ROUTINE(2 VW)(CPT=74019)  TECHNIQUE:  2 view obstructive series of the abdomen and pelvis were obtained.  COMPARISON:  [facility] , CT, CT ABDOMEN+PELVIS(CONTRAST ONLY)(CPT=74177), 5/29/2025, 4:50 AM.  INDICATIONS:  SBO  PATIENT STATED HISTORY: (As transcribed by Technologist)  Patient stated she has a bowel obstruction.    FINDINGS:  Enteric tube tip is projected over the stomach.  Gas in nondistended loops of large and small bowel throughout the abdomen.  No free intraperitoneal air.  No mass or abnormal calcification.  Degenerative changes in lower lumbar spine.            CONCLUSION:  Enteric tube tip in stomach.  No bowel distention.   LOCATION:  [facility]    Dictated by (CST): Luciano Bhat MD on 5/30/2025 at 10:31 AM     Finalized by (CST): Luciano Bhat MD on 5/30/2025 at 10:32 AM       CT ABDOMEN+PELVIS(CONTRAST ONLY)(CPT=74177)  Result Date: 5/29/2025  PROCEDURE:  CT ABDOMEN+PELVIS (CONTRAST ONLY) (CPT=74177)  COMPARISON:  [facility] , CT, CT  ABDOMEN+PELVIS(CONTRAST ONLY)(CPT=74177), 4/19/2024, 3:24 PM.  [facility] , CT, CT DRAIN ABSCESS APPENDIX (CPT=49406), 4/20/2024, 10:49 AM.  INDICATIONS:  UPPER ABD PAIN WITH VOMITING  TECHNIQUE:  CT scanning was performed from the dome of the diaphragm to the pubic symphysis with non-ionic intravenous contrast material. Post contrast coronal MPR imaging was performed.  Dose reduction techniques were used. Dose information is transmitted to the ACR (American College of Radiology) NRDR (National Radiology Data Registry) which includes the Dose Index Registry.  PATIENT STATED HISTORY:(As transcribed by Technologist)  Right and left upper quadrant pain, nausea, vomiting.   CONTRAST USED:  65cc of Isovue 370  FINDINGS:  LUNG BASE:  Unremarkable. LIVER:  Punctate hypodense foci in the liver are too small to accurately characterize. BILIARY:  Unremarkable. SPLEEN:  Unremarkable. PANCREAS:  Unremarkable. ADRENALS:  Unremarkable. KIDNEYS:  Unremarkable. AORTA/VASCULAR:  Scattered atherosclerosis. RETROPERITONEUM:  Unremarkable. BOWEL/MESENTERY:  There are dilated loops of small bowel in the left hemiabdomen with a transition point at a small bowel anastomosis in the mid abdomen (series 2, image 55).  There is fecalized material at the transition point consistent with a small bowel obstruction at the level of the anastomosis.  Mild mesenteric congestion is noted.  No free air.  No drainable fluid collection.  The appendix is not identified.  No inflammatory changes are seen near the cecum.  No diverticulitis. ABDOMINAL WALL:  Unremarkable. PELVIC ORGANS:  Unremarkable. LYMPH NODES:  Unremarkable. BONES:  Degenerative changes in the spine. OTHER:  None.            CONCLUSION:  Imaging findings are consistent with a small bowel obstruction with a transition point at the level of the small bowel anastomosis in the mid abdomen.  Surgical consultation is recommended.   LOCATION:  [place]   Dictated by (CST): Stromberg, LeRoy, MD on  5/29/2025 at 9:35 AM     Finalized by (CST): Stromberg, LeRoy, MD on 5/29/2025 at 9:45 AM       XR CHEST AP PORTABLE  (CPT=71045)  Result Date: 5/29/2025  PROCEDURE:  XR CHEST AP PORTABLE  (CPT=71045)  TECHNIQUE:  AP chest radiograph was obtained.  COMPARISON:  [facility] , XR, XR CHEST AP PORTABLE  (CPT=71045), 5/21/2024, 9:38 AM.  INDICATIONS:  Verify correct tube placement  PATIENT STATED HISTORY: (As transcribed by Technologist)  NG placement    FINDINGS:  Enteric tube terminates within the stomach.            CONCLUSION:  Enteric tube terminates within the stomach.   LOCATION:  [facility]      Dictated by (CST): Stromberg, LeRoy, MD on 5/29/2025 at 7:05 AM     Finalized by (CST): Stromberg, LeRoy, MD on 5/29/2025 at 7:06 AM         Operative reports:          Day of discharge exam:  Vitals:    05/30/25 1540   BP: 113/67   Pulse: 55   Resp: 16   Temp: 98 °F (36.7 °C)     No acute distress, alert and oriented***  Lungs clear  Heart regular  Abdomen benign     Patient and/or family had opportunity to ask questions and expressed understanding and agreement with therapeutic plan as outlined      >30 minutes spent on discharge    Rosita Barillas MD

## 2025-05-30 NOTE — PLAN OF CARE
Problem: Patient/Family Goals  Goal: Patient/Family Long Term Goal  Description: Patient's Long Term Goal:     Interventions:  -   - See additional Care Plan goals for specific interventions  Outcome: Progressing  Goal: Patient/Family Short Term Goal  Description: Patient's Short Term Goal:     Interventions:   -   - See additional Care Plan goals for specific interventions  Outcome: Progressing     Patient alert x4, VSS. NG to LIS. NPO with ice chips. IV fluids infusing. Xray obstruction series in AM.

## 2025-05-30 NOTE — DIETARY NOTE
[facility]   part of [facility]    NUTRITION ASSESSMENT    Pt does not meet malnutrition criteria at this time.      NUTRITION INTERVENTION:    Meal and Snacks - Monitor and encourage adequate PO intake. Clear Liquid. Diet advancement per Surgery.  Medical Food Supplements -berry Ensure Clear TID. Rationale/use for oral supplements discussed.  Nutrition Education - Provided and discussed handout on Low Fiber/Fiber Restricted Nutrition Therapy w/ list of foods recommended, foods to avoid/limit, and sample menus. Discussed diet will be advanced by Surgery.        PATIENT STATUS:     5/30/25- 76 y/o female admitted w/ N/V. Pt seen d/t low BMI:18.31. Pt reported abdominal pain, bloating, and N/V started on Wed evening (5/28). Stated prior to this, she had a good appetite and was eating well. Follows a lacto-ovo vegetarian diet. NG placed to LIS on 5/29. NG removed today. Diet advanced to Clear Liquid. Meal ordered. Pt agreeable to add Ensure Clear to maximize nutrition intakes. Provided low fiber diet info for when diet advances per Surgery.  PMH:appendicitis w/ abscess, s/p drainage (4/2024), partial resection of abscess and small bowel resection on 5/23/24.      ANTHROPOMETRICS:  Ht: 165.1 cm (5' 5\")  Wt: 49.9 kg (110 lb).   BMI: Body mass index is 18.31 kg/m².  IBW: 56.8 kg      WEIGHT HISTORY:   Weight loss: No    -Pt reported wt of 95 lb about 1 year ago after GI surgeries. Has gained some wt back. Reported current wt of 110 lb.     Wt Readings from Last 10 Encounters:   05/29/25 49.9 kg (110 lb)   12/21/24 47.2 kg (104 lb)   06/14/24 46.1 kg (101 lb 9.6 oz)   06/13/24 45.9 kg (101 lb 3.2 oz)   06/12/24 46.8 kg (103 lb 3.2 oz)   06/11/24 46.4 kg (102 lb 3.2 oz)   06/10/24 46.7 kg (103 lb)   06/09/24 46.3 kg (102 lb)   06/07/24 45.8 kg (101 lb)   05/21/24 47.6 kg (105 lb)        NUTRITION:  Diet:       Procedures    Clear liquid diet Is Patient on Accuchecks? No      Food Allergies:  No  Cultural/Ethnic/Gnosticist Preferences Addressed: Yes    Percent Meals Eaten (last 3 days)       Date/Time Percent Meals Eaten (%)    05/30/25 0748 0 %     Percent Meals Eaten (%): pt. NPO at 05/30/25 0748            GI system review: resolving SBO Last BM Date: 05/29/25  Skin and wounds: skin intact. Just got a tattoo on her L forearm 3 weeks ago    NUTRITION RELATED PHYSICAL FINDINGS:     1. Body Fat/Muscle Mass: mild muscle depletion Temple region and Clavicle region     2. Fluid Accumulation: none per RN documentation    NUTRITION PRESCRIPTION: 49.9 kg Actual Body Weight-5/29  Calories: 6561-2287 calories/day (30-35 kcal/kg)  Protein: 50-75 grams protein/day (1.0-1.5 grams protein per kg)  Fluid: ~1 ml/kcal or per MD discretion    NUTRITION DIAGNOSIS/PROBLEM:  Underweight related to physiological causes as evidenced by low BMI; 18.31.    Inadequate energy intakes related to altered GI function (SBO) and decreased ability to consume sufficient energy intakes as evidenced by NPO/Clear Liquid status.     MONITOR AND EVALUATE/NUTRITION GOALS:  PO intake of 75% of meals TID - New  PO intake of 75% of oral nutrition supplement/s - New  Weight stable within 1 to 2 lbs during admission - New  Gradual weight gain of at least .5 lbs per week - New  Return to PO intake or advance diet in 24-48 hrs - New      MEDICATIONS:  IVF:NS at 100 ml/hr    LABS:  Glu:89    Pt is at Moderate nutrition risk      Ilana Cali MS, RD, LDN  Clinical Dietitian  Ext:[phone]

## 2025-05-30 NOTE — PLAN OF CARE
Assumed patient care this morning.   Alert, awake. Breathing unlabored. Denies pain. Ambulating in hallway. Passing gas this morning. Abdomen soft. Seen by surgery, plan for trial of clamping NG.

## 2025-06-05 NOTE — DISCHARGE SUMMARY
[facility] Internal Medicine Hospitalist Discharge Summary     Patient ID:  Merissa Oshea  75 year old  5/12/1950    Admission Date/Time  5/29/2025  2:26 AM  Discharge Date  5/30/2025     PCP  Cruz Puckett MD     Discharging Hospitalist:  Rosita Barillas MD    Disposition:  Home     Follow Up Appointments  Cruz Puckett MD  [address]  [address]  [place] [facility] [address] [phone]    Schedule an appointment as soon as possible for a visit in 1 week(s)      Dilcia Garcia MD  [address]  [address]  [facility] [id number] [phone]    Follow up  As needed      Primary Hospital Problems/Hospital Course Summary  SBO    Medication Changes  None     Important Follow Up Items  Labs: no   Incidental Findings: no    Procedures/Diagnostics  No     Operative Procedures:   no    Change in Code Status:  no      Hospital Course/Secondary Diagnoses:      A/P: 76 y/o F w/ Multiple abdominal surgeries, MVP who presents to the hospital for AP, N/V. Found to have SBO     SBO  Likely 2/2 to adhesions in the setting of multiple abdominal surgeries  CTAP noted  Plan:  - GNS on consult, NGT in place, defer to them for timing of clamp trial and removal  - doing well on day of dc tolerating diet        Consults: IP CONSULT TO GENERAL SURGERY    Discharge Medications       Medication List        STOP taking these medications      HYDROcodone-acetaminophen 5-325 MG Tabs  Commonly known as: Norco            I reconciled current and discharge medications on the day of discharge.    Imaging/Diagnostic Reports  XR ABDOMEN OBSTRUCTIVE SERIES ROUTINE(2 VW)(CPT=74019)  Result Date: 5/30/2025  PROCEDURE:  XR ABDOMEN OBSTRUCTIVE SERIES ROUTINE(2 VW)(CPT=74019)  TECHNIQUE:  2 view obstructive series of the abdomen and pelvis were obtained.  COMPARISON:  [facility] , CT, CT ABDOMEN+PELVIS(CONTRAST ONLY)(CPT=74177), 5/29/2025, 4:50 AM.  INDICATIONS:  SBO  PATIENT STATED HISTORY:  (As transcribed by Technologist)  Patient stated she has a bowel obstruction.    FINDINGS:  Enteric tube tip is projected over the stomach.  Gas in nondistended loops of large and small bowel throughout the abdomen.  No free intraperitoneal air.  No mass or abnormal calcification.  Degenerative changes in lower lumbar spine.            CONCLUSION:  Enteric tube tip in stomach.  No bowel distention.   LOCATION:  [facility]    Dictated by (CST): Luciano Bhat MD on 5/30/2025 at 10:31 AM     Finalized by (CST): Luciano Bhat MD on 5/30/2025 at 10:32 AM       CT ABDOMEN+PELVIS(CONTRAST ONLY)(CPT=74177)  Result Date: 5/29/2025  PROCEDURE:  CT ABDOMEN+PELVIS (CONTRAST ONLY) (CPT=74177)  COMPARISON:  [facility] , CT, CT ABDOMEN+PELVIS(CONTRAST ONLY)(CPT=74177), 4/19/2024, 3:24 PM.  [facility] , CT, CT DRAIN ABSCESS APPENDIX (CPT=49406), 4/20/2024, 10:49 AM.  INDICATIONS:  UPPER ABD PAIN WITH VOMITING  TECHNIQUE:  CT scanning was performed from the dome of the diaphragm to the pubic symphysis with non-ionic intravenous contrast material. Post contrast coronal MPR imaging was performed.  Dose reduction techniques were used. Dose information is transmitted to the ACR (American College of Radiology) NRDR (National Radiology Data Registry) which includes the Dose Index Registry.  PATIENT STATED HISTORY:(As transcribed by Technologist)  Right and left upper quadrant pain, nausea, vomiting.   CONTRAST USED:  65cc of Isovue 370  FINDINGS:  LUNG BASE:  Unremarkable. LIVER:  Punctate hypodense foci in the liver are too small to accurately characterize. BILIARY:  Unremarkable. SPLEEN:  Unremarkable. PANCREAS:  Unremarkable. ADRENALS:  Unremarkable. KIDNEYS:  Unremarkable. AORTA/VASCULAR:  Scattered atherosclerosis. RETROPERITONEUM:  Unremarkable. BOWEL/MESENTERY:  There are dilated loops of small bowel in the left hemiabdomen with a transition point at a small bowel anastomosis in the mid abdomen (series 2, image 55).  There is fecalized  material at the transition point consistent with a small bowel obstruction at the level of the anastomosis.  Mild mesenteric congestion is noted.  No free air.  No drainable fluid collection.  The appendix is not identified.  No inflammatory changes are seen near the cecum.  No diverticulitis. ABDOMINAL WALL:  Unremarkable. PELVIC ORGANS:  Unremarkable. LYMPH NODES:  Unremarkable. BONES:  Degenerative changes in the spine. OTHER:  None.            CONCLUSION:  Imaging findings are consistent with a small bowel obstruction with a transition point at the level of the small bowel anastomosis in the mid abdomen.  Surgical consultation is recommended.   LOCATION:  [facility]   Dictated by (CST): Stromberg, LeRoy, MD on 5/29/2025 at 9:35 AM     Finalized by (CST): Stromberg, LeRoy, MD on 5/29/2025 at 9:45 AM       XR CHEST AP PORTABLE  (CPT=71045)  Result Date: 5/29/2025  PROCEDURE:  XR CHEST AP PORTABLE  (CPT=71045)  TECHNIQUE:  AP chest radiograph was obtained.  COMPARISON:  [facility] , XR, XR CHEST AP PORTABLE  (CPT=71045), 5/21/2024, 9:38 AM.  INDICATIONS:  Verify correct tube placement  PATIENT STATED HISTORY: (As transcribed by Technologist)  NG placement    FINDINGS:  Enteric tube terminates within the stomach.            CONCLUSION:  Enteric tube terminates within the stomach.   LOCATION:  [facility]      Dictated by (CST): Stromberg, LeRoy, MD on 5/29/2025 at 7:05 AM     Finalized by (CST): Stromberg, LeRoy, MD on 5/29/2025 at 7:06 AM           Patient instructions:      I as the attending physician reconciled the current and discharge medications on day of discharge.     Discharge Medication List as of 5/30/2025  6:11 PM        STOP taking these medications       HYDROcodone-acetaminophen 5-325 MG Oral Tab              Activity: as tolerated  Diet: regular diet      Exam on day of discharge:     Vitals:    05/30/25 1540   BP: 113/67   Pulse: 55   Resp: 16   Temp: 98 °F (36.7 °C)       Physical Exam:   General: no acute  distress  Heart: RRR  Lungs: CTAB  Abd: Soft, NT, ND  Neuro: no focal deficits      Total time coordinating care for discharge: Greater than 30 minutes    Rosita Barillas MD

## 2025-06-05 NOTE — PROGRESS NOTES
Physician Clarification  Additional information related to the patient's BMI of 18.31 kg/m²   Underweight, BMI 18.31 kg/m²   This note is part of the patient's medical record.

## (undated) DEVICE — SYRINGE 50ML LL TIP

## (undated) DEVICE — CLOSING BUNDLE: Brand: MEDLINE INDUSTRIES, INC.

## (undated) DEVICE — 3M™ IOBAN™ 2 ANTIMICROBIAL INCISE DRAPE 6648EZ: Brand: IOBAN™ 2

## (undated) DEVICE — 40580 - THE PINK PAD - ADVANCED TRENDELENBURG POSITIONING KIT: Brand: 40580 - THE PINK PAD - ADVANCED TRENDELENBURG POSITIONING KIT

## (undated) DEVICE — SURG GL, SENSICARE PI ORTHO LT,LF,PF,8.5: Brand: MEDLINE

## (undated) DEVICE — E-Z CLEAN, NON-STICK, PTFE COATED, ELECTROSURGICAL BLADE ELECTRODE, MODIFIED EXTENDED INSULATION, 4 INCH (10.2 CM): Brand: MEGADYNE

## (undated) DEVICE — TRAY CATH 16FR F INCL BARDX IC COMPLT CARE

## (undated) DEVICE — ANCHOR TISSUE RETRIEVAL SYSTEM, BAG SIZE 250 ML, PORT SIZE 10 MM: Brand: ANCHOR TISSUE RETRIEVAL SYSTEM

## (undated) DEVICE — GOWN SURG AERO CHROME XXL

## (undated) DEVICE — ADHESIVE MASTISOL 2/3CC VL

## (undated) DEVICE — OCCLUSIVE GAUZE STRIP OVERWRAP,3% BISMUTH TRIBROMOPHENATE IN PETROLATUM BLEND: Brand: XEROFORM

## (undated) DEVICE — 3.0 MM INTEGRATED CABLE WAND ICW,                                    SABER 30, 30 DEGREE: Brand: COBLATION

## (undated) DEVICE — KENDALL SCD EXPRESS SLEEVES, KNEE LENGTH, MEDIUM: Brand: KENDALL SCD

## (undated) DEVICE — SUTURE ETHILON 3-0 FSL

## (undated) DEVICE — TROCARS: Brand: KII® BALLOON BLUNT TIP SYSTEM

## (undated) DEVICE — Device

## (undated) DEVICE — VIOLET BRAIDED (POLYGLACTIN 910), SYNTHETIC ABSORBABLE SUTURE: Brand: COATED VICRYL

## (undated) DEVICE — 4.0 CM ACROMIOBLASTER STRAIGHT                                    BURRS, POWER/EP-1, SAGE GREEN, 8000                                    MAXIMUM RPM, PACKAGED 6 PER BOX, STERILE

## (undated) DEVICE — ABDOMINAL PAD: Brand: DERMACEA

## (undated) DEVICE — DRAPE HALF 40X58 DYNJP2410

## (undated) DEVICE — CANNULA 7MM TWIST AR-6570

## (undated) DEVICE — 3M™ TEGADERM™ TRANSPARENT FILM DRESSING FRAME STYLE, 1626W, 4 IN X 4-3/4 IN (10 CM X 12 CM), 50/CT 4CT/CASE: Brand: 3M™ TEGADERM™

## (undated) DEVICE — TROCAR: Brand: KII® SLEEVE

## (undated) DEVICE — TUBING DW OUTFLOW

## (undated) DEVICE — LOADING UNIT WITH DST SERIES TECHNOLOGY: Brand: GIA

## (undated) DEVICE — SPONGE GZ 4IN X 4IN RAYON POLY 6 PLY UNIQUE

## (undated) DEVICE — POUCH SPECIMEN WIRE 6X3 250ML

## (undated) DEVICE — SKIN STAPLE REMOVER KIT: Brand: MEDLINE INDUSTRIES, INC.

## (undated) DEVICE — ANTIBACTERIAL VIOLET BRAIDED (POLYGLACTIN 910), SYNTHETIC ABSORBABLE SUTURE: Brand: COATED VICRYL

## (undated) DEVICE — SUT PERMA- 2-0 30IN FSL NABSRB BLK L30MM 3

## (undated) DEVICE — GOWN,SIRUS,FABRIC-REINFORCED,X-LARGE: Brand: MEDLINE

## (undated) DEVICE — 4.5 MM FULL RADIUS ELITE STRAIGHT                                    DISPOSABLE BLADES, MAROON,PACKAGED 6                                    PER BOX, STERILE

## (undated) DEVICE — STERILE POLYISOPRENE POWDER-FREE SURGICAL GLOVES: Brand: PROTEXIS

## (undated) DEVICE — PROXIMATE SKIN STAPLERS (35 WIDE) CONTAINS 35 STAINLESS STEEL STAPLES (FIXED HEAD): Brand: PROXIMATE

## (undated) DEVICE — EVACUATOR SUR 100CC SIL BLB WND

## (undated) DEVICE — GAUZE,SPONGE,4"X4",12PLY,STERILE,LF,2'S: Brand: MEDLINE

## (undated) DEVICE — SUT PERMA- 2-0 30IN SH NABSRB BLK L26MM 1/

## (undated) DEVICE — DRAIN SUR 19FR L0.25IN 3/4 FLUT 3/16IN TRCR

## (undated) DEVICE — SOLUTION IRRIG 1000ML 0.9% NACL USP BTL

## (undated) DEVICE — SHEET,DRAPE,70X100,STERILE: Brand: MEDLINE

## (undated) DEVICE — 450 ML BOTTLE OF 0.05% CHLORHEXIDINE GLUCONATE IN 99.95% STERILE WATER FOR IRRIGATION, USP AND APPLICATOR.: Brand: IRRISEPT ANTIMICROBIAL WOUND LAVAGE

## (undated) DEVICE — STAPLER WITH DST SERIES TECHNOLOGY: Brand: TA

## (undated) DEVICE — SHOULDER ARTHROSCOPY CDS-LF: Brand: MEDLINE INDUSTRIES, INC.

## (undated) DEVICE — DRESSING ANTIMIC 3.5 X 12 IN AQCEL AG ADVNTG

## (undated) DEVICE — DRAPE IRRIG FLD WRM W44XL44IN W/ AORN STD

## (undated) DEVICE — SOL  .9 3000ML

## (undated) DEVICE — E-Z CLEAN, NON-STICK, PTFE COATED, ELECTROSURGICAL BLADE ELECTRODE, 2.75 INCH (7 CM): Brand: MEGADYNE

## (undated) DEVICE — SUT PDS II 1 60IN TP-1 ABSRB VLT L65MM 1/2

## (undated) DEVICE — CURVED, LARGE JAW, OPEN SEALER/DIVIDER NANO-COATED: Brand: LIGASURE IMPACT

## (undated) DEVICE — LAPAROTOMY SPONGE - RF AND X-RAY DETECTABLE PRE-WASHED: Brand: SITUATE

## (undated) DEVICE — STAPLER WITH DST SERIES TECHNOLOGY: Brand: GIA

## (undated) DEVICE — 3M™ MICROPORE™ TAPE, 1530-2: Brand: 3M™ MICROPORE™

## (undated) DEVICE — SLEEVE COMPR MD KNEE LEN SGL USE KENDALL SCD

## (undated) DEVICE — TROCAR: Brand: KII FIOS FIRST ENTRY

## (undated) DEVICE — SUT PERMA- 2-0 30IN NABSRB BLK TIE SILK

## (undated) DEVICE — DRAPE,U/SHT,SPLIT,FILM,60X84,STERILE: Brand: MEDLINE

## (undated) DEVICE — COVER LT HNDL RIG FOR SUR CAM DISP

## (undated) DEVICE — STANDARD HYPODERMIC NEEDLE,POLYPROPYLENE HUB: Brand: MONOJECT

## (undated) DEVICE — #11 STERILE BLADE: Brand: POLYMER COATED BLADES

## (undated) DEVICE — SUT PERMA- 0 30IN NABSRB BLK TIE SILK

## (undated) DEVICE — MARYLAND JAW LAPAROSCOPIC SEALER/DIVIDER COATED: Brand: LIGASURE

## (undated) DEVICE — UNDYED BRAIDED (POLYGLACTIN 910), SYNTHETIC ABSORBABLE SUTURE: Brand: COATED VICRYL

## (undated) DEVICE — LAP CHOLE/APPY CDS-LF: Brand: MEDLINE INDUSTRIES, INC.

## (undated) DEVICE — SUT PERMA- 3-0 18IN SH NABSRB BLK CR 26MM

## (undated) DEVICE — CONVERTORS STOCKINETTE: Brand: CONVERTORS

## (undated) DEVICE — 3M™ TEGADERM™ TRANSPARENT FILM DRESSING FRAME STYLE, 1624W, 2-3/8 IN X 2-3/4 IN (6 CM X 7 CM), 100/CT 4CT/CASE: Brand: 3M™ TEGADERM™

## (undated) DEVICE — YANKAUER,POOLE TIP,STERILE,50/CS: Brand: MEDLINE

## (undated) DEVICE — MEDI-VAC NON-CONDUCTIVE SUCTION TUBING: Brand: CARDINAL HEALTH

## (undated) DEVICE — HUNTER GASPER TIP, DISPOSABLE: Brand: RENEW

## (undated) DEVICE — TUBING IRR 16FT CNT WV 3 ASCP

## (undated) DEVICE — PACK PBDS LAPAROTOMY GENERAL

## (undated) DEVICE — CURVED JAW CORDLESS ULTRASONIC DISSECTOR: Brand: SONICISION 7

## (undated) NOTE — LETTER
05/27/24    Merissa Oshea      To Whom It May Concern:    This letter has been written at the patient's request. The above patient was seen at [facility] for treatment of a medical condition from ***    The patient may return to work/school on *** with the following limitations ***.      Sincerely,        Otto HERNANDEZ RN  05/27/24, 6:53 PM

## (undated) NOTE — LETTER
[address]  [address]  Authorization for Surgical Operation and Procedure     Date:___________                                                                                                         Time:__________  I hereby authorize Surgeon(s):  Jovanni Monsivais MD, my physician and his/her assistants (if applicable), which may include medical students, residents, and/or fellows, to perform the following surgical operation/ procedure and administer such anesthesia as may be determined necessary by my physician:  Operation/Procedure name (s) Procedure(s):  EXPLORATORY LAPAROTOMY, SMALL BOWEL RESECTION on Merissa Oshea   2.   I recognize that during the surgical operation/procedure, unforeseen conditions may necessitate additional or different procedures than those listed above.  I, therefore, further authorize and request that the above-named surgeon, assistants, or designees perform such procedures as are, in their judgment, necessary and desirable.    3.   My surgeon/physician has discussed prior to my surgery the potential benefits, risks and side effects of this procedure; the likelihood of achieving goals; and potential problems that might occur during recuperation.  They also discussed reasonable alternatives to the procedure, including risks, benefits, and side effects related to the alternatives and risks related to not receiving this procedure.  I have had all my questions answered and I acknowledge that no guarantee has been made as to the result that may be obtained.    4.   Should the need arise during my operation/procedure, which includes change of level of care prior to discharge, I also consent to the administration of blood and/or blood products.  Further, I understand that despite careful testing and screening of blood or blood products by collecting agencies, I may still be subject to ill effects as a result of receiving a blood transfusion and/or  blood products.  The following are some, but not all, of the potential risks that can occur: fever and allergic reactions, hemolytic reactions, transmission of diseases such as Hepatitis, AIDS and Cytomegalovirus (CMV) and fluid overload.  In the event that I wish to have an autologous transfusion of my own blood, or a directed donor transfusion, I will discuss this with my physician.  Check only if Refusing Blood or Blood Products  I understand refusal of blood or blood products as deemed necessary by my physician may have serious consequences to my condition to include possible death. I hereby assume responsibility for my refusal and release the hospital, its personnel, and my physicians from any responsibility for the consequences of my refusal.          o  Refuse      5.   I authorize the use of any specimen, organs, tissues, body parts or foreign objects that may be removed from my body during the operation/procedure for diagnosis, research or teaching purposes and their subsequent disposal by hospital authorities.  I also authorize the release of specimen test results and/or written reports to my treating physician on the hospital medical staff or other referring or consulting physicians involved in my care, at the discretion of the Pathologist or my treating physician.    6.   I consent to the photographing or videotaping of the operations or procedures to be performed, including appropriate portions of my body for medical, scientific, or educational purposes, provided my identity is not revealed by the pictures or by descriptive texts accompanying them.  If the procedure has been photographed/videotaped, the surgeon will obtain the original picture, image, videotape or CD.  The hospital will not be responsible for storage, release or maintenance of the picture, image, tape or CD.    7.   I consent to the presence of a  or observers in the operating room as deemed necessary by my physician  or their designees.    8.   I recognize that in the event my procedure results in extended X-Ray/fluoroscopy time, I may develop a skin reaction.    9. If I have a Do Not Attempt Resuscitation (DNAR) order in place, that status will be suspended while in the operating room, procedural suite, and during the recovery period unless otherwise explicitly stated by me (or a person authorized to consent on my behalf). The surgeon or my attending physician will determine when the applicable recovery period ends for purposes of reinstating the DNAR order.  10. Patients having a sterilization procedure: I understand that if the procedure is successful the results will be permanent and it will therefore be impossible for me to inseminate, conceive, or bear children.  I also understand that the procedure is intended to result in sterility, although the result has not been guaranteed.   11. I acknowledge that my physician has explained sedation/analgesia administration to me including the risk and benefits I consent to the administration of sedation/analgesia as may be necessary or desirable in the judgment of my physician.    I CERTIFY THAT I HAVE READ AND FULLY UNDERSTAND THE ABOVE CONSENT TO OPERATION and/or OTHER PROCEDURE.    _________________________________________  __________________________________  Signature of Patient     Signature of Responsible Person         ___________________________________         Printed Name of Responsible Person           _________________________________                 Relationship to Patient  _________________________________________  ______________________________  Signature of Witness          Date  Time      Patient Name: Merissa RAI Dayo     : 1950                 Printed: May 27, 2024     Medical Record #: [id number]                     Page [address] of [address] [place] [address]  [id number]    Consent for Anesthesia    I,  Merissa Oshea agree to be cared for by an anesthesiologist, who is specially trained to monitor me and give me medicine to put me to sleep or keep me comfortable during my procedure    I understand that my anesthesiologist is not an employee or agent of [facility] or [employer]. He or she works for [email or web address].    As the patient asking for anesthesia services, I agree to:  Allow the anesthesiologist (anesthesia doctor) to give me medicine and do additional procedures as necessary. Some examples are: Starting or using an “IV” to give me medicine, fluids or blood during my procedure, and having a breathing tube placed to help me breathe when I’m asleep (intubation). In the event that my heart stops working properly, I understand that my anesthesiologist will make every effort to sustain my life, unless otherwise directed by [facility] Do Not Resuscitate documents.  Tell my anesthesia doctor before my procedure:  If I am pregnant.  The last time that I ate or drank.  All of the medicines I take (including prescriptions, herbal supplements, and pills I can buy without a prescription (including street drugs/illegal medications). Failure to inform my anesthesiologist about these medicines may increase my risk of anesthetic complications.  If I am allergic to anything or have had a reaction to anesthesia before.  I understand how the anesthesia medicine will help me (benefits).  I understand that with any type of anesthesia medicine there are risks:  The most common risks are: nausea, vomiting, sore throat, muscle soreness, damage to my eyes, mouth, or teeth (from breathing tube placement).  Rare risks include: remembering what happened during my procedure, allergic reactions to medications, injury to my airway, heart, lungs, vision, nerves, or muscles and in extremely rare instances death.  My doctor has explained to me other choices available to me for my care  (alternatives).  Pregnant Patients (“epidural”):  I understand that the risks of having an epidural (medicine given into my back to help control pain during labor), include itching, low blood pressure, difficulty urinating, headache or slowing of the baby’s heart. Very rare risks include infection, bleeding, seizure, irregular heart rhythms and nerve injury.  Regional Anesthesia (“spinal”, “epidural”, & “nerve blocks”):  I understand that rare but potential complications include headache, bleeding, infection, seizure, irregular heart rhythms, and nerve injury.    I can change my mind about having anesthesia services at any time before I get the medicine.    _____________________________________________________________________________  Patient (or Representative) Signature/Relationship to Patient  Date   Time    _____________________________________________________________________________   Name (if used)    Language/Organization   Time    _____________________________________________________________________________  Anesthesiologist Signature     Date   Time  I have discussed the procedure and information above with the patient (or patient’s representative) and answered their questions. The patient or their representative has agreed to have anesthesia services.    _____________________________________________________________________________  Witness        Date   Time  I have verified that the signature is that of the patient or patient’s representative, and that it was signed before the procedure  Patient Name: Merissa Oshea     : 1950                 Printed: May 27, 2024     Medical Record #: [id number]                     Page 2 of 2

## (undated) NOTE — LETTER
[address]  [address]  Authorization for Surgical Operation and Procedure     Date:___________                                                                                                         Time:__________  I hereby authorize Surgeon(s):  Jovanni Monsivais MD, my physician and his/her assistants (if applicable), which may include medical students, residents, and/or fellows, to perform the following surgical operation/ procedure and administer such anesthesia as may be determined necessary by my physician:  Operation/Procedure name (s) Procedure(s):  EXPLORATORY LAPAROTOMY POSSIBLE BOWEL RESECTION on Merissa Oshea   2.   I recognize that during the surgical operation/procedure, unforeseen conditions may necessitate additional or different procedures than those listed above.  I, therefore, further authorize and request that the above-named surgeon, assistants, or designees perform such procedures as are, in their judgment, necessary and desirable.    3.   My surgeon/physician has discussed prior to my surgery the potential benefits, risks and side effects of this procedure; the likelihood of achieving goals; and potential problems that might occur during recuperation.  They also discussed reasonable alternatives to the procedure, including risks, benefits, and side effects related to the alternatives and risks related to not receiving this procedure.  I have had all my questions answered and I acknowledge that no guarantee has been made as to the result that may be obtained.    4.   Should the need arise during my operation/procedure, which includes change of level of care prior to discharge, I also consent to the administration of blood and/or blood products.  Further, I understand that despite careful testing and screening of blood or blood products by collecting agencies, I may still be subject to ill effects as a result of receiving a blood transfusion and/or  blood products.  The following are some, but not all, of the potential risks that can occur: fever and allergic reactions, hemolytic reactions, transmission of diseases such as Hepatitis, AIDS and Cytomegalovirus (CMV) and fluid overload.  In the event that I wish to have an autologous transfusion of my own blood, or a directed donor transfusion, I will discuss this with my physician.  Check only if Refusing Blood or Blood Products  I understand refusal of blood or blood products as deemed necessary by my physician may have serious consequences to my condition to include possible death. I hereby assume responsibility for my refusal and release the hospital, its personnel, and my physicians from any responsibility for the consequences of my refusal.          o  Refuse      5.   I authorize the use of any specimen, organs, tissues, body parts or foreign objects that may be removed from my body during the operation/procedure for diagnosis, research or teaching purposes and their subsequent disposal by hospital authorities.  I also authorize the release of specimen test results and/or written reports to my treating physician on the hospital medical staff or other referring or consulting physicians involved in my care, at the discretion of the Pathologist or my treating physician.    6.   I consent to the photographing or videotaping of the operations or procedures to be performed, including appropriate portions of my body for medical, scientific, or educational purposes, provided my identity is not revealed by the pictures or by descriptive texts accompanying them.  If the procedure has been photographed/videotaped, the surgeon will obtain the original picture, image, videotape or CD.  The hospital will not be responsible for storage, release or maintenance of the picture, image, tape or CD.    7.   I consent to the presence of a  or observers in the operating room as deemed necessary by my physician  or their designees.    8.   I recognize that in the event my procedure results in extended X-Ray/fluoroscopy time, I may develop a skin reaction.    9. If I have a Do Not Attempt Resuscitation (DNAR) order in place, that status will be suspended while in the operating room, procedural suite, and during the recovery period unless otherwise explicitly stated by me (or a person authorized to consent on my behalf). The surgeon or my attending physician will determine when the applicable recovery period ends for purposes of reinstating the DNAR order.  10. Patients having a sterilization procedure: I understand that if the procedure is successful the results will be permanent and it will therefore be impossible for me to inseminate, conceive, or bear children.  I also understand that the procedure is intended to result in sterility, although the result has not been guaranteed.   11. I acknowledge that my physician has explained sedation/analgesia administration to me including the risk and benefits I consent to the administration of sedation/analgesia as may be necessary or desirable in the judgment of my physician.    I CERTIFY THAT I HAVE READ AND FULLY UNDERSTAND THE ABOVE CONSENT TO OPERATION and/or OTHER PROCEDURE.    _________________________________________  __________________________________  Signature of Patient     Signature of Responsible Person         ___________________________________         Printed Name of Responsible Person           _________________________________                 Relationship to Patient  _________________________________________  ______________________________  Signature of Witness          Date  Time      Patient Name: Merissa RAI Dayo     : 1950                 Printed: May 23, 2024     Medical Record #: [id number]                     Page 1 of [address] [place] [address]  [id number]    Consent for Anesthesia    I,  Merissa Oshea agree to be cared for by an anesthesiologist, who is specially trained to monitor me and give me medicine to put me to sleep or keep me comfortable during my procedure    I understand that my anesthesiologist is not an employee or agent of [facility] or [employer]. He or she works for [employer].    As the patient asking for anesthesia services, I agree to:  Allow the anesthesiologist (anesthesia doctor) to give me medicine and do additional procedures as necessary. Some examples are: Starting or using an “IV” to give me medicine, fluids or blood during my procedure, and having a breathing tube placed to help me breathe when I’m asleep (intubation). In the event that my heart stops working properly, I understand that my anesthesiologist will make every effort to sustain my life, unless otherwise directed by [facility] Do Not Resuscitate documents.  Tell my anesthesia doctor before my procedure:  If I am pregnant.  The last time that I ate or drank.  All of the medicines I take (including prescriptions, herbal supplements, and pills I can buy without a prescription (including street drugs/illegal medications). Failure to inform my anesthesiologist about these medicines may increase my risk of anesthetic complications.  If I am allergic to anything or have had a reaction to anesthesia before.  I understand how the anesthesia medicine will help me (benefits).  I understand that with any type of anesthesia medicine there are risks:  The most common risks are: nausea, vomiting, sore throat, muscle soreness, damage to my eyes, mouth, or teeth (from breathing tube placement).  Rare risks include: remembering what happened during my procedure, allergic reactions to medications, injury to my airway, heart, lungs, vision, nerves, or muscles and in extremely rare instances death.  My doctor has explained to me other choices available to me for my care  (alternatives).  Pregnant Patients (“epidural”):  I understand that the risks of having an epidural (medicine given into my back to help control pain during labor), include itching, low blood pressure, difficulty urinating, headache or slowing of the baby’s heart. Very rare risks include infection, bleeding, seizure, irregular heart rhythms and nerve injury.  Regional Anesthesia (“spinal”, “epidural”, & “nerve blocks”):  I understand that rare but potential complications include headache, bleeding, infection, seizure, irregular heart rhythms, and nerve injury.    I can change my mind about having anesthesia services at any time before I get the medicine.    _____________________________________________________________________________  Patient (or Representative) Signature/Relationship to Patient  Date   Time    _____________________________________________________________________________   Name (if used)    Language/Organization   Time    _____________________________________________________________________________  Anesthesiologist Signature     Date   Time  I have discussed the procedure and information above with the patient (or patient’s representative) and answered their questions. The patient or their representative has agreed to have anesthesia services.    _____________________________________________________________________________  Witness        Date   Time  I have verified that the signature is that of the patient or patient’s representative, and that it was signed before the procedure  Patient Name: Merissa Oshea     : 1950                 Printed: May 23, 2024     Medical Record #: [id number]                     Page 2 of 2

## (undated) NOTE — LETTER
[facility]  Phone: [phone]  Right Fax: [phone]  CLARIFICATION FOR E-SSS    Sent By:   [id number] Date: May 9, 2024     Patient Name: Shaylee Oshea  Surgery Date: 2024  CSN: [id number]     Medical Record: [id number]  : 1950      Age: 73 year old        Sex: female    Surgeons and Role:     * Jovanni Monsivais MD - Primary  FAX: [phone]  Procedure Comments:  LAPAROSCOPIC APPENDECTOMY, POSSIBLE OPEN    YOU SHOULD RECEIVE 2 PAGES (INCLUDING COVER SHEET)    Please note that clarification is needed on the Electronic-Surgery Scheduling Sheet (E-SSS)  the original is included with this letter. Please have physician review and make changes on the faxed copy of the E-SSS.  Please review and submit change if needed    Other: Dr Monsivais needs to check the box to INITIATE ORDERS.     ALL CHANGES MUST BE DOCUMENTED ON THE E-SSS AND SIGNED BY THE PHYSICIAN    After physician has made the changes and signed the E-SSS please fax it to [phone] and these changes will then be made in Epic by the OR schedulers.     If you have any questions please call Pre-Admission Testing at [phone]    Thank you,    Pre-Admission Testing    [address]  [address]  Phone: [phone]  Fax: [phone]  [email or web address]    STATEMENT OF CONFIDENTIALITY: This transmittal is intended only for the use of the individual entity to which is addressed and may contain information that is privileged and confidential. information contained. If the reader of this message IS NOT the intended recipient, you are hereby notified that any disclosure, distribution or copying of this information is strictly prohibited. If you have received this transmission in error, please notify us immediately by telephone and return the original documents to us at the above address via the [place] Postal Service. Thank you.